# Patient Record
Sex: MALE | Race: BLACK OR AFRICAN AMERICAN | NOT HISPANIC OR LATINO | Employment: OTHER | URBAN - METROPOLITAN AREA
[De-identification: names, ages, dates, MRNs, and addresses within clinical notes are randomized per-mention and may not be internally consistent; named-entity substitution may affect disease eponyms.]

---

## 2022-01-24 ENCOUNTER — NURSE TRIAGE (OUTPATIENT)
Dept: ADMINISTRATIVE | Facility: CLINIC | Age: 82
End: 2022-01-24

## 2022-01-24 NOTE — TELEPHONE ENCOUNTER
Patients representative States patient is not with him but he is calling to ask if he should bring patient to Hospital because of coughing up green stuff and trouble with legs. Advised to bring patient in, No PCP on file.   Reason for Disposition   Information only question and nurse able to answer    Protocols used: INFORMATION ONLY CALL - NO TRIAGE-A-OH

## 2022-02-17 ENCOUNTER — OFFICE VISIT (OUTPATIENT)
Dept: FAMILY MEDICINE | Facility: CLINIC | Age: 82
End: 2022-02-17
Payer: MEDICARE

## 2022-02-17 ENCOUNTER — LAB VISIT (OUTPATIENT)
Dept: LAB | Facility: HOSPITAL | Age: 82
End: 2022-02-17
Attending: FAMILY MEDICINE
Payer: MEDICARE

## 2022-02-17 ENCOUNTER — TELEPHONE (OUTPATIENT)
Dept: FAMILY MEDICINE | Facility: CLINIC | Age: 82
End: 2022-02-17
Payer: MEDICARE

## 2022-02-17 VITALS
HEART RATE: 92 BPM | BODY MASS INDEX: 20.04 KG/M2 | HEIGHT: 70 IN | OXYGEN SATURATION: 85 % | WEIGHT: 140 LBS | TEMPERATURE: 98 F | SYSTOLIC BLOOD PRESSURE: 134 MMHG | DIASTOLIC BLOOD PRESSURE: 72 MMHG

## 2022-02-17 DIAGNOSIS — N18.31 STAGE 3A CHRONIC KIDNEY DISEASE: ICD-10-CM

## 2022-02-17 DIAGNOSIS — M17.0 PRIMARY OSTEOARTHRITIS OF BOTH KNEES: ICD-10-CM

## 2022-02-17 DIAGNOSIS — G89.4 CHRONIC PAIN SYNDROME: ICD-10-CM

## 2022-02-17 DIAGNOSIS — Z76.89 ENCOUNTER TO ESTABLISH CARE: ICD-10-CM

## 2022-02-17 DIAGNOSIS — Z76.89 ENCOUNTER TO ESTABLISH CARE: Primary | ICD-10-CM

## 2022-02-17 DIAGNOSIS — I10 HYPERTENSION, UNSPECIFIED TYPE: ICD-10-CM

## 2022-02-17 DIAGNOSIS — R05.9 COUGH: ICD-10-CM

## 2022-02-17 PROBLEM — I25.10 CAD (CORONARY ARTERY DISEASE): Status: ACTIVE | Noted: 2022-02-17

## 2022-02-17 PROBLEM — I25.10 CAD (CORONARY ARTERY DISEASE): Status: RESOLVED | Noted: 2022-02-17 | Resolved: 2022-02-17

## 2022-02-17 LAB
ALBUMIN SERPL BCP-MCNC: 3.4 G/DL (ref 3.5–5.2)
ALP SERPL-CCNC: 54 U/L (ref 55–135)
ALT SERPL W/O P-5'-P-CCNC: 21 U/L (ref 10–44)
ANION GAP SERPL CALC-SCNC: 8 MMOL/L (ref 8–16)
AST SERPL-CCNC: 29 U/L (ref 10–40)
BASOPHILS # BLD AUTO: 0.01 K/UL (ref 0–0.2)
BASOPHILS NFR BLD: 0.2 % (ref 0–1.9)
BILIRUB SERPL-MCNC: 0.3 MG/DL (ref 0.1–1)
BUN SERPL-MCNC: 49 MG/DL (ref 8–23)
CALCIUM SERPL-MCNC: 8.8 MG/DL (ref 8.7–10.5)
CHLORIDE SERPL-SCNC: 108 MMOL/L (ref 95–110)
CO2 SERPL-SCNC: 21 MMOL/L (ref 23–29)
CREAT SERPL-MCNC: 1.8 MG/DL (ref 0.5–1.4)
DIFFERENTIAL METHOD: ABNORMAL
EOSINOPHIL # BLD AUTO: 0 K/UL (ref 0–0.5)
EOSINOPHIL NFR BLD: 0.2 % (ref 0–8)
ERYTHROCYTE [DISTWIDTH] IN BLOOD BY AUTOMATED COUNT: 13.9 % (ref 11.5–14.5)
EST. GFR  (AFRICAN AMERICAN): 40 ML/MIN/1.73 M^2
EST. GFR  (NON AFRICAN AMERICAN): 35 ML/MIN/1.73 M^2
GLUCOSE SERPL-MCNC: 72 MG/DL (ref 70–110)
HCT VFR BLD AUTO: 32.1 % (ref 40–54)
HGB BLD-MCNC: 10.5 G/DL (ref 14–18)
IMM GRANULOCYTES # BLD AUTO: 0.01 K/UL (ref 0–0.04)
IMM GRANULOCYTES NFR BLD AUTO: 0.2 % (ref 0–0.5)
LYMPHOCYTES # BLD AUTO: 1.5 K/UL (ref 1–4.8)
LYMPHOCYTES NFR BLD: 33.8 % (ref 18–48)
MCH RBC QN AUTO: 29.8 PG (ref 27–31)
MCHC RBC AUTO-ENTMCNC: 32.7 G/DL (ref 32–36)
MCV RBC AUTO: 91 FL (ref 82–98)
MONOCYTES # BLD AUTO: 0.7 K/UL (ref 0.3–1)
MONOCYTES NFR BLD: 16.8 % (ref 4–15)
NEUTROPHILS # BLD AUTO: 2.1 K/UL (ref 1.8–7.7)
NEUTROPHILS NFR BLD: 48.8 % (ref 38–73)
NRBC BLD-RTO: 0 /100 WBC
PLATELET # BLD AUTO: 162 K/UL (ref 150–450)
PMV BLD AUTO: 11.8 FL (ref 9.2–12.9)
POTASSIUM SERPL-SCNC: 4.9 MMOL/L (ref 3.5–5.1)
PROT SERPL-MCNC: 7.2 G/DL (ref 6–8.4)
RBC # BLD AUTO: 3.52 M/UL (ref 4.6–6.2)
SODIUM SERPL-SCNC: 137 MMOL/L (ref 136–145)
WBC # BLD AUTO: 4.35 K/UL (ref 3.9–12.7)

## 2022-02-17 PROCEDURE — 99999 PR PBB SHADOW E&M-EST. PATIENT-LVL IV: ICD-10-PCS | Mod: PBBFAC,,, | Performed by: FAMILY MEDICINE

## 2022-02-17 PROCEDURE — 36415 COLL VENOUS BLD VENIPUNCTURE: CPT | Performed by: FAMILY MEDICINE

## 2022-02-17 PROCEDURE — 99999 PR PBB SHADOW E&M-EST. PATIENT-LVL IV: CPT | Mod: PBBFAC,,, | Performed by: FAMILY MEDICINE

## 2022-02-17 PROCEDURE — 85025 COMPLETE CBC W/AUTO DIFF WBC: CPT | Performed by: FAMILY MEDICINE

## 2022-02-17 PROCEDURE — 99204 PR OFFICE/OUTPT VISIT, NEW, LEVL IV, 45-59 MIN: ICD-10-PCS | Mod: S$PBB,,, | Performed by: FAMILY MEDICINE

## 2022-02-17 PROCEDURE — 99214 OFFICE O/P EST MOD 30 MIN: CPT | Mod: PBBFAC,PO | Performed by: FAMILY MEDICINE

## 2022-02-17 PROCEDURE — 80053 COMPREHEN METABOLIC PANEL: CPT | Performed by: FAMILY MEDICINE

## 2022-02-17 PROCEDURE — 99204 OFFICE O/P NEW MOD 45 MIN: CPT | Mod: S$PBB,,, | Performed by: FAMILY MEDICINE

## 2022-02-17 RX ORDER — POTASSIUM CHLORIDE 750 MG/1
10 TABLET, EXTENDED RELEASE ORAL
COMMUNITY
Start: 2021-10-28 | End: 2022-10-28

## 2022-02-17 RX ORDER — CARVEDILOL 25 MG/1
25 TABLET ORAL 2 TIMES DAILY
COMMUNITY
Start: 2022-01-22

## 2022-02-17 RX ORDER — FERROUS GLUCONATE 324(38)MG
1 TABLET ORAL EVERY MORNING
COMMUNITY
Start: 2021-11-24

## 2022-02-17 RX ORDER — LANOLIN ALCOHOL/MO/W.PET/CERES
1 CREAM (GRAM) TOPICAL DAILY
COMMUNITY
Start: 2021-12-23

## 2022-02-17 RX ORDER — RANOLAZINE 1000 MG/1
1000 TABLET, EXTENDED RELEASE ORAL 2 TIMES DAILY
COMMUNITY
Start: 2022-01-22

## 2022-02-17 RX ORDER — MINERAL OIL
180 ENEMA (ML) RECTAL DAILY
Qty: 30 TABLET | Refills: 0 | Status: SHIPPED | OUTPATIENT
Start: 2022-02-17 | End: 2023-02-17

## 2022-02-17 NOTE — PROGRESS NOTES
(Portions of this note were dictated using voice recognition software and may contain dictation related errors in spelling/grammar/syntax not found on text review)    CC:   Chief Complaint   Patient presents with    Follow-up       HPI: 81 y.o. male presented as a new pt to establish care accompanied with son. He has medical history of osteoarthritis of both knees, has chronic pain ronald to it, he ambulates with walker but does not walk much due to weakness. He recently moved from [place] to live with her daughter, son check on him as well. He brought medications bottles with him, currently he is taking coreg 25 bid for HTN, also takes renexa, son and pt are not sure of cardiac history, states he used to see a cardiologist many years ago. He used to follow up with nephrology also, wants referral for nephrology, pain management. He reports cough which is dry for the past week, denies fever, chills,cSOB, chest pain, N/V,abdominal pain, changes in bowel habits, urine problems. Other medications he is taking are ferrous sulphate, magnesium oxide and potassium chloride, does not need refills. He has no other concerns.     No past medical history on file.    No past surgical history on file.    No family history on file.         Lab Results   Component Value Date    CHOL 98 07/07/2021    TRIG 37 07/07/2021    HDL 48 07/07/2021    HGBA1C 5.5 11/12/2020    LDLCALC 43 07/07/2021           Vital signs reviewed  PE:   APPEARANCE: Well nourished, well developed, in no acute distress.    HEAD: Normocephalic, atraumatic.  EYES: EOMI.  Conjunctivae noninjected.  NOSE: Mucosa pink. Airway clear.  MOUTH & THROAT: No tonsillar enlargement. No pharyngeal erythema or exudate.   NECK: Supple with no cervical lymphadenopathy.    CHEST: Good inspiratory effort. Lungs clear to auscultation with no wheezes or crackles.  CARDIOVASCULAR: Normal S1, S2. No rubs, murmurs, or gallops.  ABDOMEN: Bowel sounds normal. Not distended. Soft. No  tenderness or masses. No organomegaly.  EXTREMITIES: No edema, cyanosis, or clubbing.    Review of Systems   Constitutional: Negative for chills, fatigue and fever.   HENT: Negative.    Respiratory: Negative for cough, shortness of breath and wheezing.    Cardiovascular: Negative for chest pain, palpitations and leg swelling.   Gastrointestinal: Negative for change in bowel habit, constipation, diarrhea, nausea, vomiting and change in bowel habit.   Genitourinary: Negative.    Musculoskeletal: Negative.    Neurological: Negative.    Psychiatric/Behavioral: Negative.    All other systems reviewed and are negative.      IMPRESSION  1. Encounter to establish care    2. Primary osteoarthritis of both knees    3. Cough    4. Stage 3a chronic kidney disease    5. Chronic pain syndrome          PLAN      1. Primary osteoarthritis of both knees  - Ambulatory referral/consult to Pain Clinic; Future  Tylenol arthritis as needed  Avoid NSAID'S due to low kidney function      2. Cough  Advised to use mucinex      3. Stage 3a chronic kidney disease  - Ambulatory referral/consult to Nephrology; Future  - CBC Auto Differential; Future  - Comprehensive Metabolic Panel; Future      4. Chronic pain syndrome  - Ambulatory referral/consult to Pain Clinic; Future      5. Encounter to establish care  - CBC Auto Differential; Future  - Comprehensive Metabolic Panel; Future       6. Hypertension  Stable  Continue coreg 25 mg  Low salt diet      SCREENINGS      Immunizations:   denies flu and shingles   Had Covid and tdap      Age/demographic appropriate health maintenance:  Done      Phuong Umaña   2/17/2022

## 2022-02-22 DIAGNOSIS — N18.31 STAGE 3A CHRONIC KIDNEY DISEASE: Primary | ICD-10-CM

## 2022-02-28 ENCOUNTER — TELEPHONE (OUTPATIENT)
Dept: FAMILY MEDICINE | Facility: CLINIC | Age: 82
End: 2022-02-28
Payer: MEDICARE

## 2022-03-04 ENCOUNTER — TELEPHONE (OUTPATIENT)
Dept: FAMILY MEDICINE | Facility: CLINIC | Age: 82
End: 2022-03-04
Payer: MEDICARE

## 2022-03-28 ENCOUNTER — LAB VISIT (OUTPATIENT)
Dept: LAB | Facility: HOSPITAL | Age: 82
End: 2022-03-28
Attending: FAMILY MEDICINE
Payer: MEDICARE

## 2022-03-28 DIAGNOSIS — N18.31 STAGE 3A CHRONIC KIDNEY DISEASE: ICD-10-CM

## 2022-03-28 LAB
ANION GAP SERPL CALC-SCNC: 10 MMOL/L (ref 8–16)
BASOPHILS # BLD AUTO: 0.02 K/UL (ref 0–0.2)
BASOPHILS NFR BLD: 0.4 % (ref 0–1.9)
CALCIUM SERPL-MCNC: 8.9 MG/DL (ref 8.7–10.5)
CHLORIDE SERPL-SCNC: 105 MMOL/L (ref 95–110)
CO2 SERPL-SCNC: 24 MMOL/L (ref 23–29)
CREAT SERPL-MCNC: 1.49 MG/DL (ref 0.5–1.4)
DIFFERENTIAL METHOD: ABNORMAL
EOSINOPHIL # BLD AUTO: 0.2 K/UL (ref 0–0.5)
EOSINOPHIL NFR BLD: 4.4 % (ref 0–8)
ERYTHROCYTE [DISTWIDTH] IN BLOOD BY AUTOMATED COUNT: 15 % (ref 11.5–14.5)
EST. GFR  (AFRICAN AMERICAN): 50.9 ML/MIN/1.73 M^2
EST. GFR  (NON AFRICAN AMERICAN): 44 ML/MIN/1.73 M^2
GLUCOSE SERPL-MCNC: 104 MG/DL (ref 70–110)
HCT VFR BLD AUTO: 30.7 % (ref 40–54)
HGB BLD-MCNC: 9.8 G/DL (ref 14–18)
IMM GRANULOCYTES # BLD AUTO: 0.01 K/UL (ref 0–0.04)
IMM GRANULOCYTES NFR BLD AUTO: 0.2 % (ref 0–0.5)
LYMPHOCYTES # BLD AUTO: 1.7 K/UL (ref 1–4.8)
LYMPHOCYTES NFR BLD: 31 % (ref 18–48)
MCH RBC QN AUTO: 28.9 PG (ref 27–31)
MCHC RBC AUTO-ENTMCNC: 31.9 G/DL (ref 32–36)
MCV RBC AUTO: 91 FL (ref 82–98)
MONOCYTES # BLD AUTO: 0.6 K/UL (ref 0.3–1)
MONOCYTES NFR BLD: 10.7 % (ref 4–15)
NEUTROPHILS # BLD AUTO: 2.9 K/UL (ref 1.8–7.7)
NEUTROPHILS NFR BLD: 53.3 % (ref 38–73)
NRBC BLD-RTO: 0 /100 WBC
PLATELET # BLD AUTO: 214 K/UL (ref 150–450)
PMV BLD AUTO: 12.2 FL (ref 9.2–12.9)
POTASSIUM SERPL-SCNC: 4.7 MMOL/L (ref 3.5–5.1)
RBC # BLD AUTO: 3.39 M/UL (ref 4.6–6.2)
SODIUM SERPL-SCNC: 139 MMOL/L (ref 136–145)
UUN UR-MCNC: 16 MG/DL (ref 2–20)
WBC # BLD AUTO: 5.51 K/UL (ref 3.9–12.7)

## 2022-03-28 PROCEDURE — 85025 COMPLETE CBC W/AUTO DIFF WBC: CPT | Mod: PO | Performed by: INTERNAL MEDICINE

## 2022-03-28 PROCEDURE — 36415 COLL VENOUS BLD VENIPUNCTURE: CPT | Mod: PO | Performed by: INTERNAL MEDICINE

## 2022-03-28 PROCEDURE — 80048 BASIC METABOLIC PNL TOTAL CA: CPT | Mod: PO | Performed by: INTERNAL MEDICINE

## 2022-08-04 ENCOUNTER — LAB VISIT (OUTPATIENT)
Dept: LAB | Facility: HOSPITAL | Age: 82
End: 2022-08-04
Attending: INTERNAL MEDICINE
Payer: MEDICARE

## 2022-08-04 DIAGNOSIS — I25.10 ASHD (ARTERIOSCLEROTIC HEART DISEASE): Primary | ICD-10-CM

## 2022-08-04 LAB
CHOLEST SERPL-MCNC: 201 MG/DL (ref 120–199)
CHOLEST/HDLC SERPL: 4.9 {RATIO} (ref 2–5)
HDLC SERPL-MCNC: 41 MG/DL (ref 40–75)
HDLC SERPL: 20.4 % (ref 20–50)
LDLC SERPL CALC-MCNC: 139.2 MG/DL (ref 63–159)
NONHDLC SERPL-MCNC: 160 MG/DL
TRIGL SERPL-MCNC: 104 MG/DL (ref 30–150)

## 2022-08-04 PROCEDURE — 36415 COLL VENOUS BLD VENIPUNCTURE: CPT | Mod: PO | Performed by: INTERNAL MEDICINE

## 2022-08-04 PROCEDURE — 80061 LIPID PANEL: CPT | Performed by: INTERNAL MEDICINE

## 2022-08-10 ENCOUNTER — HOSPITAL ENCOUNTER (EMERGENCY)
Facility: HOSPITAL | Age: 82
Discharge: HOME | End: 2022-08-10
Attending: EMERGENCY MEDICINE
Payer: MEDICARE

## 2022-08-10 VITALS
TEMPERATURE: 99 F | HEIGHT: 70 IN | RESPIRATION RATE: 18 BRPM | BODY MASS INDEX: 18.61 KG/M2 | HEART RATE: 69 BPM | OXYGEN SATURATION: 100 % | SYSTOLIC BLOOD PRESSURE: 137 MMHG | DIASTOLIC BLOOD PRESSURE: 63 MMHG | WEIGHT: 130 LBS

## 2022-08-10 DIAGNOSIS — M54.12 CERVICAL RADICULOPATHY: ICD-10-CM

## 2022-08-10 PROCEDURE — 63600175 PHARM REV CODE 636 W HCPCS: Mod: ER | Performed by: EMERGENCY MEDICINE

## 2022-08-10 PROCEDURE — 99284 EMERGENCY DEPT VISIT MOD MDM: CPT | Mod: ER

## 2022-08-10 PROCEDURE — 25000003 PHARM REV CODE 250: Mod: ER | Performed by: EMERGENCY MEDICINE

## 2022-08-10 RX ORDER — PREDNISONE 20 MG/1
40 TABLET ORAL
Status: COMPLETED | OUTPATIENT
Start: 2022-08-10 | End: 2022-08-10

## 2022-08-10 RX ORDER — TRAMADOL HYDROCHLORIDE 50 MG/1
50 TABLET ORAL
Status: COMPLETED | OUTPATIENT
Start: 2022-08-10 | End: 2022-08-10

## 2022-08-10 RX ORDER — TRAMADOL HYDROCHLORIDE 50 MG/1
50 TABLET ORAL EVERY 8 HOURS PRN
Qty: 9 TABLET | Refills: 0 | Status: SHIPPED | OUTPATIENT
Start: 2022-08-10 | End: 2022-08-10 | Stop reason: SDUPTHER

## 2022-08-10 RX ORDER — PREDNISONE 20 MG/1
40 TABLET ORAL DAILY
Qty: 10 TABLET | Refills: 0 | Status: SHIPPED | OUTPATIENT
Start: 2022-08-10 | End: 2022-08-15

## 2022-08-10 RX ORDER — TRAMADOL HYDROCHLORIDE 50 MG/1
50 TABLET ORAL EVERY 8 HOURS PRN
Qty: 9 TABLET | Refills: 0 | Status: SHIPPED | OUTPATIENT
Start: 2022-08-10 | End: 2022-08-13

## 2022-08-10 RX ORDER — PREDNISONE 20 MG/1
40 TABLET ORAL ONCE
Status: DISCONTINUED | OUTPATIENT
Start: 2022-08-10 | End: 2022-08-10

## 2022-08-10 RX ADMIN — PREDNISONE 40 MG: 20 TABLET ORAL at 07:08

## 2022-08-10 RX ADMIN — TRAMADOL HYDROCHLORIDE 50 MG: 50 TABLET, COATED ORAL at 07:08

## 2022-08-11 NOTE — ED PROVIDER NOTES
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      Patient currently presents with concern regarding neck pain.  Onset noted over the past several weeks with recent worsening in the last few days.  Patient describes radiation of the pain to the bilateral upper extremities.  There is no numbness, weakness, incontinence reported.  Patient has not attempted treatment at home with over-the-counter medications.  Urinary complaints are denied.          Review of patient's allergies indicates:   Allergen Reactions    Nsaids (non-steroidal anti-inflammatory drug)      Past Medical History:   Diagnosis Date    Arthritis     Hypertension     Renal disorder     Stroke      No past surgical history on file.  No family history on file.  Social History     Tobacco Use    Smoking status: Former Smoker   Substance Use Topics    Alcohol use: Never     Review of Systems   Constitutional: Negative for chills and fever.   HENT: Negative for congestion and sore throat.    Respiratory: Negative for chest tightness and shortness of breath.    Cardiovascular: Negative for chest pain and palpitations.   Gastrointestinal: Negative for abdominal pain and vomiting.   Genitourinary: Negative for difficulty urinating and dysuria.   Musculoskeletal: Positive for neck pain.   Skin: Negative for color change and rash.   Allergic/Immunologic: Negative for immunocompromised state.   Neurological: Negative for weakness and numbness.   Hematological: Negative for adenopathy.   All other systems reviewed and are negative.    Physical Exam     Initial Vitals [08/10/22 1810]   BP Pulse Resp Temp SpO2   137/63 69 19 98.9 °F (37.2 °C) 100 %      MAP       --         Physical Exam    Constitutional: He appears well-developed and well-nourished. He is not diaphoretic. No distress.   HENT:    Head: Normocephalic and atraumatic.   Neck: Neck supple. No crepitus. No JVD present.   Cardiovascular: Normal rate, regular rhythm, normal heart sounds and intact distal pulses.   Pulmonary/Chest: Breath sounds normal. No respiratory distress.   Musculoskeletal:      Cervical back: Neck supple. Tenderness present. No deformity, bony tenderness or crepitus. Normal range of motion.        Back:      Neurological: He is alert and oriented to person, place, and time. He has normal strength. No sensory deficit.   Skin: Skin is warm and dry.       ED Course   Procedures  Labs Reviewed - No data to display       Imaging Results          X-Ray Cervical Spine AP And Lateral (Final result)  Result time 08/10/22 18:56:41    Final result by Fabio Ramirez MD (08/10/22 18:56:41)                 Impression:      No definite fracture noting that advanced degenerative changes are present throughout the cervical spine and somewhat degrade evaluation for acute abnormality.  Clinical correlation and further evaluation as warranted.      Electronically signed by: Fabio Ramirez  Date:    08/10/2022  Time:    18:56             Narrative:    EXAMINATION:  XR CERVICAL SPINE AP LATERAL    CLINICAL HISTORY:  Radiculopathy, cervical region    TECHNIQUE:  AP, lateral and open mouth views of the cervical spine were performed.    COMPARISON:  None.    FINDINGS:  No definite fracture.  No traumatic malalignment.  No osseous destructive process.    Advanced degenerative change throughout the cervical spine with near complete disc space height loss and facet and uncovertebral joint arthropathy.  No prevertebral soft tissue swelling.  Clinical correlation is advised.                                 Medications   traMADoL tablet 50 mg (50 mg Oral Given 8/10/22 1930)   predniSONE tablet 40 mg (40 mg Oral Given 8/10/22 1931)     Medical Decision Making:   ED Management:  All findings were reviewed with the patient/family in detail.   Symptoms  appear to be related to cervical radiculopathy as result of degenerative disease as appreciated in the imaging studies.  Patient has no neuro deficits at present.  I see no indication of an emergent process beyond that addressed during our encounter but have duly counseled the patient/family regarding the need for prompt follow-up as well as the indications that should prompt immediate return to the emergency room should new or worrisome developments occur.  The patient has additionally been provided with printed information regarding diagnosis as well as instructions regarding follow up and any medications intended to manage the patient's aforementioned conditions.  The patient/family communicates understanding of all this information and all remaining questions and concerns were addressed at this time.                            Clinical Impression:   Final diagnoses:  [M54.12] Cervical radiculopathy          ED Disposition Condition    Discharge Stable        ED Prescriptions     Medication Sig Dispense Start Date End Date Auth. Provider    traMADoL (ULTRAM) 50 mg tablet  (Status: Discontinued) Take 1 tablet (50 mg total) by mouth every 8 (eight) hours as needed for Pain. 9 tablet 8/10/2022 8/10/2022 Donato Majano MD    predniSONE (DELTASONE) 20 MG tablet Take 2 tablets (40 mg total) by mouth once daily. for 5 days 10 tablet 8/10/2022 8/15/2022 Donato Majano MD    traMADoL (ULTRAM) 50 mg tablet Take 1 tablet (50 mg total) by mouth every 8 (eight) hours as needed for Pain. 9 tablet 8/10/2022 8/13/2022 Donato Majano MD        Follow-up Information     Follow up With Specialties Details Why Contact Info    PCP  Schedule an appointment as soon as possible for a visit  for reassessment     [facility] Emergency Dept Emergency Medicine Go to  As needed, If symptoms worsen [address] [facility] [address] [phone]           Donato Majano MD  08/11/22 6070

## 2022-08-11 NOTE — ED NOTES
Reviewed discharge instructions, Rx, and follow up with pt and family.  Pt and family verbalized understanding.  Pt discharged in stable condition

## 2022-08-11 NOTE — ED NOTES
"Pt states has had old CVA, states some tingling to Left side "sometimes" since CVA.  Pt denies any falls.  Pt with pain to bilateral shoulders, pain with palpation to both shoulders.  Family member states gave toradol yesterday for pain.  States pt has c/o pain since yesterday.   "

## 2022-09-21 ENCOUNTER — HOSPITAL ENCOUNTER (EMERGENCY)
Facility: HOSPITAL | Age: 82
Discharge: HOME | End: 2022-09-21
Attending: EMERGENCY MEDICINE
Payer: MEDICARE

## 2022-09-21 VITALS
DIASTOLIC BLOOD PRESSURE: 59 MMHG | TEMPERATURE: 98 F | HEIGHT: 70 IN | HEART RATE: 60 BPM | RESPIRATION RATE: 20 BRPM | SYSTOLIC BLOOD PRESSURE: 109 MMHG | OXYGEN SATURATION: 100 % | WEIGHT: 130 LBS | BODY MASS INDEX: 18.61 KG/M2

## 2022-09-21 DIAGNOSIS — Z87.898 HISTORY OF EPISTAXIS: Primary | ICD-10-CM

## 2022-09-21 DIAGNOSIS — R41.3 MEMORY LOSS: ICD-10-CM

## 2022-09-21 LAB
ALBUMIN SERPL BCP-MCNC: 3.6 G/DL (ref 3.5–5.2)
ALP SERPL-CCNC: 72 U/L (ref 38–126)
ALT SERPL W/O P-5'-P-CCNC: 19 U/L (ref 10–44)
ANION GAP SERPL CALC-SCNC: 8 MMOL/L (ref 8–16)
AST SERPL-CCNC: 22 U/L (ref 15–46)
BASOPHILS # BLD AUTO: 0.03 K/UL (ref 0–0.2)
BASOPHILS NFR BLD: 0.6 % (ref 0–1.9)
BILIRUB SERPL-MCNC: 0.3 MG/DL (ref 0.1–1)
BILIRUB UR QL STRIP: NEGATIVE
CALCIUM SERPL-MCNC: 8.6 MG/DL (ref 8.7–10.5)
CHLORIDE SERPL-SCNC: 107 MMOL/L (ref 95–110)
CLARITY UR REFRACT.AUTO: CLEAR
CO2 SERPL-SCNC: 24 MMOL/L (ref 23–29)
COLOR UR AUTO: YELLOW
CREAT SERPL-MCNC: 1.31 MG/DL (ref 0.5–1.4)
DIFFERENTIAL METHOD: ABNORMAL
EOSINOPHIL # BLD AUTO: 0.3 K/UL (ref 0–0.5)
EOSINOPHIL NFR BLD: 5.3 % (ref 0–8)
ERYTHROCYTE [DISTWIDTH] IN BLOOD BY AUTOMATED COUNT: 13.3 % (ref 11.5–14.5)
EST. GFR  (NO RACE VARIABLE): 55 ML/MIN/1.73 M^2
GLUCOSE SERPL-MCNC: 119 MG/DL (ref 70–110)
GLUCOSE UR QL STRIP: NEGATIVE
HCT VFR BLD AUTO: 25.6 % (ref 40–54)
HGB BLD-MCNC: 8.4 G/DL (ref 14–18)
HGB UR QL STRIP: NEGATIVE
IMM GRANULOCYTES # BLD AUTO: 0.01 K/UL (ref 0–0.04)
IMM GRANULOCYTES NFR BLD AUTO: 0.2 % (ref 0–0.5)
KETONES UR QL STRIP: NEGATIVE
LEUKOCYTE ESTERASE UR QL STRIP: NEGATIVE
LYMPHOCYTES # BLD AUTO: 1.2 K/UL (ref 1–4.8)
LYMPHOCYTES NFR BLD: 23.3 % (ref 18–48)
MCH RBC QN AUTO: 29.2 PG (ref 27–31)
MCHC RBC AUTO-ENTMCNC: 32.8 G/DL (ref 32–36)
MCV RBC AUTO: 89 FL (ref 82–98)
MONOCYTES # BLD AUTO: 0.4 K/UL (ref 0.3–1)
MONOCYTES NFR BLD: 8.4 % (ref 4–15)
NEUTROPHILS # BLD AUTO: 3.2 K/UL (ref 1.8–7.7)
NEUTROPHILS NFR BLD: 62.2 % (ref 38–73)
NITRITE UR QL STRIP: NEGATIVE
NRBC BLD-RTO: 0 /100 WBC
PH UR STRIP: 6 [PH] (ref 5–8)
PLATELET # BLD AUTO: 164 K/UL (ref 150–450)
PMV BLD AUTO: 12 FL (ref 9.2–12.9)
POTASSIUM SERPL-SCNC: 5.2 MMOL/L (ref 3.5–5.1)
PROT SERPL-MCNC: 6.7 G/DL (ref 6–8.4)
PROT UR QL STRIP: NEGATIVE
RBC # BLD AUTO: 2.88 M/UL (ref 4.6–6.2)
SODIUM SERPL-SCNC: 139 MMOL/L (ref 136–145)
SP GR UR STRIP: <=1.005 (ref 1–1.03)
URN SPEC COLLECT METH UR: NORMAL
UROBILINOGEN UR STRIP-ACNC: NEGATIVE EU/DL
UUN UR-MCNC: 22 MG/DL (ref 2–20)
WBC # BLD AUTO: 5.1 K/UL (ref 3.9–12.7)

## 2022-09-21 PROCEDURE — 99284 EMERGENCY DEPT VISIT MOD MDM: CPT | Mod: 25,ER

## 2022-09-21 PROCEDURE — 81003 URINALYSIS AUTO W/O SCOPE: CPT | Mod: ER | Performed by: PHYSICIAN ASSISTANT

## 2022-09-21 PROCEDURE — 80053 COMPREHEN METABOLIC PANEL: CPT | Mod: ER | Performed by: PHYSICIAN ASSISTANT

## 2022-09-21 PROCEDURE — 25000003 PHARM REV CODE 250: Mod: ER | Performed by: PHYSICIAN ASSISTANT

## 2022-09-21 PROCEDURE — 96360 HYDRATION IV INFUSION INIT: CPT | Mod: ER

## 2022-09-21 PROCEDURE — 85025 COMPLETE CBC W/AUTO DIFF WBC: CPT | Mod: ER | Performed by: PHYSICIAN ASSISTANT

## 2022-09-21 RX ADMIN — SODIUM CHLORIDE 1000 ML: 0.9 INJECTION, SOLUTION INTRAVENOUS at 03:09

## 2022-09-21 NOTE — DISCHARGE INSTRUCTIONS
Follow-up with primary care physician for further management of the memory loss.  In the ER if symptoms persist or worsen

## 2022-09-21 NOTE — ED PROVIDER NOTES
Encounter Date: 9/21/2022       History     Chief Complaint   Patient presents with    Epistaxis     Pt c/o nose bleeding several times this week. Pt states being worried that someone placed something in his food. Daughter states pt is in early sides of Dementia.      82-year-old male with history of CVA, hypertension, arthritis, CKD presents the ER for evaluation of epistaxis.  Patient is here with daughter who states that she noticed that patient has had intermittent nasal bleeding.  He had 1 episode today, 1 last week and 1 the week before.  States that he has been excessively blowing his nose lately.  No URI symptoms, cough or congestion otherwise.  Does not use any blood thinners.    Daughter states that patient has had intermittent memory loss and confusion for several months.  Daughter thinks that patient may have early onset undiagnosed dementia.  Does not currently have a primary care physician as he recently moved from [place] into her home.  He has not had any head trauma or loss of consciousness.  States that patient has been acting to his baseline for the last several months.  Has not had any fevers or chills at home otherwise.  No vomiting or diarrhea.  Walks with a walker at baseline.    The history is provided by the patient.   Review of patient's allergies indicates:   Allergen Reactions    Nsaids (non-steroidal anti-inflammatory drug)      Past Medical History:   Diagnosis Date    Arthritis     Hypertension     Renal disorder     Stroke      No past surgical history on file.  No family history on file.  Social History     Tobacco Use    Smoking status: Former   Substance Use Topics    Alcohol use: Never     Review of Systems   Constitutional:  Negative for chills and fever.   HENT:  Positive for nosebleeds (not active). Negative for congestion.    Eyes:  Negative for visual disturbance.   Respiratory:  Negative for cough and shortness of breath.    Cardiovascular:  Negative for chest pain.    Gastrointestinal:  Negative for abdominal pain, nausea and vomiting.   Genitourinary:  Negative for dysuria and flank pain.   Musculoskeletal:  Negative for myalgias.   Skin:  Negative for rash.   Allergic/Immunologic: Negative for immunocompromised state.   Neurological:  Negative for weakness and numbness.   Hematological:  Does not bruise/bleed easily.   Psychiatric/Behavioral:  Positive for confusion (chronic).      Physical Exam     Initial Vitals [09/21/22 1434]   BP Pulse Resp Temp SpO2   136/62 71 18 98.1 °F (36.7 °C) 98 %      MAP       --         Physical Exam    Vitals reviewed.  Constitutional: He appears well-developed and well-nourished. He is not diaphoretic. No distress.   HENT:   Head: Normocephalic and atraumatic.   Nose: Nose normal. No nasal deformity or nasal septal hematoma. No epistaxis.   Mouth/Throat: Uvula is midline, oropharynx is clear and moist and mucous membranes are normal.   Eyes: Conjunctivae and EOM are normal.   Neck: Neck supple.   Cardiovascular:  Normal rate, regular rhythm, normal heart sounds and intact distal pulses.           Pulmonary/Chest: Breath sounds normal. No respiratory distress.   Musculoskeletal:         General: Normal range of motion.      Cervical back: Neck supple.     Neurological: He is alert. He has normal strength. He is disoriented (place, situation). GCS eye subscore is 4. GCS verbal subscore is 5. GCS motor subscore is 6.   Walks with gait   Skin: Skin is warm.       ED Course   Procedures  Labs Reviewed   CBC W/ AUTO DIFFERENTIAL - Abnormal; Notable for the following components:       Result Value    RBC 2.88 (*)     Hemoglobin 8.4 (*)     Hematocrit 25.6 (*)     All other components within normal limits   COMPREHENSIVE METABOLIC PANEL - Abnormal; Notable for the following components:    Potassium 5.2 (*)     Glucose 119 (*)     BUN 22 (*)     Calcium 8.6 (*)     eGFR 55.0 (*)     All other components within normal limits   URINALYSIS, REFLEX TO  URINE CULTURE    Narrative:     Preferred Collection Type->Urine, Clean Catch  Specimen Source->Urine  Collection Type->Urine, Clean Catch          Imaging Results    None          Medications   sodium chloride 0.9% bolus 1,000 mL (0 mLs Intravenous Stopped 9/21/22 4284)           APC / Resident Notes:   Patient seen in the ER promptly upon arrival.  He is afebrile, no acute distress.  Physical examination unremarkable.  Nasal exam is unremarkable.  No active epistaxis.  Oropharynx is patent.  No erythema or exudates.  Heart and lung sounds normal.  Abdomen soft, nondistended, nontender.  Patient is alert and oriented x2.  He is otherwise pleasant and no acute distress.    Laboratory studies normal white count of 5.1.  Hemoglobin is stable.  Chemistries unremarkable.  Similar to history of CKD.  Urinalysis does not show evidence of infection or blood.    Patient has memory loss and intermittent confusion ongoing for several months may be secondary to possible early dementia which will need to be worked up by primary care physician.  Daughter requested for a new primary doctor as well as Podiatry which both given to her.    No episodes of nosebleeds while in the emergency room.  Given nosebleed discharge instructions.  Patient is otherwise stable for discharge and close follow-up at this time.    Disclaimer: This note has been generated using voice-recognition software. There may be typographical errors that have been missed during proof-reading.                       Clinical Impression:   Final diagnoses:  [Z87.898] History of epistaxis (Primary)  [R41.3] Memory loss      ED Disposition Condition    Discharge Stable          ED Prescriptions    None       Follow-up Information       Follow up With Specialties Details Why Contact Info Additional Information    [facility] Internal Medicine Internal Medicine   [address] [place] [address] [phone] [facility]  Offices from [facility]. Take [facility] elevators to 2nd floor.    [place] [facility] Podiatry   [address], [address]  [facility] [address] [phone] [address]             Susan Hernandez PA-C  09/21/22 3684

## 2022-12-27 ENCOUNTER — LAB VISIT (OUTPATIENT)
Dept: LAB | Facility: HOSPITAL | Age: 82
End: 2022-12-27
Attending: INTERNAL MEDICINE
Payer: MEDICARE

## 2022-12-27 DIAGNOSIS — I25.10 CORONARY ARTERIOSCLEROSIS: Primary | ICD-10-CM

## 2022-12-27 LAB
ALBUMIN SERPL BCP-MCNC: 4.1 G/DL (ref 3.5–5.2)
ALP SERPL-CCNC: 77 U/L (ref 38–126)
ALT SERPL W/O P-5'-P-CCNC: 14 U/L (ref 10–44)
ANION GAP SERPL CALC-SCNC: 9 MMOL/L (ref 8–16)
AST SERPL-CCNC: 20 U/L (ref 15–46)
BILIRUB SERPL-MCNC: 0.5 MG/DL (ref 0.1–1)
CALCIUM SERPL-MCNC: 8.6 MG/DL (ref 8.7–10.5)
CHLORIDE SERPL-SCNC: 108 MMOL/L (ref 95–110)
CO2 SERPL-SCNC: 25 MMOL/L (ref 23–29)
CREAT SERPL-MCNC: 0.94 MG/DL (ref 0.5–1.4)
EST. GFR  (NO RACE VARIABLE): >60 ML/MIN/1.73 M^2
GLUCOSE SERPL-MCNC: 111 MG/DL (ref 70–110)
POTASSIUM SERPL-SCNC: 3.4 MMOL/L (ref 3.5–5.1)
PROT SERPL-MCNC: 7.1 G/DL (ref 6–8.4)
SODIUM SERPL-SCNC: 142 MMOL/L (ref 136–145)
UUN UR-MCNC: 13 MG/DL (ref 2–20)

## 2022-12-27 PROCEDURE — 36415 COLL VENOUS BLD VENIPUNCTURE: CPT | Mod: PO | Performed by: PHYSICIAN ASSISTANT

## 2022-12-27 PROCEDURE — 80053 COMPREHEN METABOLIC PANEL: CPT | Mod: 91,PO | Performed by: PHYSICIAN ASSISTANT

## 2023-06-13 ENCOUNTER — PATIENT OUTREACH (OUTPATIENT)
Dept: ADMINISTRATIVE | Facility: HOSPITAL | Age: 83
End: 2023-06-13
Payer: MEDICARE

## 2023-07-21 ENCOUNTER — LAB VISIT (OUTPATIENT)
Dept: LAB | Facility: HOSPITAL | Age: 83
End: 2023-07-21
Attending: INTERNAL MEDICINE
Payer: MEDICARE

## 2023-07-21 ENCOUNTER — PES CALL (OUTPATIENT)
Dept: ADMINISTRATIVE | Facility: CLINIC | Age: 83
End: 2023-07-21
Payer: MEDICARE

## 2023-07-21 DIAGNOSIS — N18.2 CHRONIC RENAL FAILURE, STAGE 2 (MILD): Primary | ICD-10-CM

## 2023-07-21 LAB
ALBUMIN SERPL BCP-MCNC: 4.2 G/DL (ref 3.5–5.2)
ALP SERPL-CCNC: 68 U/L (ref 38–126)
ALT SERPL W/O P-5'-P-CCNC: 22 U/L (ref 10–44)
ANION GAP SERPL CALC-SCNC: 14 MMOL/L (ref 8–16)
AST SERPL-CCNC: 28 U/L (ref 15–46)
BASOPHILS # BLD AUTO: 0.02 K/UL (ref 0–0.2)
BASOPHILS NFR BLD: 0.4 % (ref 0–1.9)
BILIRUB SERPL-MCNC: 0.7 MG/DL (ref 0.1–1)
BILIRUB UR QL STRIP: NEGATIVE
CALCIUM SERPL-MCNC: 9.1 MG/DL (ref 8.7–10.5)
CHLORIDE SERPL-SCNC: 107 MMOL/L (ref 95–110)
CLARITY UR REFRACT.AUTO: CLEAR
CO2 SERPL-SCNC: 19 MMOL/L (ref 23–29)
COLOR UR AUTO: YELLOW
CREAT SERPL-MCNC: 1.71 MG/DL (ref 0.5–1.4)
CREAT UR-MCNC: 157.4 MG/DL (ref 23–375)
DIFFERENTIAL METHOD: ABNORMAL
EOSINOPHIL # BLD AUTO: 0.2 K/UL (ref 0–0.5)
EOSINOPHIL NFR BLD: 3.4 % (ref 0–8)
ERYTHROCYTE [DISTWIDTH] IN BLOOD BY AUTOMATED COUNT: 14.6 % (ref 11.5–14.5)
EST. GFR  (NO RACE VARIABLE): 40 ML/MIN/1.73 M^2
GLUCOSE SERPL-MCNC: 163 MG/DL (ref 70–110)
GLUCOSE UR QL STRIP: NEGATIVE
HCT VFR BLD AUTO: 32.3 % (ref 40–54)
HGB BLD-MCNC: 10.3 G/DL (ref 14–18)
HGB UR QL STRIP: NEGATIVE
IMM GRANULOCYTES # BLD AUTO: 0.01 K/UL (ref 0–0.04)
IMM GRANULOCYTES NFR BLD AUTO: 0.2 % (ref 0–0.5)
IRON SERPL-MCNC: 63 UG/DL (ref 45–160)
KETONES UR QL STRIP: NEGATIVE
LEUKOCYTE ESTERASE UR QL STRIP: NEGATIVE
LYMPHOCYTES # BLD AUTO: 1.6 K/UL (ref 1–4.8)
LYMPHOCYTES NFR BLD: 33.4 % (ref 18–48)
MAGNESIUM SERPL-MCNC: 2.2 MG/DL (ref 1.6–2.6)
MCH RBC QN AUTO: 29.3 PG (ref 27–31)
MCHC RBC AUTO-ENTMCNC: 31.9 G/DL (ref 32–36)
MCV RBC AUTO: 92 FL (ref 82–98)
MONOCYTES # BLD AUTO: 0.4 K/UL (ref 0.3–1)
MONOCYTES NFR BLD: 8.9 % (ref 4–15)
NEUTROPHILS # BLD AUTO: 2.5 K/UL (ref 1.8–7.7)
NEUTROPHILS NFR BLD: 53.7 % (ref 38–73)
NITRITE UR QL STRIP: NEGATIVE
NRBC BLD-RTO: 0 /100 WBC
PH UR STRIP: 6 [PH] (ref 5–8)
PHOSPHATE SERPL-MCNC: 4.3 MG/DL (ref 2.7–4.5)
PLATELET # BLD AUTO: 145 K/UL (ref 150–450)
PMV BLD AUTO: 12.3 FL (ref 9.2–12.9)
POTASSIUM SERPL-SCNC: 5 MMOL/L (ref 3.5–5.1)
PROT SERPL-MCNC: 8 G/DL (ref 6–8.4)
PROT UR QL STRIP: NEGATIVE
PROT UR-MCNC: <7 MG/DL (ref 0–15)
PROT/CREAT UR: NORMAL MG/G{CREAT} (ref 0–0.2)
PTH-INTACT SERPL-MCNC: 121.9 PG/ML (ref 9–77)
RBC # BLD AUTO: 3.52 M/UL (ref 4.6–6.2)
SATURATED IRON: 18 % (ref 20–50)
SODIUM SERPL-SCNC: 140 MMOL/L (ref 136–145)
SP GR UR STRIP: 1.01 (ref 1–1.03)
TOTAL IRON BINDING CAPACITY: 352 UG/DL (ref 250–450)
TRANSFERRIN SERPL-MCNC: 238 MG/DL (ref 200–375)
URATE SERPL-MCNC: 8.3 MG/DL (ref 3.4–7)
URN SPEC COLLECT METH UR: NORMAL
UROBILINOGEN UR STRIP-ACNC: NEGATIVE EU/DL
UUN UR-MCNC: 23 MG/DL (ref 2–20)
WBC # BLD AUTO: 4.73 K/UL (ref 3.9–12.7)

## 2023-07-21 PROCEDURE — 85025 COMPLETE CBC W/AUTO DIFF WBC: CPT | Mod: PO | Performed by: INTERNAL MEDICINE

## 2023-07-21 PROCEDURE — 84466 ASSAY OF TRANSFERRIN: CPT | Mod: PO | Performed by: INTERNAL MEDICINE

## 2023-07-21 PROCEDURE — 80053 COMPREHEN METABOLIC PANEL: CPT | Mod: PO | Performed by: INTERNAL MEDICINE

## 2023-07-21 PROCEDURE — 83735 ASSAY OF MAGNESIUM: CPT | Mod: PO | Performed by: INTERNAL MEDICINE

## 2023-07-21 PROCEDURE — 84156 ASSAY OF PROTEIN URINE: CPT | Performed by: INTERNAL MEDICINE

## 2023-07-21 PROCEDURE — 83970 ASSAY OF PARATHORMONE: CPT | Mod: PO | Performed by: INTERNAL MEDICINE

## 2023-07-21 PROCEDURE — 84550 ASSAY OF BLOOD/URIC ACID: CPT | Performed by: INTERNAL MEDICINE

## 2023-07-21 PROCEDURE — 81003 URINALYSIS AUTO W/O SCOPE: CPT | Mod: PO | Performed by: INTERNAL MEDICINE

## 2023-07-21 PROCEDURE — 36415 COLL VENOUS BLD VENIPUNCTURE: CPT | Mod: PO | Performed by: INTERNAL MEDICINE

## 2023-07-21 PROCEDURE — 84100 ASSAY OF PHOSPHORUS: CPT | Mod: PO | Performed by: INTERNAL MEDICINE

## 2023-08-14 PROBLEM — E21.3 HYPERPARATHYROIDISM: Status: ACTIVE | Noted: 2019-12-02

## 2023-08-14 PROBLEM — N18.32 STAGE 3B CHRONIC KIDNEY DISEASE: Status: ACTIVE | Noted: 2022-02-17

## 2023-08-14 PROBLEM — M06.9 RHEUMATOID ARTHRITIS: Status: ACTIVE | Noted: 2018-01-05

## 2023-08-14 PROBLEM — N40.0 BENIGN PROSTATIC HYPERPLASIA: Status: ACTIVE | Noted: 2019-12-02

## 2023-08-14 PROBLEM — I50.9 CONGESTIVE HEART FAILURE: Status: ACTIVE | Noted: 2017-11-17

## 2023-09-22 ENCOUNTER — LAB VISIT (OUTPATIENT)
Dept: LAB | Facility: HOSPITAL | Age: 83
End: 2023-09-22
Attending: PHYSICIAN ASSISTANT
Payer: MEDICARE

## 2023-09-22 DIAGNOSIS — I10 HYPERTENSION: ICD-10-CM

## 2023-09-22 DIAGNOSIS — N25.81 SECONDARY HYPERPARATHYROIDISM OF RENAL ORIGIN: ICD-10-CM

## 2023-09-22 DIAGNOSIS — E87.6 HYPOKALEMIA: ICD-10-CM

## 2023-09-22 DIAGNOSIS — N17.9 ACUTE NONTRAUMATIC KIDNEY INJURY: Primary | ICD-10-CM

## 2023-09-22 LAB
ALBUMIN SERPL BCP-MCNC: 4.2 G/DL (ref 3.5–5.2)
ALP SERPL-CCNC: 67 U/L (ref 38–126)
ALT SERPL W/O P-5'-P-CCNC: 17 U/L (ref 10–44)
ANION GAP SERPL CALC-SCNC: 11 MMOL/L (ref 8–16)
AST SERPL-CCNC: 24 U/L (ref 15–46)
BASOPHILS # BLD AUTO: 0.02 K/UL (ref 0–0.2)
BASOPHILS NFR BLD: 0.4 % (ref 0–1.9)
BILIRUB SERPL-MCNC: 0.6 MG/DL (ref 0.1–1)
BILIRUB UR QL STRIP: NEGATIVE
CALCIUM SERPL-MCNC: 9.1 MG/DL (ref 8.7–10.5)
CHLORIDE SERPL-SCNC: 107 MMOL/L (ref 95–110)
CLARITY UR REFRACT.AUTO: CLEAR
CO2 SERPL-SCNC: 22 MMOL/L (ref 23–29)
COLOR UR AUTO: YELLOW
CREAT SERPL-MCNC: 1.32 MG/DL (ref 0.5–1.4)
CREAT UR-MCNC: 139.8 MG/DL (ref 23–375)
DIFFERENTIAL METHOD: ABNORMAL
EOSINOPHIL # BLD AUTO: 0.2 K/UL (ref 0–0.5)
EOSINOPHIL NFR BLD: 3.6 % (ref 0–8)
ERYTHROCYTE [DISTWIDTH] IN BLOOD BY AUTOMATED COUNT: 14.2 % (ref 11.5–14.5)
EST. GFR  (NO RACE VARIABLE): 54.2 ML/MIN/1.73 M^2
GLUCOSE SERPL-MCNC: 72 MG/DL (ref 70–110)
GLUCOSE UR QL STRIP: NEGATIVE
HCT VFR BLD AUTO: 31.3 % (ref 40–54)
HGB BLD-MCNC: 10.1 G/DL (ref 14–18)
HGB UR QL STRIP: NEGATIVE
IMM GRANULOCYTES # BLD AUTO: 0.01 K/UL (ref 0–0.04)
IMM GRANULOCYTES NFR BLD AUTO: 0.2 % (ref 0–0.5)
IRON SERPL-MCNC: 139 UG/DL (ref 45–160)
KETONES UR QL STRIP: NEGATIVE
LEUKOCYTE ESTERASE UR QL STRIP: NEGATIVE
LYMPHOCYTES # BLD AUTO: 1.9 K/UL (ref 1–4.8)
LYMPHOCYTES NFR BLD: 33.5 % (ref 18–48)
MAGNESIUM SERPL-MCNC: 2 MG/DL (ref 1.6–2.6)
MCH RBC QN AUTO: 28.9 PG (ref 27–31)
MCHC RBC AUTO-ENTMCNC: 32.3 G/DL (ref 32–36)
MCV RBC AUTO: 89 FL (ref 82–98)
MONOCYTES # BLD AUTO: 0.7 K/UL (ref 0.3–1)
MONOCYTES NFR BLD: 11.8 % (ref 4–15)
NEUTROPHILS # BLD AUTO: 2.8 K/UL (ref 1.8–7.7)
NEUTROPHILS NFR BLD: 50.5 % (ref 38–73)
NITRITE UR QL STRIP: NEGATIVE
NRBC BLD-RTO: 0 /100 WBC
PH UR STRIP: 6 [PH] (ref 5–8)
PHOSPHATE SERPL-MCNC: 3.9 MG/DL (ref 2.7–4.5)
PLATELET # BLD AUTO: 154 K/UL (ref 150–450)
PMV BLD AUTO: 12.3 FL (ref 9.2–12.9)
POTASSIUM SERPL-SCNC: 4.2 MMOL/L (ref 3.5–5.1)
PROT SERPL-MCNC: 7.7 G/DL (ref 6–8.4)
PROT UR QL STRIP: NEGATIVE
PROT UR-MCNC: 7 MG/DL (ref 0–15)
PROT/CREAT UR: 0.05 MG/G{CREAT} (ref 0–0.2)
PTH-INTACT SERPL-MCNC: 111.4 PG/ML (ref 9–77)
RBC # BLD AUTO: 3.5 M/UL (ref 4.6–6.2)
SATURATED IRON: 44 % (ref 20–50)
SODIUM SERPL-SCNC: 140 MMOL/L (ref 136–145)
SP GR UR STRIP: 1.01 (ref 1–1.03)
TOTAL IRON BINDING CAPACITY: 317 UG/DL (ref 250–450)
TRANSFERRIN SERPL-MCNC: 214 MG/DL (ref 200–375)
URATE SERPL-MCNC: 8.1 MG/DL (ref 3.4–7)
URN SPEC COLLECT METH UR: NORMAL
UROBILINOGEN UR STRIP-ACNC: NEGATIVE EU/DL
UUN UR-MCNC: 18 MG/DL (ref 2–20)
WBC # BLD AUTO: 5.61 K/UL (ref 3.9–12.7)

## 2023-09-22 PROCEDURE — 84466 ASSAY OF TRANSFERRIN: CPT | Mod: PO | Performed by: PHYSICIAN ASSISTANT

## 2023-09-22 PROCEDURE — 83735 ASSAY OF MAGNESIUM: CPT | Mod: PO | Performed by: PHYSICIAN ASSISTANT

## 2023-09-22 PROCEDURE — 81003 URINALYSIS AUTO W/O SCOPE: CPT | Mod: PO | Performed by: PHYSICIAN ASSISTANT

## 2023-09-22 PROCEDURE — 80053 COMPREHEN METABOLIC PANEL: CPT | Mod: PO | Performed by: PHYSICIAN ASSISTANT

## 2023-09-22 PROCEDURE — 84156 ASSAY OF PROTEIN URINE: CPT | Performed by: PHYSICIAN ASSISTANT

## 2023-09-22 PROCEDURE — 83970 ASSAY OF PARATHORMONE: CPT | Mod: PO | Performed by: PHYSICIAN ASSISTANT

## 2023-09-22 PROCEDURE — 85025 COMPLETE CBC W/AUTO DIFF WBC: CPT | Mod: PO | Performed by: PHYSICIAN ASSISTANT

## 2023-09-22 PROCEDURE — 84550 ASSAY OF BLOOD/URIC ACID: CPT | Performed by: PHYSICIAN ASSISTANT

## 2023-09-22 PROCEDURE — 84100 ASSAY OF PHOSPHORUS: CPT | Mod: PO | Performed by: PHYSICIAN ASSISTANT

## 2023-09-22 PROCEDURE — 36415 COLL VENOUS BLD VENIPUNCTURE: CPT | Mod: PO | Performed by: PHYSICIAN ASSISTANT

## 2023-09-22 PROCEDURE — 83540 ASSAY OF IRON: CPT | Mod: PO | Performed by: PHYSICIAN ASSISTANT

## 2024-05-01 ENCOUNTER — LAB VISIT (OUTPATIENT)
Dept: LAB | Facility: HOSPITAL | Age: 84
End: 2024-05-01
Attending: INTERNAL MEDICINE
Payer: MEDICARE

## 2024-05-01 DIAGNOSIS — I10 HYPERTENSION: ICD-10-CM

## 2024-05-01 DIAGNOSIS — N25.81 SECONDARY HYPERPARATHYROIDISM OF RENAL ORIGIN: ICD-10-CM

## 2024-05-01 DIAGNOSIS — N18.31 STAGE 3A CHRONIC KIDNEY DISEASE: Primary | ICD-10-CM

## 2024-05-01 DIAGNOSIS — E87.6 HYPOKALEMIA: ICD-10-CM

## 2024-05-01 LAB
ALBUMIN SERPL BCP-MCNC: 3.7 G/DL (ref 3.5–5.2)
ALP SERPL-CCNC: 70 U/L (ref 38–126)
ALT SERPL W/O P-5'-P-CCNC: 15 U/L (ref 10–44)
ANION GAP SERPL CALC-SCNC: 9 MMOL/L (ref 8–16)
AST SERPL-CCNC: 21 U/L (ref 15–46)
BASOPHILS # BLD AUTO: 0.03 K/UL (ref 0–0.2)
BASOPHILS NFR BLD: 0.6 % (ref 0–1.9)
BILIRUB SERPL-MCNC: 0.8 MG/DL (ref 0.1–1)
BILIRUB UR QL STRIP: NEGATIVE
CALCIUM SERPL-MCNC: 9 MG/DL (ref 8.7–10.5)
CHLORIDE SERPL-SCNC: 108 MMOL/L (ref 95–110)
CLARITY UR REFRACT.AUTO: CLEAR
CO2 SERPL-SCNC: 24 MMOL/L (ref 23–29)
COLOR UR AUTO: YELLOW
CREAT SERPL-MCNC: 1.09 MG/DL (ref 0.5–1.4)
CREAT UR-MCNC: 150.9 MG/DL (ref 23–375)
DIFFERENTIAL METHOD BLD: ABNORMAL
EOSINOPHIL # BLD AUTO: 0.2 K/UL (ref 0–0.5)
EOSINOPHIL NFR BLD: 4 % (ref 0–8)
ERYTHROCYTE [DISTWIDTH] IN BLOOD BY AUTOMATED COUNT: 13.8 % (ref 11.5–14.5)
EST. GFR  (NO RACE VARIABLE): >60 ML/MIN/1.73 M^2
GLUCOSE SERPL-MCNC: 82 MG/DL (ref 70–110)
GLUCOSE UR QL STRIP: NEGATIVE
HCT VFR BLD AUTO: 31.8 % (ref 40–54)
HGB BLD-MCNC: 10.4 G/DL (ref 14–18)
HGB UR QL STRIP: NEGATIVE
IMM GRANULOCYTES # BLD AUTO: 0 K/UL (ref 0–0.04)
IMM GRANULOCYTES NFR BLD AUTO: 0 % (ref 0–0.5)
IRON SERPL-MCNC: 73 UG/DL (ref 45–160)
KETONES UR QL STRIP: NEGATIVE
LEUKOCYTE ESTERASE UR QL STRIP: NEGATIVE
LYMPHOCYTES # BLD AUTO: 1.9 K/UL (ref 1–4.8)
LYMPHOCYTES NFR BLD: 36 % (ref 18–48)
MAGNESIUM SERPL-MCNC: 1.9 MG/DL (ref 1.6–2.6)
MCH RBC QN AUTO: 28.7 PG (ref 27–31)
MCHC RBC AUTO-ENTMCNC: 32.7 G/DL (ref 32–36)
MCV RBC AUTO: 88 FL (ref 82–98)
MONOCYTES # BLD AUTO: 0.7 K/UL (ref 0.3–1)
MONOCYTES NFR BLD: 12.6 % (ref 4–15)
NEUTROPHILS # BLD AUTO: 2.5 K/UL (ref 1.8–7.7)
NEUTROPHILS NFR BLD: 46.8 % (ref 38–73)
NITRITE UR QL STRIP: NEGATIVE
NRBC BLD-RTO: 0 /100 WBC
PH UR STRIP: 6 [PH] (ref 5–8)
PHOSPHATE SERPL-MCNC: 3.7 MG/DL (ref 2.7–4.5)
PLATELET # BLD AUTO: 120 K/UL (ref 150–450)
PMV BLD AUTO: 12.9 FL (ref 9.2–12.9)
POTASSIUM SERPL-SCNC: 4.1 MMOL/L (ref 3.5–5.1)
PROT SERPL-MCNC: 6.8 G/DL (ref 6–8.4)
PROT UR QL STRIP: NEGATIVE
PROT UR-MCNC: 20 MG/DL (ref 0–15)
PROT/CREAT UR: 0.13 MG/G{CREAT} (ref 0–0.2)
PTH-INTACT SERPL-MCNC: 112.4 PG/ML (ref 9–77)
RBC # BLD AUTO: 3.62 M/UL (ref 4.6–6.2)
SATURATED IRON: 25 % (ref 20–50)
SODIUM SERPL-SCNC: 141 MMOL/L (ref 136–145)
SP GR UR STRIP: 1.01 (ref 1–1.03)
TOTAL IRON BINDING CAPACITY: 297 UG/DL (ref 250–450)
TRANSFERRIN SERPL-MCNC: 201 MG/DL (ref 200–375)
URATE SERPL-MCNC: 7.4 MG/DL (ref 3.4–7)
URN SPEC COLLECT METH UR: NORMAL
UROBILINOGEN UR STRIP-ACNC: NEGATIVE EU/DL
UUN UR-MCNC: 21 MG/DL (ref 2–20)
WBC # BLD AUTO: 5.31 K/UL (ref 3.9–12.7)

## 2024-05-01 PROCEDURE — 36415 COLL VENOUS BLD VENIPUNCTURE: CPT | Mod: PN | Performed by: INTERNAL MEDICINE

## 2024-05-01 PROCEDURE — 83540 ASSAY OF IRON: CPT | Mod: PN | Performed by: INTERNAL MEDICINE

## 2024-05-01 PROCEDURE — 84550 ASSAY OF BLOOD/URIC ACID: CPT | Performed by: INTERNAL MEDICINE

## 2024-05-01 PROCEDURE — 83970 ASSAY OF PARATHORMONE: CPT | Mod: PN | Performed by: INTERNAL MEDICINE

## 2024-05-01 PROCEDURE — 81003 URINALYSIS AUTO W/O SCOPE: CPT | Mod: PN | Performed by: INTERNAL MEDICINE

## 2024-05-01 PROCEDURE — 80053 COMPREHEN METABOLIC PANEL: CPT | Mod: PN | Performed by: INTERNAL MEDICINE

## 2024-05-01 PROCEDURE — 84100 ASSAY OF PHOSPHORUS: CPT | Mod: PN | Performed by: INTERNAL MEDICINE

## 2024-05-01 PROCEDURE — 83735 ASSAY OF MAGNESIUM: CPT | Mod: PN | Performed by: INTERNAL MEDICINE

## 2024-05-01 PROCEDURE — 84156 ASSAY OF PROTEIN URINE: CPT | Performed by: INTERNAL MEDICINE

## 2024-05-01 PROCEDURE — 85025 COMPLETE CBC W/AUTO DIFF WBC: CPT | Mod: PN | Performed by: INTERNAL MEDICINE

## 2024-06-04 ENCOUNTER — OFFICE VISIT (OUTPATIENT)
Dept: FAMILY MEDICINE | Facility: CLINIC | Age: 84
End: 2024-06-04
Payer: MEDICARE

## 2024-06-04 VITALS
SYSTOLIC BLOOD PRESSURE: 132 MMHG | WEIGHT: 159.31 LBS | HEART RATE: 73 BPM | HEIGHT: 70 IN | OXYGEN SATURATION: 97 % | BODY MASS INDEX: 22.81 KG/M2 | TEMPERATURE: 98 F | DIASTOLIC BLOOD PRESSURE: 62 MMHG

## 2024-06-04 DIAGNOSIS — E21.3 HYPERPARATHYROIDISM: ICD-10-CM

## 2024-06-04 DIAGNOSIS — N18.31 ANEMIA OF CHRONIC RENAL FAILURE, STAGE 3A: ICD-10-CM

## 2024-06-04 DIAGNOSIS — Z00.00 ENCOUNTER FOR MEDICARE ANNUAL WELLNESS EXAM: Primary | ICD-10-CM

## 2024-06-04 DIAGNOSIS — D63.1 ANEMIA OF CHRONIC RENAL FAILURE, STAGE 3A: ICD-10-CM

## 2024-06-04 DIAGNOSIS — M17.0 PRIMARY OSTEOARTHRITIS OF BOTH KNEES: ICD-10-CM

## 2024-06-04 DIAGNOSIS — I73.9 PERIPHERAL VASCULAR DISEASE, UNSPECIFIED: ICD-10-CM

## 2024-06-04 DIAGNOSIS — I10 HYPERTENSION, UNSPECIFIED TYPE: ICD-10-CM

## 2024-06-04 DIAGNOSIS — N40.0 BENIGN PROSTATIC HYPERPLASIA, UNSPECIFIED WHETHER LOWER URINARY TRACT SYMPTOMS PRESENT: ICD-10-CM

## 2024-06-04 DIAGNOSIS — I25.119 ATHEROSCLEROSIS OF NATIVE CORONARY ARTERY OF NATIVE HEART WITH ANGINA PECTORIS: ICD-10-CM

## 2024-06-04 DIAGNOSIS — M06.9 RHEUMATOID ARTHRITIS, INVOLVING UNSPECIFIED SITE, UNSPECIFIED WHETHER RHEUMATOID FACTOR PRESENT: ICD-10-CM

## 2024-06-04 DIAGNOSIS — I69.354 HEMIPLEGIA AND HEMIPARESIS FOLLOWING CEREBRAL INFARCTION AFFECTING LEFT NON-DOMINANT SIDE: ICD-10-CM

## 2024-06-04 DIAGNOSIS — I50.9 CONGESTIVE HEART FAILURE, UNSPECIFIED HF CHRONICITY, UNSPECIFIED HEART FAILURE TYPE: ICD-10-CM

## 2024-06-04 DIAGNOSIS — Z00.00 ENCOUNTER FOR PREVENTIVE HEALTH EXAMINATION: ICD-10-CM

## 2024-06-04 DIAGNOSIS — N18.31 STAGE 3A CHRONIC KIDNEY DISEASE: ICD-10-CM

## 2024-06-04 PROCEDURE — G0439 PPPS, SUBSEQ VISIT: HCPCS | Mod: S$GLB,,, | Performed by: PHYSICIAN ASSISTANT

## 2024-06-04 NOTE — PROGRESS NOTES
"  Stanley Granados presented for an initial Medicare AWV today. The following components were reviewed and updated:    Medical history  Family History  Social history  Allergies and Current Medications  Health Risk Assessment  Health Maintenance  Care Team    **See Completed Assessments for Annual Wellness visit with in the encounter summary    The following assessments were completed:  Depression Screening  Cognitive function Screening  Timed Get Up Test  Whisper Test      Opioid documentation:      Patient does not have a current opioid prescription.          Vitals:    06/04/24 1413   BP: 132/62   BP Location: Left arm   Patient Position: Sitting   Pulse: 73   Temp: 98.2 °F (36.8 °C)   TempSrc: Oral   SpO2: 97%   Weight: 72.3 kg (159 lb 4.5 oz)   Height: 5' 10" (1.778 m)     Body mass index is 22.85 kg/m².       Physical Exam  Constitutional:       General: He is not in acute distress.     Appearance: Normal appearance.   HENT:      Head: Normocephalic and atraumatic.      Right Ear: Decreased hearing noted.      Left Ear: Decreased hearing noted.   Eyes:      General: Lids are normal. Gaze aligned appropriately.      Pupils: Pupils are equal, round, and reactive to light.   Neck:      Trachea: Trachea normal.   Cardiovascular:      Rate and Rhythm: Normal rate and regular rhythm.      Heart sounds: S1 normal and S2 normal.   Pulmonary:      Effort: Pulmonary effort is normal.      Breath sounds: Normal breath sounds.   Abdominal:      General: Bowel sounds are normal. There is no distension.      Palpations: Abdomen is soft.      Tenderness: There is no abdominal tenderness.   Musculoskeletal:      Cervical back: Neck supple.      Right lower leg: No edema.      Left lower leg: No edema.   Lymphadenopathy:      Cervical: No cervical adenopathy.   Skin:     General: Skin is cool and dry.   Neurological:      Mental Status: He is alert and oriented to person, place, and time.      Comments: Ambulating with use of " rollator   Psychiatric:         Attention and Perception: Attention normal.         Mood and Affect: Mood normal.         Behavior: Behavior is cooperative.         Thought Content: Thought content normal.         Diagnoses and health risks identified today and associated recommendations/orders:    1. Encounter for Medicare annual wellness exam  - Chart reviewed. Problem list updated. Discussed current medical diagnosis, current medications, medical/surgical/family/social history; updated provider list; documented vital signs; identified any cognitive impairment; and updated risk factor list. Addressed any outstanding health maintenance. Provided patient with personalized health advice. Continue to follow up with PCP and any specialists.     2. Encounter for preventive health examination  - Chart reviewed. Problem list updated. Discussed current medical diagnosis, current medications, medical/surgical/family/social history; updated provider list; documented vital signs; identified any cognitive impairment; and updated risk factor list. Addressed any outstanding health maintenance. Provided patient with personalized health advice. Continue to follow up with PCP and any specialists.     3. Hemiplegia and hemiparesis following cerebral infarction affecting left non-dominant side  - Chronic, stable  - Continue to ambulate with care  - Follow with PCP    4. Atherosclerosis of native coronary artery of native heart with angina pectoris  - Chronic, stable  - Continue atorvastatin and BP control  - Follow with cardiology    5. Peripheral vascular disease, unspecified  - Chronic, stable  - Continue atorvastatin  - Follow with cardiology    6. Rheumatoid arthritis, involving unspecified site, unspecified whether rheumatoid factor present  - Chronic, stable  - Continue medications  - Follow with nephrology    7. Congestive heart failure, unspecified HF chronicity, unspecified heart failure type  - Chronic, stable  - Continue  BP control  - Follow with cardiology    8. Stage 3a chronic kidney disease  - Chronic, stable  - Continue medications  - Follow with nephrology    9. Hyperparathyroidism  - Chronic, stable  - Continue medications  - Follow with nephrology    10. Hypertension, unspecified type  - Chronic, stable  - Continue amlodipine, carvedilol  - Follow with cardiology    11. Benign prostatic hyperplasia, unspecified whether lower urinary tract symptoms present  - Chronic, stable  - Continue medications  - Follow with urology    12. Anemia of chronic renal failure, stage 3a  - Chronic, stable  - Continue medications  - Follow with nephrology    13. Primary osteoarthritis of both knees  - Chronic, stable  - Continue medications  - Follow with PCP    14. Body mass index (BMI) of 22.0-22.9 in adult  - Body mass index is 22.85 kg/m².  - Recommendation for healthy diet and increasing exercise as tolerated with goal of 150min/week. Recommend to maintain healthy weight.      Provided Stanley with a 5-10 year written screening schedule and personal prevention plan. Recommendations were developed using the USPSTF age appropriate recommendations. Education, counseling, and referrals were provided as needed.  After Visit Summary printed and given to patient which includes a list of additional screenings\tests needed.    No follow-ups on file.      Nava Duarte PA-C      Advance Care Planning   I offered to discuss advanced care planning, including how to pick a person who would make decisions for you if you were unable to make them for yourself, called a health care power of , and what kind of decisions you might make such as use of life sustaining treatments such as ventilators and tube feeding when faced with a life limiting illness recorded on a living will that they will need to know. (How you want to be cared for as you near the end of your natural life)     X Patient is interested in learning more about how to make advanced  directives.  I provided them paperwork and offered to discuss this with them.

## 2024-06-04 NOTE — PATIENT INSTRUCTIONS
Counseling and Referral of Other Preventative  (Italic type indicates deductible and co-insurance are waived)    Patient Name: Stanley Granados  Today's Date: 6/4/2024    Health Maintenance       Date Due Completion Date    RSV Vaccine (Age 60+ and Pregnant patients) (1 - 1-dose 60+ series) Never done ---    COVID-19 Vaccine (2 - 2023-24 season) 09/01/2023 8/5/2021    Shingles Vaccine (1 of 2) 08/14/2024 (Originally [birth year]/1992) ---    Pneumococcal Vaccines (Age 65+) (1 of 1 - PCV) 08/14/2024 (Originally 9/5/2007) ---    Influenza Vaccine (Season Ended) 09/01/2024 11/2/2016    Lipid Panel 08/04/2027 8/4/2022    TETANUS VACCINE 06/28/2029 6/28/2019        No orders of the defined types were placed in this encounter.      The following information is provided to all patients.  This information is to help you find resources for any of the problems found today that may be affecting your health:                  Living healthy guide: www.[facility].[place].gov      Understanding Diabetes: www.diabetes.org      Eating healthy: www.cdc.gov/healthyweight      CDC home safety checklist: www.cdc.gov/steadi/patient.html      Agency on Aging: www.goea.[place].gov      Alcoholics anonymous (AA): www.aa.org      Physical Activity: www.micaela.nih.gov/cw6jiow      Tobacco use: www.quitwithusla.org

## 2024-10-01 ENCOUNTER — LAB VISIT (OUTPATIENT)
Dept: LAB | Facility: HOSPITAL | Age: 84
End: 2024-10-01
Attending: INTERNAL MEDICINE
Payer: MEDICARE

## 2024-10-01 DIAGNOSIS — I10 HYPERTENSION: ICD-10-CM

## 2024-10-01 DIAGNOSIS — E87.6 HYPOKALEMIA: ICD-10-CM

## 2024-10-01 DIAGNOSIS — N18.9 ANEMIA IN CHRONIC KIDNEY DISEASE (CKD): ICD-10-CM

## 2024-10-01 DIAGNOSIS — N18.31 STAGE 3A CHRONIC KIDNEY DISEASE: Primary | ICD-10-CM

## 2024-10-01 DIAGNOSIS — E79.0 HYPERURICEMIA: ICD-10-CM

## 2024-10-01 DIAGNOSIS — E87.21 ACUTE METABOLIC ACIDOSIS: ICD-10-CM

## 2024-10-01 DIAGNOSIS — D63.1 ANEMIA IN CHRONIC KIDNEY DISEASE (CKD): ICD-10-CM

## 2024-10-01 LAB
ALBUMIN SERPL BCP-MCNC: 3.7 G/DL (ref 3.5–5.2)
ALP SERPL-CCNC: 63 U/L (ref 38–126)
ALT SERPL W/O P-5'-P-CCNC: 11 U/L (ref 10–44)
ANION GAP SERPL CALC-SCNC: 6 MMOL/L (ref 8–16)
AST SERPL-CCNC: 21 U/L (ref 15–46)
BASOPHILS # BLD AUTO: 0.02 K/UL (ref 0–0.2)
BASOPHILS NFR BLD: 0.4 % (ref 0–1.9)
BILIRUB SERPL-MCNC: 0.5 MG/DL (ref 0.1–1)
BILIRUB UR QL STRIP: NEGATIVE
CALCIUM SERPL-MCNC: 8.5 MG/DL (ref 8.7–10.5)
CHLORIDE SERPL-SCNC: 106 MMOL/L (ref 95–110)
CLARITY UR REFRACT.AUTO: CLEAR
CO2 SERPL-SCNC: 28 MMOL/L (ref 23–29)
COLOR UR AUTO: YELLOW
CREAT SERPL-MCNC: 0.94 MG/DL (ref 0.5–1.4)
CREAT UR-MCNC: 44 MG/DL (ref 23–375)
DIFFERENTIAL METHOD BLD: ABNORMAL
EOSINOPHIL # BLD AUTO: 0.2 K/UL (ref 0–0.5)
EOSINOPHIL NFR BLD: 4 % (ref 0–8)
ERYTHROCYTE [DISTWIDTH] IN BLOOD BY AUTOMATED COUNT: 14.3 % (ref 11.5–14.5)
EST. GFR  (NO RACE VARIABLE): >60 ML/MIN/1.73 M^2
GLUCOSE SERPL-MCNC: 90 MG/DL (ref 70–110)
GLUCOSE UR QL STRIP: NEGATIVE
HCT VFR BLD AUTO: 31.5 % (ref 40–54)
HGB BLD-MCNC: 9.8 G/DL (ref 14–18)
HGB UR QL STRIP: NEGATIVE
IMM GRANULOCYTES # BLD AUTO: 0.01 K/UL (ref 0–0.04)
IMM GRANULOCYTES NFR BLD AUTO: 0.2 % (ref 0–0.5)
KETONES UR QL STRIP: NEGATIVE
LEUKOCYTE ESTERASE UR QL STRIP: NEGATIVE
LYMPHOCYTES # BLD AUTO: 1.9 K/UL (ref 1–4.8)
LYMPHOCYTES NFR BLD: 35.8 % (ref 18–48)
MAGNESIUM SERPL-MCNC: 1.8 MG/DL (ref 1.6–2.6)
MCH RBC QN AUTO: 28.3 PG (ref 27–31)
MCHC RBC AUTO-ENTMCNC: 31.1 G/DL (ref 32–36)
MCV RBC AUTO: 91 FL (ref 82–98)
MONOCYTES # BLD AUTO: 0.6 K/UL (ref 0.3–1)
MONOCYTES NFR BLD: 11.3 % (ref 4–15)
NEUTROPHILS # BLD AUTO: 2.5 K/UL (ref 1.8–7.7)
NEUTROPHILS NFR BLD: 48.3 % (ref 38–73)
NITRITE UR QL STRIP: NEGATIVE
NRBC BLD-RTO: 0 /100 WBC
PH UR STRIP: 6 [PH] (ref 5–8)
PHOSPHATE SERPL-MCNC: 3.4 MG/DL (ref 2.7–4.5)
PLATELET # BLD AUTO: 123 K/UL (ref 150–450)
PMV BLD AUTO: 12.9 FL (ref 9.2–12.9)
POTASSIUM SERPL-SCNC: 3.7 MMOL/L (ref 3.5–5.1)
PROT SERPL-MCNC: 7 G/DL (ref 6–8.4)
PROT UR QL STRIP: NEGATIVE
PROT UR-MCNC: 19 MG/DL (ref 0–15)
PROT/CREAT UR: 0.43 MG/G{CREAT} (ref 0–0.2)
PTH-INTACT SERPL-MCNC: 93.5 PG/ML (ref 9–77)
RBC # BLD AUTO: 3.46 M/UL (ref 4.6–6.2)
SODIUM SERPL-SCNC: 140 MMOL/L (ref 136–145)
SP GR UR STRIP: 1.01 (ref 1–1.03)
URATE SERPL-MCNC: 7.2 MG/DL (ref 3.4–7)
URN SPEC COLLECT METH UR: NORMAL
UROBILINOGEN UR STRIP-ACNC: NEGATIVE EU/DL
UUN UR-MCNC: 13 MG/DL (ref 2–20)
WBC # BLD AUTO: 5.23 K/UL (ref 3.9–12.7)

## 2024-10-01 PROCEDURE — 81003 URINALYSIS AUTO W/O SCOPE: CPT | Mod: PN | Performed by: INTERNAL MEDICINE

## 2024-10-01 PROCEDURE — 80053 COMPREHEN METABOLIC PANEL: CPT | Mod: PN | Performed by: INTERNAL MEDICINE

## 2024-10-01 PROCEDURE — 83970 ASSAY OF PARATHORMONE: CPT | Mod: PN | Performed by: INTERNAL MEDICINE

## 2024-10-01 PROCEDURE — 84100 ASSAY OF PHOSPHORUS: CPT | Mod: PN | Performed by: INTERNAL MEDICINE

## 2024-10-01 PROCEDURE — 83735 ASSAY OF MAGNESIUM: CPT | Mod: PN | Performed by: INTERNAL MEDICINE

## 2024-10-01 PROCEDURE — 84156 ASSAY OF PROTEIN URINE: CPT | Performed by: INTERNAL MEDICINE

## 2024-10-01 PROCEDURE — 36415 COLL VENOUS BLD VENIPUNCTURE: CPT | Mod: PN | Performed by: INTERNAL MEDICINE

## 2024-10-01 PROCEDURE — 84550 ASSAY OF BLOOD/URIC ACID: CPT | Performed by: INTERNAL MEDICINE

## 2024-10-01 PROCEDURE — 85025 COMPLETE CBC W/AUTO DIFF WBC: CPT | Mod: PN | Performed by: INTERNAL MEDICINE

## 2024-11-17 ENCOUNTER — HOSPITAL ENCOUNTER (INPATIENT)
Facility: HOSPITAL | Age: 84
LOS: 2 days | Discharge: HOME HEALTH | DRG: 194 | End: 2024-11-20
Attending: STUDENT IN AN ORGANIZED HEALTH CARE EDUCATION/TRAINING PROGRAM | Admitting: FAMILY MEDICINE
Payer: MEDICARE

## 2024-11-17 DIAGNOSIS — I50.9 CHF (CONGESTIVE HEART FAILURE): ICD-10-CM

## 2024-11-17 DIAGNOSIS — R05.9 COUGH: ICD-10-CM

## 2024-11-17 DIAGNOSIS — R07.9 CHEST PAIN: ICD-10-CM

## 2024-11-17 DIAGNOSIS — I50.9 ACUTE ON CHRONIC CONGESTIVE HEART FAILURE, UNSPECIFIED HEART FAILURE TYPE: Primary | ICD-10-CM

## 2024-11-17 DIAGNOSIS — J18.9 PNEUMONIA: ICD-10-CM

## 2024-11-17 PROBLEM — R50.9 FEVER: Status: ACTIVE | Noted: 2024-11-17

## 2024-11-17 PROBLEM — E87.6 HYPOKALEMIA: Status: ACTIVE | Noted: 2024-11-17

## 2024-11-17 LAB
ALBUMIN SERPL BCP-MCNC: 3.4 G/DL (ref 3.5–5.2)
ALP SERPL-CCNC: 60 U/L (ref 38–126)
ALT SERPL W/O P-5'-P-CCNC: 14 U/L (ref 10–44)
ANION GAP SERPL CALC-SCNC: 7 MMOL/L (ref 8–16)
AST SERPL-CCNC: 22 U/L (ref 15–46)
BASOPHILS # BLD AUTO: 0.02 K/UL (ref 0–0.2)
BASOPHILS NFR BLD: 0.3 % (ref 0–1.9)
BILIRUB SERPL-MCNC: 0.5 MG/DL (ref 0.1–1)
BILIRUB UR QL STRIP: NEGATIVE
CALCIUM SERPL-MCNC: 8 MG/DL (ref 8.7–10.5)
CHLORIDE SERPL-SCNC: 106 MMOL/L (ref 95–110)
CLARITY UR: CLEAR
CO2 SERPL-SCNC: 29 MMOL/L (ref 23–29)
COLOR UR: COLORLESS
CREAT SERPL-MCNC: 0.85 MG/DL (ref 0.5–1.4)
DIFFERENTIAL METHOD BLD: ABNORMAL
EOSINOPHIL # BLD AUTO: 0.2 K/UL (ref 0–0.5)
EOSINOPHIL NFR BLD: 3.7 % (ref 0–8)
ERYTHROCYTE [DISTWIDTH] IN BLOOD BY AUTOMATED COUNT: 14 % (ref 11.5–14.5)
EST. GFR  (NO RACE VARIABLE): >60 ML/MIN/1.73 M^2
GLUCOSE SERPL-MCNC: 124 MG/DL (ref 70–110)
GLUCOSE UR QL STRIP: NEGATIVE
HCT VFR BLD AUTO: 26.7 % (ref 40–54)
HGB BLD-MCNC: 8.6 G/DL (ref 14–18)
HGB UR QL STRIP: NEGATIVE
IMM GRANULOCYTES # BLD AUTO: 0.01 K/UL (ref 0–0.04)
IMM GRANULOCYTES NFR BLD AUTO: 0.2 % (ref 0–0.5)
KETONES UR QL STRIP: NEGATIVE
LEUKOCYTE ESTERASE UR QL STRIP: NEGATIVE
LIPASE SERPL-CCNC: 47 U/L (ref 23–300)
LYMPHOCYTES # BLD AUTO: 1.2 K/UL (ref 1–4.8)
LYMPHOCYTES NFR BLD: 19.6 % (ref 18–48)
MAGNESIUM SERPL-MCNC: 1.7 MG/DL (ref 1.6–2.6)
MCH RBC QN AUTO: 28.6 PG (ref 27–31)
MCHC RBC AUTO-ENTMCNC: 32.2 G/DL (ref 32–36)
MCV RBC AUTO: 89 FL (ref 82–98)
MONOCYTES # BLD AUTO: 0.5 K/UL (ref 0.3–1)
MONOCYTES NFR BLD: 8.2 % (ref 4–15)
NEUTROPHILS # BLD AUTO: 4.1 K/UL (ref 1.8–7.7)
NEUTROPHILS NFR BLD: 68 % (ref 38–73)
NITRITE UR QL STRIP: NEGATIVE
NRBC BLD-RTO: 0 /100 WBC
NT-PROBNP SERPL-MCNC: 8320 PG/ML (ref 5–1800)
PH UR STRIP: 7 [PH] (ref 5–8)
PLATELET # BLD AUTO: 238 K/UL (ref 150–450)
PMV BLD AUTO: 11.6 FL (ref 9.2–12.9)
POTASSIUM SERPL-SCNC: 2.9 MMOL/L (ref 3.5–5.1)
PROT SERPL-MCNC: 6.8 G/DL (ref 6–8.4)
PROT UR QL STRIP: NEGATIVE
RBC # BLD AUTO: 3.01 M/UL (ref 4.6–6.2)
SODIUM SERPL-SCNC: 142 MMOL/L (ref 136–145)
SP GR UR STRIP: 1.01 (ref 1–1.03)
TROPONIN I SERPL DL<=0.01 NG/ML-MCNC: 0.04 NG/ML (ref 0–0.03)
TROPONIN I SERPL-MCNC: 0.02 NG/ML (ref 0.01–0.03)
TROPONIN I SERPL-MCNC: 0.02 NG/ML (ref 0.01–0.03)
URN SPEC COLLECT METH UR: ABNORMAL
UROBILINOGEN UR STRIP-ACNC: NEGATIVE EU/DL
UUN UR-MCNC: 15 MG/DL (ref 2–20)
WBC # BLD AUTO: 5.97 K/UL (ref 3.9–12.7)

## 2024-11-17 PROCEDURE — 83880 ASSAY OF NATRIURETIC PEPTIDE: CPT | Mod: ER | Performed by: STUDENT IN AN ORGANIZED HEALTH CARE EDUCATION/TRAINING PROGRAM

## 2024-11-17 PROCEDURE — 96372 THER/PROPH/DIAG INJ SC/IM: CPT | Performed by: INTERNAL MEDICINE

## 2024-11-17 PROCEDURE — 36415 COLL VENOUS BLD VENIPUNCTURE: CPT | Performed by: INTERNAL MEDICINE

## 2024-11-17 PROCEDURE — 25000003 PHARM REV CODE 250: Mod: ER | Performed by: STUDENT IN AN ORGANIZED HEALTH CARE EDUCATION/TRAINING PROGRAM

## 2024-11-17 PROCEDURE — 81003 URINALYSIS AUTO W/O SCOPE: CPT | Performed by: INTERNAL MEDICINE

## 2024-11-17 PROCEDURE — 83690 ASSAY OF LIPASE: CPT | Mod: ER | Performed by: STUDENT IN AN ORGANIZED HEALTH CARE EDUCATION/TRAINING PROGRAM

## 2024-11-17 PROCEDURE — 99285 EMERGENCY DEPT VISIT HI MDM: CPT | Mod: 25,ER

## 2024-11-17 PROCEDURE — 25000003 PHARM REV CODE 250: Performed by: INTERNAL MEDICINE

## 2024-11-17 PROCEDURE — 94761 N-INVAS EAR/PLS OXIMETRY MLT: CPT | Mod: ER

## 2024-11-17 PROCEDURE — 87040 BLOOD CULTURE FOR BACTERIA: CPT | Performed by: INTERNAL MEDICINE

## 2024-11-17 PROCEDURE — G0378 HOSPITAL OBSERVATION PER HR: HCPCS

## 2024-11-17 PROCEDURE — 85025 COMPLETE CBC W/AUTO DIFF WBC: CPT | Mod: ER | Performed by: STUDENT IN AN ORGANIZED HEALTH CARE EDUCATION/TRAINING PROGRAM

## 2024-11-17 PROCEDURE — 83735 ASSAY OF MAGNESIUM: CPT | Mod: ER | Performed by: STUDENT IN AN ORGANIZED HEALTH CARE EDUCATION/TRAINING PROGRAM

## 2024-11-17 PROCEDURE — 80053 COMPREHEN METABOLIC PANEL: CPT | Mod: ER | Performed by: STUDENT IN AN ORGANIZED HEALTH CARE EDUCATION/TRAINING PROGRAM

## 2024-11-17 PROCEDURE — 96374 THER/PROPH/DIAG INJ IV PUSH: CPT | Mod: ER

## 2024-11-17 PROCEDURE — G0378 HOSPITAL OBSERVATION PER HR: HCPCS | Mod: ER

## 2024-11-17 PROCEDURE — 63600175 PHARM REV CODE 636 W HCPCS: Performed by: INTERNAL MEDICINE

## 2024-11-17 PROCEDURE — 25500020 PHARM REV CODE 255: Mod: ER | Performed by: STUDENT IN AN ORGANIZED HEALTH CARE EDUCATION/TRAINING PROGRAM

## 2024-11-17 PROCEDURE — 84484 ASSAY OF TROPONIN QUANT: CPT | Mod: 91 | Performed by: INTERNAL MEDICINE

## 2024-11-17 PROCEDURE — 0241U SARS-COV2 (COVID) WITH FLU/RSV BY PCR: CPT | Performed by: INTERNAL MEDICINE

## 2024-11-17 PROCEDURE — 63600175 PHARM REV CODE 636 W HCPCS: Mod: ER | Performed by: STUDENT IN AN ORGANIZED HEALTH CARE EDUCATION/TRAINING PROGRAM

## 2024-11-17 PROCEDURE — 84484 ASSAY OF TROPONIN QUANT: CPT | Mod: ER | Performed by: STUDENT IN AN ORGANIZED HEALTH CARE EDUCATION/TRAINING PROGRAM

## 2024-11-17 RX ORDER — HYDRALAZINE HYDROCHLORIDE 20 MG/ML
10 INJECTION INTRAMUSCULAR; INTRAVENOUS
Status: COMPLETED | OUTPATIENT
Start: 2024-11-17 | End: 2024-11-17

## 2024-11-17 RX ORDER — FUROSEMIDE 10 MG/ML
40 INJECTION INTRAMUSCULAR; INTRAVENOUS
Status: COMPLETED | OUTPATIENT
Start: 2024-11-17 | End: 2024-11-17

## 2024-11-17 RX ORDER — AMLODIPINE BESYLATE 5 MG/1
5 TABLET ORAL DAILY
Status: DISCONTINUED | OUTPATIENT
Start: 2024-11-18 | End: 2024-11-20 | Stop reason: HOSPADM

## 2024-11-17 RX ORDER — ENOXAPARIN SODIUM 100 MG/ML
40 INJECTION SUBCUTANEOUS EVERY 24 HOURS
Status: DISCONTINUED | OUTPATIENT
Start: 2024-11-17 | End: 2024-11-20 | Stop reason: HOSPADM

## 2024-11-17 RX ORDER — SODIUM BICARBONATE 650 MG/1
650 TABLET ORAL 2 TIMES DAILY
Status: DISCONTINUED | OUTPATIENT
Start: 2024-11-17 | End: 2024-11-20 | Stop reason: HOSPADM

## 2024-11-17 RX ORDER — CEFTRIAXONE 1 G/1
1 INJECTION, POWDER, FOR SOLUTION INTRAMUSCULAR; INTRAVENOUS
Status: DISCONTINUED | OUTPATIENT
Start: 2024-11-17 | End: 2024-11-20 | Stop reason: HOSPADM

## 2024-11-17 RX ORDER — NALOXONE HCL 0.4 MG/ML
0.02 VIAL (ML) INJECTION
Status: DISCONTINUED | OUTPATIENT
Start: 2024-11-17 | End: 2024-11-20 | Stop reason: HOSPADM

## 2024-11-17 RX ORDER — CETIRIZINE HYDROCHLORIDE 10 MG/1
10 TABLET ORAL DAILY
Status: DISCONTINUED | OUTPATIENT
Start: 2024-11-18 | End: 2024-11-20 | Stop reason: HOSPADM

## 2024-11-17 RX ORDER — CARVEDILOL 25 MG/1
25 TABLET ORAL 2 TIMES DAILY
Status: DISCONTINUED | OUTPATIENT
Start: 2024-11-17 | End: 2024-11-20 | Stop reason: HOSPADM

## 2024-11-17 RX ORDER — ASPIRIN 325 MG
325 TABLET, DELAYED RELEASE (ENTERIC COATED) ORAL
Status: COMPLETED | OUTPATIENT
Start: 2024-11-17 | End: 2024-11-17

## 2024-11-17 RX ORDER — FERROUS GLUCONATE 324(37.5)
324 TABLET ORAL EVERY MORNING
Status: DISCONTINUED | OUTPATIENT
Start: 2024-11-18 | End: 2024-11-20 | Stop reason: HOSPADM

## 2024-11-17 RX ORDER — HYDRALAZINE HYDROCHLORIDE 20 MG/ML
10 INJECTION INTRAMUSCULAR; INTRAVENOUS EVERY 4 HOURS PRN
Status: DISCONTINUED | OUTPATIENT
Start: 2024-11-17 | End: 2024-11-20 | Stop reason: HOSPADM

## 2024-11-17 RX ORDER — ACETAMINOPHEN 325 MG/1
650 TABLET ORAL EVERY 4 HOURS PRN
Status: DISCONTINUED | OUTPATIENT
Start: 2024-11-17 | End: 2024-11-20 | Stop reason: HOSPADM

## 2024-11-17 RX ORDER — CARVEDILOL 12.5 MG/1
25 TABLET ORAL
Status: COMPLETED | OUTPATIENT
Start: 2024-11-17 | End: 2024-11-17

## 2024-11-17 RX ORDER — ATORVASTATIN CALCIUM 40 MG/1
40 TABLET, FILM COATED ORAL NIGHTLY
Status: DISCONTINUED | OUTPATIENT
Start: 2024-11-17 | End: 2024-11-20 | Stop reason: HOSPADM

## 2024-11-17 RX ORDER — ONDANSETRON HYDROCHLORIDE 2 MG/ML
4 INJECTION, SOLUTION INTRAVENOUS
Status: COMPLETED | OUTPATIENT
Start: 2024-11-17 | End: 2024-11-17

## 2024-11-17 RX ORDER — MORPHINE SULFATE 4 MG/ML
4 INJECTION, SOLUTION INTRAMUSCULAR; INTRAVENOUS
Status: COMPLETED | OUTPATIENT
Start: 2024-11-17 | End: 2024-11-17

## 2024-11-17 RX ORDER — POTASSIUM CHLORIDE 750 MG/1
10 TABLET, EXTENDED RELEASE ORAL ONCE
Status: COMPLETED | OUTPATIENT
Start: 2024-11-17 | End: 2024-11-17

## 2024-11-17 RX ORDER — FUROSEMIDE 10 MG/ML
40 INJECTION INTRAMUSCULAR; INTRAVENOUS EVERY 12 HOURS
Status: DISCONTINUED | OUTPATIENT
Start: 2024-11-17 | End: 2024-11-18

## 2024-11-17 RX ORDER — AZITHROMYCIN 250 MG/1
500 TABLET, FILM COATED ORAL DAILY
Status: COMPLETED | OUTPATIENT
Start: 2024-11-18 | End: 2024-11-20

## 2024-11-17 RX ORDER — LANOLIN ALCOHOL/MO/W.PET/CERES
400 CREAM (GRAM) TOPICAL DAILY
Status: DISCONTINUED | OUTPATIENT
Start: 2024-11-18 | End: 2024-11-20 | Stop reason: HOSPADM

## 2024-11-17 RX ORDER — POTASSIUM CHLORIDE 750 MG/1
10 TABLET, EXTENDED RELEASE ORAL DAILY
Status: DISCONTINUED | OUTPATIENT
Start: 2024-11-18 | End: 2024-11-20 | Stop reason: HOSPADM

## 2024-11-17 RX ADMIN — FUROSEMIDE 40 MG: 10 INJECTION, SOLUTION INTRAMUSCULAR; INTRAVENOUS at 09:11

## 2024-11-17 RX ADMIN — POTASSIUM CHLORIDE 10 MEQ: 750 TABLET, EXTENDED RELEASE ORAL at 11:11

## 2024-11-17 RX ADMIN — ATORVASTATIN CALCIUM 40 MG: 40 TABLET, FILM COATED ORAL at 09:11

## 2024-11-17 RX ADMIN — CARVEDILOL 25 MG: 25 TABLET, FILM COATED ORAL at 09:11

## 2024-11-17 RX ADMIN — CARVEDILOL 25 MG: 12.5 TABLET, FILM COATED ORAL at 12:11

## 2024-11-17 RX ADMIN — HYDRALAZINE HYDROCHLORIDE 10 MG: 20 INJECTION, SOLUTION INTRAMUSCULAR; INTRAVENOUS at 04:11

## 2024-11-17 RX ADMIN — ACETAMINOPHEN 650 MG: 325 TABLET ORAL at 06:11

## 2024-11-17 RX ADMIN — FUROSEMIDE 40 MG: 10 INJECTION, SOLUTION INTRAVENOUS at 11:11

## 2024-11-17 RX ADMIN — CEFTRIAXONE SODIUM 1 G: 1 INJECTION, POWDER, FOR SOLUTION INTRAMUSCULAR; INTRAVENOUS at 09:11

## 2024-11-17 RX ADMIN — FUROSEMIDE 40 MG: 10 INJECTION, SOLUTION INTRAVENOUS at 04:11

## 2024-11-17 RX ADMIN — ONDANSETRON 4 MG: 2 INJECTION INTRAMUSCULAR; INTRAVENOUS at 02:11

## 2024-11-17 RX ADMIN — HYDRALAZINE HYDROCHLORIDE 10 MG: 20 INJECTION, SOLUTION INTRAMUSCULAR; INTRAVENOUS at 12:11

## 2024-11-17 RX ADMIN — IOHEXOL 100 ML: 350 INJECTION, SOLUTION INTRAVENOUS at 03:11

## 2024-11-17 RX ADMIN — MORPHINE SULFATE 4 MG: 4 INJECTION INTRAVENOUS at 02:11

## 2024-11-17 RX ADMIN — SODIUM BICARBONATE 650 MG TABLET 650 MG: at 09:11

## 2024-11-17 RX ADMIN — ENOXAPARIN SODIUM 40 MG: 40 INJECTION SUBCUTANEOUS at 09:11

## 2024-11-17 RX ADMIN — ASPIRIN 325 MG: 325 TABLET, COATED ORAL at 01:11

## 2024-11-17 NOTE — NURSING
Received report from Ed nurse. Pt arrived on unit. VS taken, VN notified, tele box placed. Fall bracelets and socks placed.Instructed pt to call for assistance.

## 2024-11-17 NOTE — ED PROVIDER NOTES
NAME:  Stanley Granados  [facility]:     [id number]  MRN:    [id number]  ADMIT DATE: 11/17/2024        eMERGENCY dEPARTMENT eNCOUnter    CHIEF COMPLAINT    Chief Complaint   Patient presents with    Chest Pain     Left side chest pain that radiates down the left arm. Pain started this morning.        HPI    Stanley Granados is a 84 y.o. male with a past medical history of  has a past medical history of Arthritis, Hypertension, Renal disorder, and Stroke.     he presents to the ED due to Left-sided chest pain that started this morning.  He points to 1 spot and states that it is only in this 1 spot which is the left anterior chest wall at the mid axillary line and extending to the left upper quadrant.  As it time of my assessment he states that his symptoms are improving and he was not sure he was in pain anymore.  He states he ran out of his blood pressure pills and has not been taking them.  He denies any shortness of breath, nausea or vomiting.  States that he did not eat this morning as he did not have a good appetite.  His daughters at bedside with him but states that he only recently moved in with her.  He does have left-sided residual deficits from a previous stroke.      HPI       PAST MEDICAL HISTORY  Past Medical History:   Diagnosis Date    Arthritis     Hypertension     Renal disorder     Stroke        SURGICAL HISTORY    History reviewed. No pertinent surgical history.    FAMILY HISTORY    Family History   Problem Relation Name Age of Onset    Hypertension Mother      COPD Father      No Known Problems Sister Zunilda     No Known Problems Brother Rowdy Hough     Hypertension Brother Jaxon     Diabetes Brother Jaxon     Hypertension Daughter x3     Hyperlipidemia Daughter x3     Diabetes Daughter x3     Lupus Daughter x3     Lymphoma Daughter x1     Hypertension Son x2     Diabetes Son x2        SOCIAL HISTORY    Social History     Socioeconomic History    Marital status: Single   Tobacco Use    Smoking status: Former      Current packs/day: 0.00     Types: Cigarettes     Quit date: 1994     Years since quittin.4     Passive exposure: Never    Smokeless tobacco: Never   Substance and Sexual Activity    Alcohol use: Not Currently    Drug use: Not Currently    Sexual activity: Not Currently   Social History Narrative    ** Merged History Encounter **          Social Drivers of Health     Financial Resource Strain: Low Risk  (2024)    Overall Financial Resource Strain (CARDIA)     Difficulty of Paying Living Expenses: Not hard at all   Food Insecurity: No Food Insecurity (2024)    Hunger Vital Sign     Worried About Running Out of Food in the Last Year: Never true     Ran Out of Food in the Last Year: Never true   Transportation Needs: No Transportation Needs (2024)    PRAPARE - Transportation     Lack of Transportation (Medical): No     Lack of Transportation (Non-Medical): No   Physical Activity: Sufficiently Active (2024)    Exercise Vital Sign     Days of Exercise per Week: 5 days     Minutes of Exercise per Session: 60 min   Stress: No Stress Concern Present (2024)    [employer] [place] [employer] - Occupational Stress Questionnaire     Feeling of Stress : Not at all   Housing Stability: Low Risk  (2024)    Housing Stability Vital Sign     Unable to Pay for Housing in the Last Year: No     Homeless in the Last Year: No       MEDICATIONS  Current Outpatient Medications   Medication Instructions    amLODIPine (NORVASC) 5 mg, Oral    atorvastatin (LIPITOR) 40 mg, Oral    carvediloL (COREG) 25 mg, Oral, 2 times daily    ferrous gluconate (FERGON) 324 MG tablet 1 tablet, Oral, Every morning    fexofenadine (ALLEGRA) 180 mg, Oral, Daily    magnesium oxide (MAG-OX) 400 mg (241.3 mg magnesium) tablet 1 tablet, Oral, Daily    nitroGLYCERIN (NITROSTAT) 0.4 mg, Sublingual    potassium chloride (KLOR-CON) 10 MEQ TbSR 10 mEq, Oral    ranolazine (RANEXA) 1,000 mg, Oral, 2 times daily    sodium  "bicarbonate 650 mg, Oral, 2 times daily       ALLERGIES    Review of patient's allergies indicates:   Allergen Reactions    Nsaids (non-steroidal anti-inflammatory drug)          REVIEW OF SYSTEMS   Review of Systems       PHYSICAL EXAM    Reviewed Triage Note    VITAL SIGNS:   ED Triage Vitals [11/17/24 0914]   Encounter Vitals Group      BP (!) 185/88      Systolic BP Percentile       Diastolic BP Percentile       Pulse 71      Resp 18      Temp 97.7 °F (36.5 °C)      Temp src       SpO2 99 %      Weight 150 lb      Height 5' 10"      Head Circumference       Peak Flow       Pain Score       Pain Loc       Pain Education       Exclude from Growth Chart        Patient Vitals for the past 24 hrs:   BP Temp Pulse Resp SpO2 Height Weight   11/17/24 0931 -- -- 66 20 99 % -- --   11/17/24 0914 (!) 185/88 97.7 °F (36.5 °C) 71 18 99 % 5' 10" (1.778 m) 68 kg (150 lb)       Physical Exam    Nursing note and vitals reviewed.  Constitutional: He appears well-developed and well-nourished.   HENT:   Head: Normocephalic and atraumatic.   Eyes: EOM are normal. Pupils are equal, round, and reactive to light.   Neck: Neck supple.   Normal range of motion.  Cardiovascular:  Normal rate and regular rhythm.           Pulmonary/Chest: Breath sounds normal. No respiratory distress. He exhibits tenderness (L anterior chestwall-point tenderness under L breast).   Abdominal: Abdomen is soft. There is no abdominal tenderness.   Musculoskeletal:      Cervical back: Normal range of motion and neck supple.      Comments: Chronic left-sided weakness from previous stroke     Neurological: He is alert and oriented to person, place, and time. No cranial nerve deficit. GCS score is 15. GCS eye subscore is 4. GCS verbal subscore is 5. GCS motor subscore is 6.   Skin: Skin is warm and dry.   Psychiatric: He has a normal mood and affect.                EKG     Interpreted by EM physician if performed:   Sinus rhythm at a rate of 66.  First-degree AV " block with a WI interval of 240.  QTC prolongation noted at 5:05 a.m..  No ST elevation or depression.  There is T-wave flattening in 1 and aVL.            LABS  Pertinent labs reviewed. (See chart for details)   Labs Reviewed   CBC W/ AUTO DIFFERENTIAL - Abnormal       Result Value    WBC 5.97      RBC 3.01 (*)     Hemoglobin 8.6 (*)     Hematocrit 26.7 (*)     MCV 89      MCH 28.6      MCHC 32.2      RDW 14.0      Platelets 238      MPV 11.6      Immature Granulocytes 0.2      Gran # (ANC) 4.1      Immature Grans (Abs) 0.01      Lymph # 1.2      Mono # 0.5      Eos # 0.2      Baso # 0.02      nRBC 0      Gran % 68.0      Lymph % 19.6      Mono % 8.2      Eosinophil % 3.7      Basophil % 0.3      Differential Method Automated     COMPREHENSIVE METABOLIC PANEL - Abnormal    Sodium 142      Potassium 2.9 (*)     Chloride 106      CO2 29      Glucose 124 (*)     BUN 15      Creatinine 0.85      Calcium 8.0 (*)     Total Protein 6.8      Albumin 3.4 (*)     Total Bilirubin 0.5      Alkaline Phosphatase 60      AST 22      ALT 14      Anion Gap 7 (*)     eGFR >60.0     NT-PRO NATRIURETIC PEPTIDE - Abnormal    NT-proBNP 8320 (*)    TROPONIN I    Troponin I 0.023     LIPASE    Lipase Result 47     TROPONIN I         RADIOLOGY          Imaging Results              X-Ray Chest AP Portable (Final result)  Result time 11/17/24 10:02:46      Final result by Dwaine Alan MD (Timothy) (11/17/24 10:02:46)                   Impression:      Findings suggestive mild interstitial edema.      Electronically signed by: Dwaine Alan MD  Date:    11/17/2024  Time:    10:02               Narrative:    EXAMINATION:  XR CHEST AP PORTABLE    CLINICAL HISTORY:  Chest pain,    COMPARISON:  Chest, 01/24/2022    FINDINGS:  One view.  Cardiomegaly.  Atherosclerosis of thoracic aorta.  Mild interstitial prominence.                                        PROCEDURES    Critical Care    Date/Time: 11/17/2024 5:26 PM    Performed by: Yolande  Jessika PLUNKETT DO  Authorized by: Jessika Lanier DO  Direct patient critical care time: 17 minutes  Additional history critical care time: 7 minutes  Ordering / reviewing critical care time: 10 minutes  Documentation critical care time: 10 minutes  Consulting other physicians critical care time: 5 minutes  Total critical care time (exclusive of procedural time) : 49 minutes  Critical care was necessary to treat or prevent imminent or life-threatening deterioration of the following conditions: cardiac failure (Hypertensive crisis).            ED COURSE & MEDICAL DECISION MAKING    Pertinent Labs & Imaging studies reviewed. (See chart for details and specific orders.)          Summary of review of records:   Patient follows closely with nephrology with lab work obtained in early October from this standpoint.    Last primary care visit was in June of this year documented history of CHF though do not see any recent cardiology visits through care everywhere or recent echocardiogram.  Unclear of what medications he was on from this standpoint.    Medical Decision Making    Stanley Granados is a 84 y.o. male presents for left-sided chest pain and left upper quadrant pain since this morning.  At the time of my assessment he was asymptomatic.  Notes decreased appetite but no other symptoms.    Differential includes but is not limited to hypertensive urgency versus emergency, CHF exacerbation, pneumonia, ACS, pancreatitis, chest wall strain            Medications   potassium chloride SA CR tablet 10 mEq (10 mEq Oral Given 11/17/24 1114)   furosemide injection 40 mg (40 mg Intravenous Given 11/17/24 1110)   hydrALAZINE injection 10 mg (10 mg Intravenous Given 11/17/24 1246)   carvediloL tablet 25 mg (25 mg Oral Given 11/17/24 1228)   aspirin EC tablet 325 mg (325 mg Oral Given 11/17/24 1323)   morphine injection 4 mg (4 mg Intravenous Given 11/17/24 1427)   ondansetron injection 4 mg (4 mg Intravenous Given 11/17/24 1425)   iohexoL  (OMNIPAQUE 350) injection 100 mL (100 mLs Intravenous Given 11/17/24 1516)   hydrALAZINE injection 10 mg (10 mg Intravenous Given 11/17/24 1620)   furosemide injection 40 mg (40 mg Intravenous Given 11/17/24 1617)       ED Course as of 11/17/24 1722   Sun Nov 17, 2024   1039 Troponin I: 0.023  within normal limits  [HL]   1040 Potassium(!): 2.9  No EKG changes.  Will replace [HL]   1041 NT-proBNP(!): 8320  Not overtly overloaded on exam, were, however with hypertension and left-sided chest pain do feel that he would benefit from hospitalization at this time for ongoing evaluation with echo and close cardiac monitoring. [HL]   1042 X-Ray Chest AP Portable  Findings suggestive mild interstitial edema [HL]   1308 Troponin I: 0.019  Troponin is flat. [HL]   1309 Magnesium : 1.7  within normal limits  [HL]   1409 Patient was now complaining of pain in the left upper quadrant.  Will proceed with CT imaging to further assess. [HL]   1544 CT Abdomen Pelvis With IV Contrast NO Oral Contrast  Dependent pleural effusions and bibasilar airspace opacity/atelectasis [HL]   1609 BP(!): 205/88  Additional Lasix and hydralazine ordered [HL]      ED Course User Index  [HL] Jessika Lanier,      Patient admitted to [facility] in fair condition.  Blood pressure seemingly controlled with multiple doses of IV medication.  He was urinated quite a bit here, unfortunately it was not measurable as he was had multiple episodes throughout the room despite placement of condom catheter.  He was remained on room air throughout.      FINAL IMPRESSION    Final diagnoses:  [R07.9] Chest pain  [I50.9] Acute on chronic congestive heart failure, unspecified heart failure type (Primary)       DISPOSITION  Patient transfer in stable condition             DISCLAIMER: This note was prepared with [employer] voice recognition transcription software. Garbled syntax, mangled pronouns, and other bizarre constructions may be attributed to that  software system.             Jessika Lanier,   11/17/24 0200

## 2024-11-18 PROBLEM — R79.89 ELEVATED TROPONIN: Status: ACTIVE | Noted: 2024-11-18

## 2024-11-18 LAB
ANION GAP SERPL CALC-SCNC: 12 MMOL/L (ref 8–16)
APICAL FOUR CHAMBER EJECTION FRACTION: 71 %
APICAL TWO CHAMBER EJECTION FRACTION: 65 %
ASCENDING AORTA: 3.21 CM
AV INDEX (PROSTH): 0.75
AV MEAN GRADIENT: 9.4 MMHG
AV PEAK GRADIENT: 13 MMHG
AV REGURGITATION PRESSURE HALF TIME: 349.45 MS
AV VALVE AREA BY VELOCITY RATIO: 4.8 CM²
AV VALVE AREA: 4.9 CM²
AV VELOCITY RATIO: 0.72
BSA FOR ECHO PROCEDURE: 1.85 M2
BUN SERPL-MCNC: 16 MG/DL (ref 8–23)
CALCIUM SERPL-MCNC: 8.1 MG/DL (ref 8.7–10.5)
CHLORIDE SERPL-SCNC: 105 MMOL/L (ref 95–110)
CO2 SERPL-SCNC: 26 MMOL/L (ref 23–29)
CREAT SERPL-MCNC: 1.2 MG/DL (ref 0.5–1.4)
CV ECHO LV RWT: 0.73 CM
DOP CALC AO PEAK VEL: 1.8 M/S
DOP CALC AO VTI: 41.9 CM
DOP CALC LVOT AREA: 6.6 CM2
DOP CALC LVOT DIAMETER: 2.9 CM
DOP CALC LVOT PEAK VEL: 1.3 M/S
DOP CALC LVOT STROKE VOLUME: 207.3 CM3
DOP CALC MV VTI: 31.6 CM
DOP CALCLVOT PEAK VEL VTI: 31.4 CM
E WAVE DECELERATION TIME: 254.79 MSEC
E/A RATIO: 1.33
E/E' RATIO: 16.83 M/S
ECHO LV POSTERIOR WALL: 1.8 CM (ref 0.6–1.1)
EST. GFR  (NO RACE VARIABLE): >60 ML/MIN/1.73 M^2
FRACTIONAL SHORTENING: 32.7 % (ref 28–44)
GLUCOSE SERPL-MCNC: 94 MG/DL (ref 70–110)
INFLUENZA A, MOLECULAR: NOT DETECTED
INFLUENZA B, MOLECULAR: NOT DETECTED
INTERVENTRICULAR SEPTUM: 1.7 CM (ref 0.6–1.1)
IVC DIAMETER: 1.29 CM
LEFT ATRIUM AREA SYSTOLIC (APICAL 2 CHAMBER): 33.69 CM2
LEFT ATRIUM AREA SYSTOLIC (APICAL 4 CHAMBER): 36.09 CM2
LEFT ATRIUM VOLUME INDEX MOD: 70.6 ML/M2
LEFT ATRIUM VOLUME MOD: 131.3 ML
LEFT INTERNAL DIMENSION IN SYSTOLE: 3.3 CM (ref 2.1–4)
LEFT VENTRICLE DIASTOLIC VOLUME INDEX: 60.84 ML/M2
LEFT VENTRICLE DIASTOLIC VOLUME: 113.17 ML
LEFT VENTRICLE END DIASTOLIC VOLUME APICAL 2 CHAMBER: 69.2 ML
LEFT VENTRICLE END DIASTOLIC VOLUME APICAL 4 CHAMBER: 60.98 ML
LEFT VENTRICLE END SYSTOLIC VOLUME APICAL 2 CHAMBER: 126.41 ML
LEFT VENTRICLE END SYSTOLIC VOLUME APICAL 4 CHAMBER: 124 ML
LEFT VENTRICLE MASS INDEX: 212.8 G/M2
LEFT VENTRICLE SYSTOLIC VOLUME INDEX: 23.7 ML/M2
LEFT VENTRICLE SYSTOLIC VOLUME: 44.06 ML
LEFT VENTRICULAR INTERNAL DIMENSION IN DIASTOLE: 4.9 CM (ref 3.5–6)
LEFT VENTRICULAR MASS: 395.8 G
LV LATERAL E/E' RATIO: 16.83 M/S
LV SEPTAL E/E' RATIO: 16.83 M/S
LVED V (TEICH): 113.17 ML
LVES V (TEICH): 44.06 ML
LVOT MG: 4.55 MMHG
LVOT MV: 1.03 CM/S
MAGNESIUM SERPL-MCNC: 1.5 MG/DL (ref 1.6–2.6)
MV MEAN GRADIENT: 2 MMHG
MV PEAK A VEL: 0.76 M/S
MV PEAK E VEL: 1.01 M/S
MV PEAK GRADIENT: 4 MMHG
MV VALVE AREA BY CONTINUITY EQUATION: 6.56 CM2
OHS CV RV/LV RATIO: 0.8 CM
OHS LV EJECTION FRACTION SIMPSONS BIPLANE MOD: 64 %
PISA AR MAX VEL: 4.11 M/S
PISA MRMAX VEL: 4.93 M/S
PISA TR MAX VEL: 2.79 M/S
POTASSIUM SERPL-SCNC: 2.5 MMOL/L (ref 3.5–5.1)
PULM VEIN S/D RATIO: 1.62
PV MV: 0.66 M/S
PV PEAK D VEL: 0.42 M/S
PV PEAK GRADIENT: 3 MMHG
PV PEAK S VEL: 0.68 M/S
PV PEAK VELOCITY: 0.91 M/S
RA PRESSURE ESTIMATED: 3 MMHG
RA VOL SYS: 68.34 ML
RIGHT ATRIAL AREA: 22 CM2
RIGHT ATRIUM VOLUME AREA LENGTH APICAL 4 CHAMBER: 65.33 ML
RIGHT VENTRICLE DIASTOLIC BASEL DIMENSION: 3.9 CM
RSV AG BY MOLECULAR METHOD: NOT DETECTED
RV TB RVSP: 6 MMHG
RV TISSUE DOPPLER FREE WALL SYSTOLIC VELOCITY 1 (APICAL 4 CHAMBER VIEW): 12.41 CM/S
SARS-COV-2 RNA RESP QL NAA+PROBE: NOT DETECTED
SINUS: 3.64 CM
SODIUM SERPL-SCNC: 143 MMOL/L (ref 136–145)
STJ: 2.52 CM
TDI LATERAL: 0.06 M/S
TDI SEPTAL: 0.06 M/S
TDI: 0.06 M/S
TR MAX PG: 31 MMHG
TRICUSPID ANNULAR PLANE SYSTOLIC EXCURSION: 2.19 CM
TROPONIN I SERPL DL<=0.01 NG/ML-MCNC: 0.05 NG/ML (ref 0–0.03)
TV REST PULMONARY ARTERY PRESSURE: 34 MMHG
Z-SCORE OF LEFT VENTRICULAR DIMENSION IN END DIASTOLE: -0.55
Z-SCORE OF LEFT VENTRICULAR DIMENSION IN END SYSTOLE: 0.26

## 2024-11-18 PROCEDURE — 36415 COLL VENOUS BLD VENIPUNCTURE: CPT | Performed by: INTERNAL MEDICINE

## 2024-11-18 PROCEDURE — 63600175 PHARM REV CODE 636 W HCPCS: Performed by: FAMILY MEDICINE

## 2024-11-18 PROCEDURE — 63600175 PHARM REV CODE 636 W HCPCS: Performed by: INTERNAL MEDICINE

## 2024-11-18 PROCEDURE — 99223 1ST HOSP IP/OBS HIGH 75: CPT | Mod: ,,, | Performed by: NURSE PRACTITIONER

## 2024-11-18 PROCEDURE — 11000001 HC ACUTE MED/SURG PRIVATE ROOM

## 2024-11-18 PROCEDURE — 63700000 PHARM REV CODE 250 ALT 637 W/O HCPCS: Performed by: INTERNAL MEDICINE

## 2024-11-18 PROCEDURE — 25000003 PHARM REV CODE 250: Performed by: INTERNAL MEDICINE

## 2024-11-18 PROCEDURE — 83735 ASSAY OF MAGNESIUM: CPT | Performed by: INTERNAL MEDICINE

## 2024-11-18 PROCEDURE — 25000003 PHARM REV CODE 250: Performed by: NURSE PRACTITIONER

## 2024-11-18 PROCEDURE — 80048 BASIC METABOLIC PNL TOTAL CA: CPT | Performed by: INTERNAL MEDICINE

## 2024-11-18 PROCEDURE — 84484 ASSAY OF TROPONIN QUANT: CPT | Performed by: INTERNAL MEDICINE

## 2024-11-18 RX ORDER — POTASSIUM CHLORIDE 20 MEQ/1
40 TABLET, EXTENDED RELEASE ORAL
Status: COMPLETED | OUTPATIENT
Start: 2024-11-18 | End: 2024-11-18

## 2024-11-18 RX ORDER — FUROSEMIDE 10 MG/ML
20 INJECTION INTRAMUSCULAR; INTRAVENOUS EVERY 12 HOURS
Status: DISCONTINUED | OUTPATIENT
Start: 2024-11-18 | End: 2024-11-19

## 2024-11-18 RX ADMIN — FUROSEMIDE 20 MG: 10 INJECTION, SOLUTION INTRAMUSCULAR; INTRAVENOUS at 09:11

## 2024-11-18 RX ADMIN — AMLODIPINE BESYLATE 5 MG: 5 TABLET ORAL at 09:11

## 2024-11-18 RX ADMIN — CETIRIZINE HYDROCHLORIDE 10 MG: 10 TABLET, FILM COATED ORAL at 09:11

## 2024-11-18 RX ADMIN — AZITHROMYCIN DIHYDRATE 500 MG: 250 TABLET ORAL at 09:11

## 2024-11-18 RX ADMIN — CARVEDILOL 25 MG: 25 TABLET, FILM COATED ORAL at 09:11

## 2024-11-18 RX ADMIN — CARVEDILOL 25 MG: 25 TABLET, FILM COATED ORAL at 08:11

## 2024-11-18 RX ADMIN — MAGNESIUM OXIDE TAB 400 MG (241.3 MG ELEMENTAL MG) 400 MG: 400 (241.3 MG) TAB at 09:11

## 2024-11-18 RX ADMIN — POTASSIUM CHLORIDE 40 MEQ: 1500 TABLET, EXTENDED RELEASE ORAL at 12:11

## 2024-11-18 RX ADMIN — POTASSIUM CHLORIDE 10 MEQ: 750 TABLET, EXTENDED RELEASE ORAL at 09:11

## 2024-11-18 RX ADMIN — ENOXAPARIN SODIUM 40 MG: 40 INJECTION SUBCUTANEOUS at 04:11

## 2024-11-18 RX ADMIN — ATORVASTATIN CALCIUM 40 MG: 40 TABLET, FILM COATED ORAL at 08:11

## 2024-11-18 RX ADMIN — POTASSIUM CHLORIDE 40 MEQ: 1500 TABLET, EXTENDED RELEASE ORAL at 10:11

## 2024-11-18 RX ADMIN — SODIUM BICARBONATE 650 MG TABLET 650 MG: at 08:11

## 2024-11-18 RX ADMIN — CEFTRIAXONE SODIUM 1 G: 1 INJECTION, POWDER, FOR SOLUTION INTRAMUSCULAR; INTRAVENOUS at 08:11

## 2024-11-18 RX ADMIN — SODIUM BICARBONATE 650 MG TABLET 650 MG: at 09:11

## 2024-11-18 RX ADMIN — Medication 324 MG: at 07:11

## 2024-11-18 RX ADMIN — FUROSEMIDE 20 MG: 10 INJECTION, SOLUTION INTRAMUSCULAR; INTRAVENOUS at 08:11

## 2024-11-18 NOTE — SUBJECTIVE & OBJECTIVE
Past Medical History:   Diagnosis Date    Arthritis     Hypertension     Renal disorder     Stroke        History reviewed. No pertinent surgical history.    Review of patient's allergies indicates:   Allergen Reactions    Nsaids (non-steroidal anti-inflammatory drug)        No current facility-administered medications on file prior to encounter.     Current Outpatient Medications on File Prior to Encounter   Medication Sig    amLODIPine (NORVASC) 5 MG tablet Take 5 mg by mouth.    atorvastatin (LIPITOR) 40 MG tablet Take 40 mg by mouth.    carvediloL (COREG) 25 MG tablet Take 25 mg by mouth 2 (two) times daily.    ferrous gluconate (FERGON) 324 MG tablet Take 1 tablet by mouth every morning.    fexofenadine (ALLEGRA) 180 MG tablet Take 1 tablet (180 mg total) by mouth once daily.    magnesium oxide (MAG-OX) 400 mg (241.3 mg magnesium) tablet Take 1 tablet by mouth once daily.    nitroGLYCERIN (NITROSTAT) 0.4 MG SL tablet Place 0.4 mg under the tongue.    potassium chloride (KLOR-CON) 10 MEQ TbSR Take 10 mEq by mouth.    ranolazine (RANEXA) 1,000 mg Tb12 Take 1,000 mg by mouth 2 (two) times daily.    sodium bicarbonate 650 MG tablet Take 650 mg by mouth 2 (two) times daily.     Family History       Problem Relation (Age of Onset)    COPD Father    Diabetes Brother, Daughter, Son    Hyperlipidemia Daughter    Hypertension Mother, Brother, Daughter, Son    Lupus Daughter    Lymphoma Daughter    No Known Problems Sister, Brother          Tobacco Use    Smoking status: Former     Current packs/day: 0.00     Types: Cigarettes     Quit date: 1994     Years since quittin.4     Passive exposure: Never    Smokeless tobacco: Never   Substance and Sexual Activity    Alcohol use: Not Currently    Drug use: Not Currently    Sexual activity: Not Currently     Review of Systems   Constitutional:  Positive for fatigue and fever.   HENT:  Positive for congestion. Negative for ear discharge, ear pain and facial swelling.     Eyes: Negative.    Respiratory:  Positive for cough and shortness of breath. Negative for wheezing.    Cardiovascular:  Positive for chest pain. Negative for palpitations and leg swelling.   Gastrointestinal: Negative.    Endocrine: Negative.    Genitourinary: Negative.    Musculoskeletal: Negative.    Neurological: Negative.    Hematological: Negative.    Psychiatric/Behavioral: Negative.       Objective:     Vital Signs (Most Recent):  Temp: 100.1 °F (37.8 °C) (11/17/24 1745)  Pulse: 85 (11/17/24 1729)  Resp: 20 (11/17/24 1729)  BP: (!) 184/84 (11/17/24 1729)  SpO2: (!) 94 % (11/17/24 1729) Vital Signs (24h Range):  Temp:  [97.7 °F (36.5 °C)-101.3 °F (38.5 °C)] 100.1 °F (37.8 °C)  Pulse:  [66-86] 85  Resp:  [18-28] 20  SpO2:  [94 %-100 %] 94 %  BP: (180-240)/() 184/84     Weight: 69.1 kg (152 lb 5.4 oz)  Body mass index is 21.86 kg/m².     Physical Exam  Constitutional:       Appearance: Normal appearance.   HENT:      Head: Normocephalic and atraumatic.      Nose: Nose normal.      Mouth/Throat:      Mouth: Mucous membranes are moist.      Pharynx: Oropharynx is clear. No oropharyngeal exudate.   Eyes:      Extraocular Movements: Extraocular movements intact.      Conjunctiva/sclera: Conjunctivae normal.      Pupils: Pupils are equal, round, and reactive to light.   Cardiovascular:      Rate and Rhythm: Normal rate and regular rhythm.      Pulses: Normal pulses.      Heart sounds: Normal heart sounds. No murmur heard.     No friction rub. No gallop.   Pulmonary:      Effort: Pulmonary effort is normal. No respiratory distress (But decreased at the bases).      Breath sounds: Normal breath sounds.   Abdominal:      General: Abdomen is flat. Bowel sounds are normal.      Palpations: Abdomen is soft. There is no mass.      Tenderness: There is no abdominal tenderness.      Hernia: No hernia is present.   Musculoskeletal:         General: Normal range of motion.      Cervical back: Normal range of motion and  neck supple.   Skin:     General: Skin is warm and dry.   Neurological:      General: No focal deficit present.      Mental Status: He is alert and oriented to person, place, and time. Mental status is at baseline.              CRANIAL NERVES     CN III, IV, VI   Pupils are equal, round, and reactive to light.       Significant Labs: All pertinent labs within the past 24 hours have been reviewed.  BMP:   Recent Labs   Lab 11/17/24  0944   *      K 2.9*      CO2 29   BUN 15   CREATININE 0.85   CALCIUM 8.0*   MG 1.7     CBC:   Recent Labs   Lab 11/17/24  0944   WBC 5.97   HGB 8.6*   HCT 26.7*        CMP:   Recent Labs   Lab 11/17/24  0944      K 2.9*      CO2 29   *   BUN 15   CREATININE 0.85   CALCIUM 8.0*   PROT 6.8   ALBUMIN 3.4*   BILITOT 0.5   ALKPHOS 60   AST 22   ALT 14   ANIONGAP 7*     Magnesium:   Recent Labs   Lab 11/17/24  0944   MG 1.7     Troponin:   Recent Labs   Lab 11/17/24  0944 11/17/24  1227   TROPONINI 0.023 0.019       Significant Imaging: I have reviewed all pertinent imaging results/findings within the past 24 hours.  I have reviewed and interpreted all pertinent imaging results/findings within the past 24 hours.

## 2024-11-18 NOTE — ASSESSMENT & PLAN NOTE
Creatine stable for now. BMP reviewed- noted Estimated Creatinine Clearance: 46.4 mL/min (based on SCr of 1.2 mg/dL). according to latest data. Based on current GFR, CKD stage is stage 3 - GFR 30-59.  Monitor UOP and serial BMP and adjust therapy as needed. Renally dose meds. Avoid nephrotoxic medications and procedures.

## 2024-11-18 NOTE — H&P
"  [facility] Medicine  History & Physical    Patient Name: Stanley Granados  MRN: [id number]  Patient Class: OP- Observation  Admission Date: 11/17/2024  Attending Physician: {ATTENDING PROVIDER:[id number]}  Primary Care Provider: Phuong Umaña MD         Patient information was obtained from {info source:[id number]::"ER records"}.     Subjective:     Principal Problem:<principal problem not specified>    Chief Complaint:   Chief Complaint   Patient presents with    Chest Pain     Left side chest pain that radiates down the left arm. Pain started this morning.         HPI: 84 y.o. male with a past medical history of Arthritis, Hypertension, Renal disorder, and Stroke transferred from [facility] for .   left-sided chest pain that started this morning accompanied by cough and shortness of breath x 2 days duration. CP is non radiating. He states he ran out of his blood pressure pills and has not been taking them. BP was 201/85 on presentation.  After arriving here from [facility], he had fever up to 102.1. No sick contacts.   He does have left-sided residual deficits from a previous stroke.  No leg swelling.  No nausea vomiting or diarrhea.  Trop x 1 - neg.  CXR showed bibasilar airspace disease.    Past Medical History:   Diagnosis Date    Arthritis     Hypertension     Renal disorder     Stroke        History reviewed. No pertinent surgical history.    Review of patient's allergies indicates:   Allergen Reactions    Nsaids (non-steroidal anti-inflammatory drug)        No current facility-administered medications on file prior to encounter.     Current Outpatient Medications on File Prior to Encounter   Medication Sig    amLODIPine (NORVASC) 5 MG tablet Take 5 mg by mouth.    atorvastatin (LIPITOR) 40 MG tablet Take 40 mg by mouth.    carvediloL (COREG) 25 MG tablet Take 25 mg by mouth 2 (two) times daily.    ferrous gluconate (FERGON) 324 MG tablet Take 1 tablet by mouth every morning.    fexofenadine (ALLEGRA) 180 MG tablet " Take 1 tablet (180 mg total) by mouth once daily.    magnesium oxide (MAG-OX) 400 mg (241.3 mg magnesium) tablet Take 1 tablet by mouth once daily.    nitroGLYCERIN (NITROSTAT) 0.4 MG SL tablet Place 0.4 mg under the tongue.    potassium chloride (KLOR-CON) 10 MEQ TbSR Take 10 mEq by mouth.    ranolazine (RANEXA) 1,000 mg Tb12 Take 1,000 mg by mouth 2 (two) times daily.    sodium bicarbonate 650 MG tablet Take 650 mg by mouth 2 (two) times daily.     Family History       Problem Relation (Age of Onset)    COPD Father    Diabetes Brother, Daughter, Son    Hyperlipidemia Daughter    Hypertension Mother, Brother, Daughter, Son    Lupus Daughter    Lymphoma Daughter    No Known Problems Sister, Brother          Tobacco Use    Smoking status: Former     Current packs/day: 0.00     Types: Cigarettes     Quit date: 1994     Years since quittin.4     Passive exposure: Never    Smokeless tobacco: Never   Substance and Sexual Activity    Alcohol use: Not Currently    Drug use: Not Currently    Sexual activity: Not Currently     Review of Systems   Constitutional:  Positive for fatigue and fever.   HENT:  Positive for congestion. Negative for ear discharge, ear pain and facial swelling.    Eyes: Negative.    Respiratory:  Positive for cough and shortness of breath. Negative for wheezing.    Cardiovascular:  Positive for chest pain. Negative for palpitations and leg swelling.   Gastrointestinal: Negative.    Endocrine: Negative.    Genitourinary: Negative.    Musculoskeletal: Negative.    Neurological: Negative.    Hematological: Negative.    Psychiatric/Behavioral: Negative.       Objective:     Vital Signs (Most Recent):  Temp: 100.1 °F (37.8 °C) (24)  Pulse: 85 (24)  Resp: 20 (24)  BP: (!) 184/84 (24)  SpO2: (!) 94 % (24) Vital Signs (24h Range):  Temp:  [97.7 °F (36.5 °C)-101.3 °F (38.5 °C)] 100.1 °F (37.8 °C)  Pulse:  [66-86] 85  Resp:  [18-28] 20  SpO2:  [94  %-100 %] 94 %  BP: (180-240)/() 184/84     Weight: 69.1 kg (152 lb 5.4 oz)  Body mass index is 21.86 kg/m².     Physical Exam  Constitutional:       Appearance: Normal appearance.   HENT:      Head: Normocephalic and atraumatic.      Nose: Nose normal.      Mouth/Throat:      Mouth: Mucous membranes are moist.      Pharynx: Oropharynx is clear. No oropharyngeal exudate.   Eyes:      Extraocular Movements: Extraocular movements intact.      Conjunctiva/sclera: Conjunctivae normal.      Pupils: Pupils are equal, round, and reactive to light.   Cardiovascular:      Rate and Rhythm: Normal rate and regular rhythm.      Pulses: Normal pulses.      Heart sounds: Normal heart sounds. No murmur heard.     No friction rub. No gallop.   Pulmonary:      Effort: Pulmonary effort is normal. No respiratory distress (But decreased at the bases).      Breath sounds: Normal breath sounds.   Abdominal:      General: Abdomen is flat. Bowel sounds are normal.      Palpations: Abdomen is soft. There is no mass.      Tenderness: There is no abdominal tenderness.      Hernia: No hernia is present.   Musculoskeletal:         General: Normal range of motion.      Cervical back: Normal range of motion and neck supple.   Skin:     General: Skin is warm and dry.   Neurological:      General: No focal deficit present.      Mental Status: He is alert and oriented to person, place, and time. Mental status is at baseline.              CRANIAL NERVES     CN III, IV, VI   Pupils are equal, round, and reactive to light.       Significant Labs: All pertinent labs within the past 24 hours have been reviewed.  BMP:   Recent Labs   Lab 11/17/24  0944   *      K 2.9*      CO2 29   BUN 15   CREATININE 0.85   CALCIUM 8.0*   MG 1.7     CBC:   Recent Labs   Lab 11/17/24  0944   WBC 5.97   HGB 8.6*   HCT 26.7*        CMP:   Recent Labs   Lab 11/17/24  0944      K 2.9*      CO2 29   *   BUN 15   CREATININE 0.85    CALCIUM 8.0*   PROT 6.8   ALBUMIN 3.4*   BILITOT 0.5   ALKPHOS 60   AST 22   ALT 14   ANIONGAP 7*     Magnesium:   Recent Labs   Lab 11/17/24  0944   MG 1.7     Troponin:   Recent Labs   Lab 11/17/24  0944 11/17/24  1227   TROPONINI 0.023 0.019       Significant Imaging: I have reviewed all pertinent imaging results/findings within the past 24 hours.  I have reviewed and interpreted all pertinent imaging results/findings within the past 24 hours.  Assessment/Plan:     Fever        Hypertension  Patients blood pressure range in the last 24 hours was: BP  Min: 180/80  Max: 240/111.The patient's inpatient anti-hypertensive regimen is listed below:  Current Antihypertensives  amLODIPine tablet 5 mg, Daily, Oral  carvediloL tablet 25 mg, 2 times daily, Oral  furosemide injection 40 mg, Every 12 hours, Intravenous  hydrALAZINE injection 10 mg, Every 4 hours PRN, Intravenous    Plan  - BP is uncontrolled, will adjust as follows:  Resume home blood pressure medicines  -  plans as above      VTE Risk Mitigation (From admission, onward)           Ordered     enoxaparin injection 40 mg  Daily         11/17/24 1812     IP VTE HIGH RISK PATIENT  Once         11/17/24 1812     Place sequential compression device  Until discontinued         11/17/24 1812                       On 11/17/2024, patient should be placed in hospital observation services under my care.             Maria D Ruiz MD, MD  Department of Hospital Medicine  [place] [facility]

## 2024-11-18 NOTE — ASSESSMENT & PLAN NOTE
Patients blood pressure range in the last 24 hours was: BP  Min: 121/60  Max: 240/111.The patient's inpatient anti-hypertensive regimen is listed below:  Current Antihypertensives  amLODIPine tablet 5 mg, Daily, Oral  carvediloL tablet 25 mg, 2 times daily, Oral  hydrALAZINE injection 10 mg, Every 4 hours PRN, Intravenous  furosemide injection 20 mg, Every 12 hours, Intravenous    Plan  - BP is uncontrolled, will adjust as follows:  Resume home blood pressure medicines  -  plans as above

## 2024-11-18 NOTE — SUBJECTIVE & OBJECTIVE
Interval History:  Awake alert, sitting up in bed and eating  Updated patient and daughter by bedside  Deescalate IV Lasix dosage   Replace K  TTE pending      Review of Systems   Constitutional:  Positive for fatigue. Negative for fever.   HENT:  Negative for congestion.    Respiratory:  Negative for cough, shortness of breath and wheezing.    Cardiovascular:  Negative for chest pain, palpitations and leg swelling.     Objective:     Vital Signs (Most Recent):  Temp: 99.1 °F (37.3 °C) (11/18/24 0731)  Pulse: 79 (11/18/24 0731)  Resp: 18 (11/18/24 0731)  BP: (!) 144/66 (11/18/24 0731)  SpO2: 95 % (11/18/24 0731) Vital Signs (24h Range):  Temp:  [97.7 °F (36.5 °C)-101.3 °F (38.5 °C)] 99.1 °F (37.3 °C)  Pulse:  [62-86] 79  Resp:  [17-28] 18  SpO2:  [93 %-100 %] 95 %  BP: (121-240)/() 144/66     Weight: 69.1 kg (152 lb 5.4 oz)  Body mass index is 21.86 kg/m².    Intake/Output Summary (Last 24 hours) at 11/18/2024 0822  Last data filed at 11/18/2024 0525  Gross per 24 hour   Intake --   Output 1400 ml   Net -1400 ml         Physical Exam  Constitutional:       Appearance: Normal appearance.   HENT:      Head: Normocephalic and atraumatic.   Cardiovascular:      Rate and Rhythm: Normal rate and regular rhythm.      Pulses: Normal pulses.      Heart sounds: Normal heart sounds. No murmur heard.     No friction rub. No gallop.   Pulmonary:      Effort: Pulmonary effort is normal. No respiratory distress (But decreased at the bases).      Breath sounds: Normal breath sounds.   Abdominal:      General: Abdomen is flat. Bowel sounds are normal.      Palpations: Abdomen is soft. There is no mass.      Tenderness: There is no abdominal tenderness.      Hernia: No hernia is present.   Musculoskeletal:         General: Normal range of motion.      Cervical back: Normal range of motion and neck supple.   Skin:     General: Skin is warm and dry.   Neurological:      General: No focal deficit present.      Mental Status: He is  "alert and oriented to person, place, and time. Mental status is at baseline.             Significant Labs: A1C: No results for input(s): "HGBA1C" in the last 4320 hours.  ABGs: No results for input(s): "PH", "PCO2", "HCO3", "POCSATURATED", "BE", "TOTALHB", "COHB", "METHB", "O2HB", "POCFIO2", "PO2" in the last 48 hours.  Blood Culture:   Recent Labs   Lab 11/17/24 1957   LABBLOO No Growth to date     CBC:   Recent Labs   Lab 11/17/24 0944   WBC 5.97   HGB 8.6*   HCT 26.7*        CMP:   Recent Labs   Lab 11/17/24 0944 11/18/24  0259    143   K 2.9* 2.5*    105   CO2 29 26   * 94   BUN 15 16   CREATININE 0.85 1.2   CALCIUM 8.0* 8.1*   PROT 6.8  --    ALBUMIN 3.4*  --    BILITOT 0.5  --    ALKPHOS 60  --    AST 22  --    ALT 14  --    ANIONGAP 7* 12     Lactic Acid: No results for input(s): "LACTATE" in the last 48 hours.  Lipase: No results for input(s): "LIPASE" in the last 48 hours.  Lipid Panel: No results for input(s): "CHOL", "HDL", "LDLCALC", "TRIG", "CHOLHDL" in the last 48 hours.  Magnesium:   Recent Labs   Lab 11/17/24  0944 11/18/24  0259   MG 1.7 1.5*     Troponin:   Recent Labs   Lab 11/17/24  1227 11/17/24 1958 11/18/24  0008   TROPONINI 0.019 0.043* 0.054*     TSH: No results for input(s): "TSH" in the last 4320 hours.  Urine Culture: No results for input(s): "LABURIN" in the last 48 hours.  Urine Studies:   Recent Labs   Lab 11/17/24  1823   COLORU Colorless*   APPEARANCEUA Clear   PHUR 7.0   SPECGRAV 1.015   PROTEINUA Negative   GLUCUA Negative   KETONESU Negative   BILIRUBINUA Negative   OCCULTUA Negative   NITRITE Negative   UROBILINOGEN Negative   LEUKOCYTESUR Negative       Significant Imaging: I have reviewed all pertinent imaging results/findings within the past 24 hours.  "

## 2024-11-18 NOTE — HOSPITAL COURSE
11/18/2024 per HPI   11/19/2024 Lasix down titrated to 20mg IV BID with 800cc out  overnight. SBP down to 130s-150s Creatinine 1.3 K+ 3.3 Mg 1.8- replacement ordered

## 2024-11-18 NOTE — ASSESSMENT & PLAN NOTE
"Patient has  heart failure that is . On presentation their CHF was . Most recent BNP and echo results are listed below.  No results for input(s): "BNP" in the last 72 hours.  Latest ECHO  No results found for this or any previous visit.    Current Heart Failure Medications  carvediloL tablet 25 mg, 2 times daily, Oral  hydrALAZINE injection 10 mg, Every 4 hours PRN, Intravenous  furosemide injection 20 mg, Every 12 hours, Intravenous    Plan  - Monitor strict I&Os and daily weights.    - Place on telemetry  - Low sodium diet  - Place on fluid restriction of .   - Cardiology  consulted-appreciate recs  - The patient's volume status is   - TTE pending      "

## 2024-11-18 NOTE — ASSESSMENT & PLAN NOTE
- presented with chest pain and SOB in setting of PNA, fever and uncontrolled HTN  - troponin 0.019-0.043-0.054; EKG with non specific STTWC; will repeat troponin this afternoon but feel flat/plateau  - history of CAD with RCA  with collaterals from [facility] in 2014; followed by [facility] on statin, BB and Ranexa as an outpatient- no ASA for unknown reasons  - feel troponin elevation related to demand etiology in setting of uncontrolled HTN and PNA at this time; no concern for ACS; echo pending for evaluation of LVEF and for WMA- no plans for further cardiac workup as of now

## 2024-11-18 NOTE — SUBJECTIVE & OBJECTIVE
Past Medical History:   Diagnosis Date    Arthritis     Hypertension     Renal disorder     Stroke        History reviewed. No pertinent surgical history.    Review of patient's allergies indicates:   Allergen Reactions    Nsaids (non-steroidal anti-inflammatory drug)        No current facility-administered medications on file prior to encounter.     Current Outpatient Medications on File Prior to Encounter   Medication Sig    amLODIPine (NORVASC) 5 MG tablet Take 5 mg by mouth.    atorvastatin (LIPITOR) 40 MG tablet Take 40 mg by mouth.    carvediloL (COREG) 25 MG tablet Take 25 mg by mouth 2 (two) times daily.    ferrous gluconate (FERGON) 324 MG tablet Take 1 tablet by mouth every morning.    fexofenadine (ALLEGRA) 180 MG tablet Take 1 tablet (180 mg total) by mouth once daily.    magnesium oxide (MAG-OX) 400 mg (241.3 mg magnesium) tablet Take 1 tablet by mouth once daily.    nitroGLYCERIN (NITROSTAT) 0.4 MG SL tablet Place 0.4 mg under the tongue.    potassium chloride (KLOR-CON) 10 MEQ TbSR Take 10 mEq by mouth.    ranolazine (RANEXA) 1,000 mg Tb12 Take 1,000 mg by mouth 2 (two) times daily.    sodium bicarbonate 650 MG tablet Take 650 mg by mouth 2 (two) times daily.     Family History       Problem Relation (Age of Onset)    COPD Father    Diabetes Brother, Daughter, Son    Hyperlipidemia Daughter    Hypertension Mother, Brother, Daughter, Son    Lupus Daughter    Lymphoma Daughter    No Known Problems Sister, Brother          Tobacco Use    Smoking status: Former     Current packs/day: 0.00     Types: Cigarettes     Quit date: 1994     Years since quittin.4     Passive exposure: Never    Smokeless tobacco: Never   Substance and Sexual Activity    Alcohol use: Not Currently    Drug use: Not Currently    Sexual activity: Not Currently     Review of Systems   Constitutional: Negative for chills, decreased appetite, diaphoresis, fever, malaise/fatigue, weight gain and weight loss.   Cardiovascular:   Positive for chest pain and dyspnea on exertion. Negative for claudication, irregular heartbeat, leg swelling, near-syncope, orthopnea, palpitations and paroxysmal nocturnal dyspnea.   Respiratory:  Negative for cough, shortness of breath, snoring, sputum production and wheezing.    Endocrine: Negative for cold intolerance, heat intolerance, polydipsia, polyphagia and polyuria.   Skin:  Negative for color change, dry skin, itching, nail changes and poor wound healing.   Musculoskeletal:  Negative for back pain, gout, joint pain and joint swelling.   Gastrointestinal:  Negative for bloating, abdominal pain, constipation, diarrhea, hematemesis, hematochezia, melena, nausea and vomiting.   Genitourinary:  Negative for dysuria, hematuria and nocturia.   Neurological:  Negative for dizziness, headaches, light-headedness, numbness, paresthesias and weakness.   Psychiatric/Behavioral:  Negative for altered mental status, depression and memory loss.      Objective:     Vital Signs (Most Recent):  Temp: 99.1 °F (37.3 °C) (11/18/24 0731)  Pulse: 79 (11/18/24 0731)  Resp: 18 (11/18/24 0731)  BP: (!) 144/66 (11/18/24 0731)  SpO2: 95 % (11/18/24 0731) Vital Signs (24h Range):  Temp:  [97.8 °F (36.6 °C)-101.3 °F (38.5 °C)] 99.1 °F (37.3 °C)  Pulse:  [62-86] 79  Resp:  [17-28] 18  SpO2:  [93 %-100 %] 95 %  BP: (121-240)/() 144/66     Weight: 69.1 kg (152 lb 5.4 oz)  Body mass index is 21.86 kg/m².    SpO2: 95 %         Intake/Output Summary (Last 24 hours) at 11/18/2024 1100  Last data filed at 11/18/2024 0945  Gross per 24 hour   Intake 120 ml   Output 1400 ml   Net -1280 ml       Lines/Drains/Airways       Peripheral Intravenous Line  Duration                  Peripheral IV - Single Lumen 11/17/24 1000 20 G Right Antecubital 1 day         Peripheral IV - Single Lumen 11/17/24 1127 20 G Anterior;Left Forearm <1 day                     Physical Exam  Constitutional:       General: He is not in acute distress.     Appearance:  He is well-developed.   Cardiovascular:      Rate and Rhythm: Normal rate and regular rhythm.      Heart sounds: No murmur heard.     No gallop.   Pulmonary:      Effort: Pulmonary effort is normal. No respiratory distress.      Breath sounds: Normal breath sounds. No wheezing.   Abdominal:      General: Bowel sounds are normal. There is no distension.      Palpations: Abdomen is soft.      Tenderness: There is no abdominal tenderness.   Skin:     General: Skin is warm and dry.   Neurological:      Mental Status: He is alert and oriented to person, place, and time.          Significant Labs: BMP:   Recent Labs   Lab 11/17/24  0944 11/18/24  0259   * 94    143   K 2.9* 2.5*    105   CO2 29 26   BUN 15 16   CREATININE 0.85 1.2   CALCIUM 8.0* 8.1*   MG 1.7 1.5*   , CBC   Recent Labs   Lab 11/17/24  0944   WBC 5.97   HGB 8.6*   HCT 26.7*      , and Troponin   Recent Labs   Lab 11/17/24  1227 11/17/24  1958 11/18/24  0008   TROPONINI 0.019 0.043* 0.054*       Significant Imaging: Echocardiogram: Transthoracic echo (TTE) complete (Cupid Only): No results found for this or any previous visit.

## 2024-11-18 NOTE — ASSESSMENT & PLAN NOTE
Patients blood pressure range in the last 24 hours was: BP  Min: 180/80  Max: 240/111.The patient's inpatient anti-hypertensive regimen is listed below:  Current Antihypertensives  amLODIPine tablet 5 mg, Daily, Oral  carvediloL tablet 25 mg, 2 times daily, Oral  furosemide injection 40 mg, Every 12 hours, Intravenous  hydrALAZINE injection 10 mg, Every 4 hours PRN, Intravenous    Plan  - BP is uncontrolled, will adjust as follows:  Resume home blood pressure medicines  -  plans as above

## 2024-11-18 NOTE — ASSESSMENT & PLAN NOTE
- history of CAD with RCA  with collateral from [facility] in 2014  - followed by [facility]; on BB, Norvasc, statin and Ranexa as an outpatient  - BP elevated upon admission but better controlled currently; continue home medication regimen; will resume Ranexa

## 2024-11-18 NOTE — ASSESSMENT & PLAN NOTE
Patient has a diagnosis of pneumonia. The cause of the pneumonia is unknown at this time. The pneumonia is stable. The patient has the following signs/symptoms of pneumonia: cough and shortness of breath. The patient does not have a current oxygen requirement and the patient does not have a home oxygen requirement. I have reviewed the pertinent imaging. The following cultures have been collected:  Blood  The culture results are listed below.     Current antimicrobial regimen consists of the antibiotics listed below. Will monitor patient closely and     Antibiotics (From admission, onward)      Start     Stop Route Frequency Ordered    11/18/24 0900  azithromycin tablet 500 mg         11/21/24 0859 Oral Daily 11/17/24 1812 11/17/24 1915  cefTRIAXone injection 1 g         -- IV Every 24 hours (non-standard times) 11/17/24 1812            Microbiology Results (last 7 days)       Procedure Component Value Units Date/Time    Blood culture [[id number]] Collected: 11/17/24 1957    Order Status: Sent Specimen: Blood from Peripheral, Hand, Left

## 2024-11-18 NOTE — PLAN OF CARE
11/17/24 1816   Admission   Initial VN Admission Questions Complete   Communication Issues? Patient Hearing  (False Pass)   Shift   Virtual Nurse - Rounding Complete   Pain Management Interventions care clustered;diversional activity provided;pain management plan reviewed with patient/caregiver;quiet environment facilitated;relaxation techniques promoted   Virtual Nurse - Patient Verbalized Approval Of Camera Use;VN Rounding   Type of Frequent Check   Type Patient Rounds;Telemetry Monitoring   Safety/Activity   Patient Rounds bed in low position;ID band on;placement of personal items at bedside;bed wheels locked;call light in patient/parent reach;visualized patient;clutter free environment maintained   Safety Promotion/Fall Prevention assistive device/personal item within reach;bed alarm set;diversional activities provided;Fall Risk reviewed with patient/family;Fall Risk signage in place;family expresses understanding of fall risk and prevention;high risk medications identified;instructed to call staff for mobility;medications reviewed;patient expresses understanding of fall risk and prevention;side rails raised x 2   Positioning   Body Position position changed independently;supine   Head of Bed (HOB) Positioning HOB elevated   Pain/Comfort/Sleep   Preferred Pain Scale number (Numeric Rating Pain Scale)   Cardiac   Cardiac/Telemetry Monitor On Yes   ECG   Rhythm normal sinus rhythm     VN cued into patient's room for introduction with patient's permission. Daughter Ashley at bedside.  VN role explained and informed patient/family that VN would be working with bedside nurse and rest of care team.  Plan of care reviewed, pt encouraged to refer to whiteboard to determine staff for the day and pt/family educated on VTE,  fall risk and advised to call for assistance at all times to ensure pt's safety. Pt is without any signs of pain, anxiety, dyspnea or nausea. Patient's chart, labs and vital signs reviewed.  Allowed time  for questions.  Will continue to be available as needed.

## 2024-11-18 NOTE — ASSESSMENT & PLAN NOTE
"Patient has  heart failure that is . On presentation their CHF was . Most recent BNP and echo results are listed below.  No results for input(s): "BNP" in the last 72 hours.  Latest ECHO  No results found for this or any previous visit.    Current Heart Failure Medications  carvediloL tablet 25 mg, 2 times daily, Oral  furosemide injection 40 mg, Every 12 hours, Intravenous  hydrALAZINE injection 10 mg, Every 4 hours PRN, Intravenous    Plan  - Monitor strict I&Os and daily weights.    - Place on telemetry  - Low sodium diet  - Place on fluid restriction of .   - Cardiology  consulted  - The patient's volume status is   - ***      "

## 2024-11-18 NOTE — ASSESSMENT & PLAN NOTE
Patient's most recent potassium results are listed below.   Recent Labs     11/17/24  0944 11/18/24  0259   K 2.9* 2.5*       Plan  - Replete potassium per protocol  - Monitor potassium Daily  - Patient's hypokalemia is stable

## 2024-11-18 NOTE — HPI
83yo male with HTN, CHF, fever, hypokalemia, PNA, CKD stage III, CAD (RCA  on [facility] in 2014 with collaterals) and PAD who presented to the ER with complaints of left sided chest pain. He was accompanied by his daughter who helped provide his HPI. He reports left sided chest pain with radiation to his left arm along with cough and SOB for the past 2 days. His daughter reports he would point to the left side of his abdomen when asked about pain PTA. His daughter reported noted his breathing to be heavy and was getting worse so she brought him to the ER at [facility]. BP upon arrival 201/82 temp 102.2. She reports he takes his own medications but some of his meds ran out and they were unable to get them refilled mainly Ranexa and Norvasc. Labs CBC with H&H 8.6&26.8 BMP with K+ 2.52 BUN 16 creatinine 1.2 NT pro BNP 8320 Troponin 0.019-0.043-0.054 EKG with NSR with 1st degree AVB with non specific STTWC unchanged from previous. Admitted to [facility] for PNA treatment as well as BP management. Cardiology consulted du eto elevated troponin

## 2024-11-18 NOTE — ASSESSMENT & PLAN NOTE
Cause of fever is unknown.    We will send blood cultures x2   Sent COVID/flu/RSV testing  Start empiric antibiotics given concern for possible pneumonia as well

## 2024-11-18 NOTE — H&P
[facility]  History & Physical    Patient Name: Stanley Granados  MRN: [id number]  Patient Class: OP- Observation  Admission Date: 11/17/2024  Attending Physician: Maria D Ruiz MD, MD  Primary Care Provider: Phuong Umaña MD         Patient information was obtained from patient, relative(s), and ER records.     Subjective:     Principal Problem:<principal problem not specified>    Chief Complaint:   Chief Complaint   Patient presents with    Chest Pain     Left side chest pain that radiates down the left arm. Pain started this morning.         HPI: 84 y.o. male with a past medical history of Arthritis, Hypertension, Renal disorder, and Stroke transferred from [facility] for .   left-sided chest pain that started this morning accompanied by cough and shortness of breath x 2 days duration. CP is non radiating. He states he ran out of his blood pressure pills and has not been taking them. BP was 201/85 on presentation.  After arriving here from [facility], he had fever up to 102.1. No sick contacts.   He does have left-sided residual deficits from a previous stroke.  No leg swelling.  No nausea vomiting or diarrhea.  Trop x 1 - neg.  CXR showed bibasilar airspace disease.    Past Medical History:   Diagnosis Date    Arthritis     Hypertension     Renal disorder     Stroke        History reviewed. No pertinent surgical history.    Review of patient's allergies indicates:   Allergen Reactions    Nsaids (non-steroidal anti-inflammatory drug)        No current facility-administered medications on file prior to encounter.     Current Outpatient Medications on File Prior to Encounter   Medication Sig    amLODIPine (NORVASC) 5 MG tablet Take 5 mg by mouth.    atorvastatin (LIPITOR) 40 MG tablet Take 40 mg by mouth.    carvediloL (COREG) 25 MG tablet Take 25 mg by mouth 2 (two) times daily.    ferrous gluconate (FERGON) 324 MG tablet Take 1 tablet by mouth every morning.    fexofenadine (ALLEGRA) 180 MG tablet  Take 1 tablet (180 mg total) by mouth once daily.    magnesium oxide (MAG-OX) 400 mg (241.3 mg magnesium) tablet Take 1 tablet by mouth once daily.    nitroGLYCERIN (NITROSTAT) 0.4 MG SL tablet Place 0.4 mg under the tongue.    potassium chloride (KLOR-CON) 10 MEQ TbSR Take 10 mEq by mouth.    ranolazine (RANEXA) 1,000 mg Tb12 Take 1,000 mg by mouth 2 (two) times daily.    sodium bicarbonate 650 MG tablet Take 650 mg by mouth 2 (two) times daily.     Family History       Problem Relation (Age of Onset)    COPD Father    Diabetes Brother, Daughter, Son    Hyperlipidemia Daughter    Hypertension Mother, Brother, Daughter, Son    Lupus Daughter    Lymphoma Daughter    No Known Problems Sister, Brother          Tobacco Use    Smoking status: Former     Current packs/day: 0.00     Types: Cigarettes     Quit date: 1994     Years since quittin.4     Passive exposure: Never    Smokeless tobacco: Never   Substance and Sexual Activity    Alcohol use: Not Currently    Drug use: Not Currently    Sexual activity: Not Currently     Review of Systems   Constitutional:  Positive for fatigue and fever.   HENT:  Positive for congestion. Negative for ear discharge, ear pain and facial swelling.    Eyes: Negative.    Respiratory:  Positive for cough and shortness of breath. Negative for wheezing.    Cardiovascular:  Positive for chest pain. Negative for palpitations and leg swelling.   Gastrointestinal: Negative.    Endocrine: Negative.    Genitourinary: Negative.    Musculoskeletal: Negative.    Neurological: Negative.    Hematological: Negative.    Psychiatric/Behavioral: Negative.       Objective:     Vital Signs (Most Recent):  Temp: 100.1 °F (37.8 °C) (24)  Pulse: 85 (24)  Resp: 20 (24)  BP: (!) 184/84 (24)  SpO2: (!) 94 % (24) Vital Signs (24h Range):  Temp:  [97.7 °F (36.5 °C)-101.3 °F (38.5 °C)] 100.1 °F (37.8 °C)  Pulse:  [66-86] 85  Resp:  [18-28] 20  SpO2:  [94  %-100 %] 94 %  BP: (180-240)/() 184/84     Weight: 69.1 kg (152 lb 5.4 oz)  Body mass index is 21.86 kg/m².     Physical Exam  Constitutional:       Appearance: Normal appearance.   HENT:      Head: Normocephalic and atraumatic.      Nose: Nose normal.      Mouth/Throat:      Mouth: Mucous membranes are moist.      Pharynx: Oropharynx is clear. No oropharyngeal exudate.   Eyes:      Extraocular Movements: Extraocular movements intact.      Conjunctiva/sclera: Conjunctivae normal.      Pupils: Pupils are equal, round, and reactive to light.   Cardiovascular:      Rate and Rhythm: Normal rate and regular rhythm.      Pulses: Normal pulses.      Heart sounds: Normal heart sounds. No murmur heard.     No friction rub. No gallop.   Pulmonary:      Effort: Pulmonary effort is normal. No respiratory distress (But decreased at the bases).      Breath sounds: Normal breath sounds.   Abdominal:      General: Abdomen is flat. Bowel sounds are normal.      Palpations: Abdomen is soft. There is no mass.      Tenderness: There is no abdominal tenderness.      Hernia: No hernia is present.   Musculoskeletal:         General: Normal range of motion.      Cervical back: Normal range of motion and neck supple.   Skin:     General: Skin is warm and dry.   Neurological:      General: No focal deficit present.      Mental Status: He is alert and oriented to person, place, and time. Mental status is at baseline.              CRANIAL NERVES     CN III, IV, VI   Pupils are equal, round, and reactive to light.       Significant Labs: All pertinent labs within the past 24 hours have been reviewed.  BMP:   Recent Labs   Lab 11/17/24  0944   *      K 2.9*      CO2 29   BUN 15   CREATININE 0.85   CALCIUM 8.0*   MG 1.7     CBC:   Recent Labs   Lab 11/17/24  0944   WBC 5.97   HGB 8.6*   HCT 26.7*        CMP:   Recent Labs   Lab 11/17/24  0944      K 2.9*      CO2 29   *   BUN 15   CREATININE 0.85    CALCIUM 8.0*   PROT 6.8   ALBUMIN 3.4*   BILITOT 0.5   ALKPHOS 60   AST 22   ALT 14   ANIONGAP 7*     Magnesium:   Recent Labs   Lab 11/17/24  0944   MG 1.7     Troponin:   Recent Labs   Lab 11/17/24  0944 11/17/24  1227   TROPONINI 0.023 0.019       Significant Imaging: I have reviewed all pertinent imaging results/findings within the past 24 hours.  I have reviewed and interpreted all pertinent imaging results/findings within the past 24 hours.  Assessment/Plan:     Pneumonia of both lungs due to infectious organism  Patient has a diagnosis of pneumonia. The cause of the pneumonia is unknown at this time. The pneumonia is stable. The patient has the following signs/symptoms of pneumonia: cough and shortness of breath. The patient does not have a current oxygen requirement and the patient does not have a home oxygen requirement. I have reviewed the pertinent imaging. The following cultures have been collected:  Blood  The culture results are listed below.     Current antimicrobial regimen consists of the antibiotics listed below. Will monitor patient closely and     Antibiotics (From admission, onward)      Start     Stop Route Frequency Ordered    11/18/24 0900  azithromycin tablet 500 mg         11/21/24 0859 Oral Daily 11/17/24 1812 11/17/24 1915  cefTRIAXone injection 1 g         -- IV Every 24 hours (non-standard times) 11/17/24 1812            Microbiology Results (last 7 days)       Procedure Component Value Units Date/Time    Blood culture [[id number]] Collected: 11/17/24 1957    Order Status: Sent Specimen: Blood from Peripheral, Hand, Left             Hypokalemia  Patient's most recent potassium results are listed below.   Recent Labs     11/17/24  0944   K 2.9*     Plan  - Replete potassium per protocol  - Monitor potassium Daily  - Patient's hypokalemia is stable      Fever    Cause of fever is unknown.    We will send blood cultures x2   Sent COVID/flu/RSV testing  Start empiric antibiotics given  "concern for possible pneumonia as well    Congestive heart failure  Patient has  heart failure that is . On presentation their CHF was . Most recent BNP and echo results are listed below.  No results for input(s): "BNP" in the last 72 hours.  Latest ECHO  No results found for this or any previous visit.    Current Heart Failure Medications  carvediloL tablet 25 mg, 2 times daily, Oral  furosemide injection 40 mg, Every 12 hours, Intravenous  hydrALAZINE injection 10 mg, Every 4 hours PRN, Intravenous    Plan  - Monitor strict I&Os and daily weights.    - Place on telemetry  - Low sodium diet  - Place on fluid restriction of .   - Cardiology  consulted  - The patient's volume status is   - order echocardiogram        Hypertension  Patients blood pressure range in the last 24 hours was: BP  Min: 180/80  Max: 240/111.The patient's inpatient anti-hypertensive regimen is listed below:  Current Antihypertensives  amLODIPine tablet 5 mg, Daily, Oral  carvediloL tablet 25 mg, 2 times daily, Oral  furosemide injection 40 mg, Every 12 hours, Intravenous  hydrALAZINE injection 10 mg, Every 4 hours PRN, Intravenous    Plan  - BP is uncontrolled, will adjust as follows:  Resume home blood pressure medicines  -  plans as above      VTE Risk Mitigation (From admission, onward)           Ordered     enoxaparin injection 40 mg  Daily         11/17/24 1812     IP VTE HIGH RISK PATIENT  Once         11/17/24 1812     Place sequential compression device  Until discontinued         11/17/24 1812                       On 11/17/2024, patient should be placed in hospital observation services under my care.             Maria D Ruiz MD  Department of Hospital Medicine  [facility] - Telemetry          "

## 2024-11-18 NOTE — HPI
84 y.o. male with a past medical history of Arthritis, Hypertension, Renal disorder, and Stroke transferred from [facility] for .   left-sided chest pain that started this morning accompanied by cough and shortness of breath x 2 days duration. CP is non radiating. He states he ran out of his blood pressure pills and has not been taking them. BP was 201/85 on presentation.  After arriving here from [facility], he had fever up to 102.1. No sick contacts.   He does have left-sided residual deficits from a previous stroke.  No leg swelling.  No nausea vomiting or diarrhea.  Trop x 1 - neg.  CXR showed bibasilar airspace disease.

## 2024-11-18 NOTE — ASSESSMENT & PLAN NOTE
- /85 upon admission  - on Amlodipine, Coreg as an outpatient- out of Amlodipine x few weeks  - home medication resumed; SBP down to 120s-140s overnight  - continue current medication regimen  - monitor BP and adjust meds prn

## 2024-11-18 NOTE — ASSESSMENT & PLAN NOTE
Patient's most recent potassium results are listed below.   Recent Labs     11/17/24  0944   K 2.9*     Plan  - Replete potassium per protocol  - Monitor potassium Daily  - Patient's hypokalemia is stable

## 2024-11-18 NOTE — PLAN OF CARE
went to meet with patient. Patient's daughter Ashley and granddaughter at bedside. Ashley reports patient has been living with her for about 2 years. He uses a rolling walker at home. Patient does not have Home Health. He does not drive, but his daughter to transports to appointments. He does not have a PCP. Daughter is agreeable to PCC follow-up. Appointment to be scheduled. She is also requesting his Cardiologist be changed from [facility] to [facility]. In-Basket message sent to [facility] staff for follow-up. Patient and Daughter encouraged to call with any questions or concerns.  will continue to follow patient through transitions of care and assist with any discharge needs.     Emergency Contacts    Name Relation Home Work Mobile   Ashley Lane Daughter   [phone]   GILA OLSON Son   [phone]       Future Appointments   Date Time Provider Department Center   11/25/2024 11:00 AM Anna Quinonez MD [facility]PR[facility]   12/4/2024  2:00 PM Gaurang Cherry MD [facility] CARDIO [place] [facility]           11/18/24 0943   Discharge Assessment   Assessment Type Discharge Planning Assessment   Confirmed/corrected address, phone number and insurance Yes   Confirmed Demographics Correct on Facesheet   Source of Information patient;family;health record   People in Home child(chanelle), adult   Facility Arrived From: Home   Do you expect to return to your current living situation? Yes   Do you have help at home or someone to help you manage your care at home? Yes   Who are your caregiver(s) and their phone number(s)? Ahsley Lane Daughter   [phone]  GILA OLSON Son   [phone]   Prior to hospitilization cognitive status: Alert/Oriented   Current cognitive status: Alert/Oriented   Walking or Climbing Stairs Difficulty yes   Walking or Climbing Stairs ambulation difficulty, requires equipment   Dressing/Bathing Difficulty no   Equipment Currently Used at Home walker, rolling    Do you take prescription medications? Yes   Do you have prescription coverage? Yes   Do you have any problems affording any of your prescribed medications? No   Is the patient taking medications as prescribed? yes   Who is going to help you get home at discharge? Ashley Lane Daughter   [phone]   How do you get to doctors appointments? family or friend will provide   Are you on dialysis? No   Do you take coumadin? No   Discharge Plan A Home with family   Discharge Plan B Home with family;Home Health   DME Needed Upon Discharge  none   Discharge Plan discussed with: Patient;Adult children   Transition of Care Barriers None   Physical Activity   On average, how many days per week do you engage in moderate to strenuous exercise (like a brisk walk)? Pt Declined   On average, how many minutes do you engage in exercise at this level? Pt Declined   Financial Resource Strain   How hard is it for you to pay for the very basics like food, housing, medical care, and heating? Pt Declined   Housing Stability   In the last 12 months, was there a time when you were not able to pay the mortgage or rent on time? N   At any time in the past 12 months, were you homeless or living in a shelter (including now)? N   Transportation Needs   Has the lack of transportation kept you from medical appointments, meetings, work or from getting things needed for daily living? No   Food Insecurity   Within the past 12 months, you worried that your food would run out before you got the money to buy more. Pt Declined   Within the past 12 months, the food you bought just didn't last and you didn't have money to get more. Pt Declined   Stress   Do you feel stress - tense, restless, nervous, or anxious, or unable to sleep at night because your mind is troubled all the time - these days? Pt Declined   Social Isolation   How often do you feel lonely or isolated from those around you?  Patient declined   Alcohol Use   Q1: How often do you have a  drink containing alcohol? Pt Declined   Q2: How many drinks containing alcohol do you have on a typical day when you are drinking? Pt Declined   Q3: How often do you have six or more drinks on one occasion? Pt Declined   Utilities   In the past 12 months has the electric, gas, oil, or water company threatened to shut off services in your home? No   OTHER   Name(s) of People in Home Ashley Lane Daughter [phone]     Kelsi Rodriguez RN    [phone]

## 2024-11-18 NOTE — ASSESSMENT & PLAN NOTE
- K+ 2.9 this AM and 2.5 yesterday  - on daily KDur  - will give additional doses KDur 40mEq po Q2hrs x 2 doses for total of 90mEq today  - goal K+>4.0 Mg >2.0

## 2024-11-18 NOTE — ASSESSMENT & PLAN NOTE
Patient has a diagnosis of pneumonia. The cause of the pneumonia is unknown at this time. The pneumonia is stable. The patient has the following signs/symptoms of pneumonia: cough and shortness of breath. The patient does not have a current oxygen requirement and the patient does not have a home oxygen requirement. I have reviewed the pertinent imaging. The following cultures have been collected:  Blood  The culture results are listed below.     Current antimicrobial regimen consists of the antibiotics listed below. Will monitor patient closely and     Antibiotics (From admission, onward)      Start     Stop Route Frequency Ordered    11/18/24 0900  azithromycin tablet 500 mg         11/21/24 0859 Oral Daily 11/17/24 1812 11/17/24 1915  cefTRIAXone injection 1 g         -- IV Every 24 hours (non-standard times) 11/17/24 1812            Microbiology Results (last 7 days)       Procedure Component Value Units Date/Time    Blood culture [[id number]] Collected: 11/17/24 1957    Order Status: Completed Specimen: Blood from Peripheral, Hand, Left Updated: 11/18/24 0745     Blood Culture, Routine No Growth to date            CXR: Findings suggestive mild interstitial edema.      CT/ abd   Dependent pleural effusions and bibasilar airspace opacity/atelectasis

## 2024-11-18 NOTE — PLAN OF CARE
Problem: Adult Inpatient Plan of Care  Goal: Plan of Care Review  Outcome: Progressing     Problem: Chest Pain  Goal: Resolution of Chest Pain Symptoms  Outcome: Progressing     Problem: Pain Acute  Goal: Optimal Pain Control and Function  Outcome: Progressing     Problem: Skin Injury Risk Increased  Goal: Skin Health and Integrity  Outcome: Progressing      potassium 2.1

## 2024-11-18 NOTE — ASSESSMENT & PLAN NOTE
- presented with chest pain and SOB in setting of uncontrolled HTN  - NTpro BNP 8320; on IV Lasix 40mg BID with 1.4L out overnight; SOB improved  - echo pending  - SOB improved and possibly all related to uncontrolled HTN; anticipate de escalation of IV Lasix    Hydroquinone Counseling:  Patient advised that medication may result in skin irritation, lightening (hypopigmentation), dryness, and burning.  In the event of skin irritation, the patient was advised to reduce the amount of the drug applied or use it less frequently.  Rarely, spots that are treated with hydroquinone can become darker (pseudoochronosis).  Should this occur, patient instructed to stop medication and call the office. The patient verbalized understanding of the proper use and possible adverse effects of hydroquinone.  All of the patient's questions and concerns were addressed.

## 2024-11-18 NOTE — CONSULTS
[facility] - Telemetry  Cardiology  Consult Note    Patient Name: Stanley Granados  MRN: [id number]  Admission Date: 11/17/2024  Hospital Length of Stay: 0 days  Code Status: Full Code   Attending Provider: Kiara Peraza*   Consulting Provider: SUZI Silva ANP  Primary Care Physician: Phuong Umaña MD  Principal Problem:<principal problem not specified>    Patient information was obtained from patient, past medical records, and ER records.     Inpatient consult to [facility]  Consult performed by: nAa Rogers APRN, FRANKLIN  Consult ordered by: Maria D Ruiz MD  Reason for consult: elevated troponin; CHF        Subjective:     Chief Complaint:  SOB and chest pain      HPI:   83yo male with HTN, CHF, fever, hypokalemia, PNA, CKD stage III, CAD (RCA  on [facility] in 2014 with collaterals) and PAD who presented to the ER with complaints of left sided chest pain. He was accompanied by his daughter who helped provide his HPI. He reports left sided chest pain with radiation to his left arm along with cough and SOB for the past 2 days. His daughter reports he would point to the left side of his abdomen when asked about pain PTA. His daughter reported noted his breathing to be heavy and was getting worse so she brought him to the ER at [facility]. BP upon arrival 201/82 temp 102.2. She reports he takes his own medications but some of his meds ran out and they were unable to get them refilled mainly Ranexa and Norvasc. Labs CBC with H&H 8.6&26.8 BMP with K+ 2.52 BUN 16 creatinine 1.2 NT pro BNP 8320 Troponin 0.019-0.043-0.054 EKG with NSR with 1st degree AVB with non specific STTWC unchanged from previous. Admitted to [facility] for PNA treatment as well as BP management. Cardiology consulted du eto elevated troponin     Past Medical History:   Diagnosis Date    Arthritis     Hypertension     Renal disorder     Stroke        History reviewed. No pertinent surgical history.    Review of  patient's allergies indicates:   Allergen Reactions    Nsaids (non-steroidal anti-inflammatory drug)        No current facility-administered medications on file prior to encounter.     Current Outpatient Medications on File Prior to Encounter   Medication Sig    amLODIPine (NORVASC) 5 MG tablet Take 5 mg by mouth.    atorvastatin (LIPITOR) 40 MG tablet Take 40 mg by mouth.    carvediloL (COREG) 25 MG tablet Take 25 mg by mouth 2 (two) times daily.    ferrous gluconate (FERGON) 324 MG tablet Take 1 tablet by mouth every morning.    fexofenadine (ALLEGRA) 180 MG tablet Take 1 tablet (180 mg total) by mouth once daily.    magnesium oxide (MAG-OX) 400 mg (241.3 mg magnesium) tablet Take 1 tablet by mouth once daily.    nitroGLYCERIN (NITROSTAT) 0.4 MG SL tablet Place 0.4 mg under the tongue.    potassium chloride (KLOR-CON) 10 MEQ TbSR Take 10 mEq by mouth.    ranolazine (RANEXA) 1,000 mg Tb12 Take 1,000 mg by mouth 2 (two) times daily.    sodium bicarbonate 650 MG tablet Take 650 mg by mouth 2 (two) times daily.     Family History       Problem Relation (Age of Onset)    COPD Father    Diabetes Brother, Daughter, Son    Hyperlipidemia Daughter    Hypertension Mother, Brother, Daughter, Son    Lupus Daughter    Lymphoma Daughter    No Known Problems Sister, Brother          Tobacco Use    Smoking status: Former     Current packs/day: 0.00     Types: Cigarettes     Quit date: 1994     Years since quittin.4     Passive exposure: Never    Smokeless tobacco: Never   Substance and Sexual Activity    Alcohol use: Not Currently    Drug use: Not Currently    Sexual activity: Not Currently     Review of Systems   Constitutional: Negative for chills, decreased appetite, diaphoresis, fever, malaise/fatigue, weight gain and weight loss.   Cardiovascular:  Positive for chest pain and dyspnea on exertion. Negative for claudication, irregular heartbeat, leg swelling, near-syncope, orthopnea, palpitations and paroxysmal  nocturnal dyspnea.   Respiratory:  Negative for cough, shortness of breath, snoring, sputum production and wheezing.    Endocrine: Negative for cold intolerance, heat intolerance, polydipsia, polyphagia and polyuria.   Skin:  Negative for color change, dry skin, itching, nail changes and poor wound healing.   Musculoskeletal:  Negative for back pain, gout, joint pain and joint swelling.   Gastrointestinal:  Negative for bloating, abdominal pain, constipation, diarrhea, hematemesis, hematochezia, melena, nausea and vomiting.   Genitourinary:  Negative for dysuria, hematuria and nocturia.   Neurological:  Negative for dizziness, headaches, light-headedness, numbness, paresthesias and weakness.   Psychiatric/Behavioral:  Negative for altered mental status, depression and memory loss.      Objective:     Vital Signs (Most Recent):  Temp: 99.1 °F (37.3 °C) (11/18/24 0731)  Pulse: 79 (11/18/24 0731)  Resp: 18 (11/18/24 0731)  BP: (!) 144/66 (11/18/24 0731)  SpO2: 95 % (11/18/24 0731) Vital Signs (24h Range):  Temp:  [97.8 °F (36.6 °C)-101.3 °F (38.5 °C)] 99.1 °F (37.3 °C)  Pulse:  [62-86] 79  Resp:  [17-28] 18  SpO2:  [93 %-100 %] 95 %  BP: (121-240)/() 144/66     Weight: 69.1 kg (152 lb 5.4 oz)  Body mass index is 21.86 kg/m².    SpO2: 95 %         Intake/Output Summary (Last 24 hours) at 11/18/2024 1100  Last data filed at 11/18/2024 0945  Gross per 24 hour   Intake 120 ml   Output 1400 ml   Net -1280 ml       Lines/Drains/Airways       Peripheral Intravenous Line  Duration                  Peripheral IV - Single Lumen 11/17/24 1000 20 G Right Antecubital 1 day         Peripheral IV - Single Lumen 11/17/24 1127 20 G Anterior;Left Forearm <1 day                     Physical Exam  Constitutional:       General: He is not in acute distress.     Appearance: He is well-developed.   Cardiovascular:      Rate and Rhythm: Normal rate and regular rhythm.      Heart sounds: No murmur heard.     No gallop.   Pulmonary:       Effort: Pulmonary effort is normal. No respiratory distress.      Breath sounds: Normal breath sounds. No wheezing.   Abdominal:      General: Bowel sounds are normal. There is no distension.      Palpations: Abdomen is soft.      Tenderness: There is no abdominal tenderness.   Skin:     General: Skin is warm and dry.   Neurological:      Mental Status: He is alert and oriented to person, place, and time.          Significant Labs: BMP:   Recent Labs   Lab 11/17/24  0944 11/18/24  0259   * 94    143   K 2.9* 2.5*    105   CO2 29 26   BUN 15 16   CREATININE 0.85 1.2   CALCIUM 8.0* 8.1*   MG 1.7 1.5*   , CBC   Recent Labs   Lab 11/17/24  0944   WBC 5.97   HGB 8.6*   HCT 26.7*      , and Troponin   Recent Labs   Lab 11/17/24  1227 11/17/24  1958 11/18/24  0008   TROPONINI 0.019 0.043* 0.054*       Significant Imaging: Echocardiogram: Transthoracic echo (TTE) complete (Cupid Only): No results found for this or any previous visit.  Assessment and Plan:     Elevated troponin  - presented with chest pain and SOB in setting of PNA, fever and uncontrolled HTN  - troponin 0.019-0.043-0.054; EKG with non specific STTWC; will repeat troponin this afternoon but feel flat/plateau  - history of CAD with RCA  with collaterals from [facility] in 2014; followed by [facility] on statin, BB and Ranexa as an outpatient- no ASA for unknown reasons  - feel troponin elevation related to demand etiology in setting of uncontrolled HTN and PNA at this time; no concern for ACS; echo pending for evaluation of LVEF and for WMA- no plans for further cardiac workup as of now     Hypokalemia  - K+ 2.9 this AM and 2.5 yesterday  - on daily KDur  - will give additional doses KDur 40mEq po Q2hrs x 2 doses for total of 90mEq today  - goal K+>4.0 Mg >2.0    Fever  - Temp 102.3 upon admission  - on abx for PNA  - management per primary team     Atherosclerosis of native coronary artery of native heart with angina pectoris  -  history of CAD with RCA  with collateral from [facility] in 2014  - followed by [facility]; on BB, Norvasc, statin and Ranexa as an outpatient  - BP elevated upon admission but better controlled currently; continue home medication regimen; will resume Ranexa     Congestive heart failure  - presented with chest pain and SOB in setting of uncontrolled HTN  - NTpro BNP 8320; on IV Lasix 40mg BID with 1.4L out overnight; SOB improved  - echo pending  - SOB improved and possibly all related to uncontrolled HTN; anticipate de escalation of IV Lasix     Hypertension  - /85 upon admission  - on Amlodipine, Coreg as an outpatient- out of Amlodipine x few weeks  - home medication resumed; SBP down to 120s-140s overnight  - continue current medication regimen  - monitor BP and adjust meds prn         VTE Risk Mitigation (From admission, onward)           Ordered     enoxaparin injection 40 mg  Daily         11/17/24 1812     IP VTE HIGH RISK PATIENT  Once         11/17/24 1812     Place sequential compression device  Until discontinued         11/17/24 1812                    Thank you for your consult. I will follow-up with patient. Please contact us if you have any additional questions.    SUZI Silva, ANP  Cardiology   [facility] - Telemetry

## 2024-11-19 PROBLEM — I11.0 DIASTOLIC HEART FAILURE SECONDARY TO HYPERTENSION: Chronic | Status: ACTIVE | Noted: 2017-11-17

## 2024-11-19 PROBLEM — I50.30 DIASTOLIC HEART FAILURE SECONDARY TO HYPERTENSION: Chronic | Status: ACTIVE | Noted: 2017-11-17

## 2024-11-19 LAB
ANION GAP SERPL CALC-SCNC: 7 MMOL/L (ref 8–16)
BUN SERPL-MCNC: 25 MG/DL (ref 8–23)
CALCIUM SERPL-MCNC: 8.1 MG/DL (ref 8.7–10.5)
CHLORIDE SERPL-SCNC: 104 MMOL/L (ref 95–110)
CO2 SERPL-SCNC: 29 MMOL/L (ref 23–29)
CREAT SERPL-MCNC: 1.3 MG/DL (ref 0.5–1.4)
EST. GFR  (NO RACE VARIABLE): 55 ML/MIN/1.73 M^2
GLUCOSE SERPL-MCNC: 80 MG/DL (ref 70–110)
MAGNESIUM SERPL-MCNC: 1.8 MG/DL (ref 1.6–2.6)
POTASSIUM SERPL-SCNC: 3.3 MMOL/L (ref 3.5–5.1)
SODIUM SERPL-SCNC: 140 MMOL/L (ref 136–145)

## 2024-11-19 PROCEDURE — 99233 SBSQ HOSP IP/OBS HIGH 50: CPT | Mod: ,,, | Performed by: NURSE PRACTITIONER

## 2024-11-19 PROCEDURE — 63600175 PHARM REV CODE 636 W HCPCS: Performed by: INTERNAL MEDICINE

## 2024-11-19 PROCEDURE — 25000003 PHARM REV CODE 250: Performed by: INTERNAL MEDICINE

## 2024-11-19 PROCEDURE — 63600175 PHARM REV CODE 636 W HCPCS: Performed by: FAMILY MEDICINE

## 2024-11-19 PROCEDURE — 97161 PT EVAL LOW COMPLEX 20 MIN: CPT

## 2024-11-19 PROCEDURE — 97530 THERAPEUTIC ACTIVITIES: CPT

## 2024-11-19 PROCEDURE — 36415 COLL VENOUS BLD VENIPUNCTURE: CPT | Performed by: INTERNAL MEDICINE

## 2024-11-19 PROCEDURE — 63600175 PHARM REV CODE 636 W HCPCS: Performed by: NURSE PRACTITIONER

## 2024-11-19 PROCEDURE — 97535 SELF CARE MNGMENT TRAINING: CPT

## 2024-11-19 PROCEDURE — 11000001 HC ACUTE MED/SURG PRIVATE ROOM

## 2024-11-19 PROCEDURE — 25000003 PHARM REV CODE 250: Performed by: NURSE PRACTITIONER

## 2024-11-19 PROCEDURE — 97116 GAIT TRAINING THERAPY: CPT

## 2024-11-19 PROCEDURE — 97165 OT EVAL LOW COMPLEX 30 MIN: CPT

## 2024-11-19 PROCEDURE — 83735 ASSAY OF MAGNESIUM: CPT | Performed by: INTERNAL MEDICINE

## 2024-11-19 PROCEDURE — 80048 BASIC METABOLIC PNL TOTAL CA: CPT | Performed by: INTERNAL MEDICINE

## 2024-11-19 PROCEDURE — 63700000 PHARM REV CODE 250 ALT 637 W/O HCPCS: Performed by: INTERNAL MEDICINE

## 2024-11-19 RX ORDER — RANOLAZINE 500 MG/1
1000 TABLET, EXTENDED RELEASE ORAL 2 TIMES DAILY
Status: DISCONTINUED | OUTPATIENT
Start: 2024-11-19 | End: 2024-11-20 | Stop reason: HOSPADM

## 2024-11-19 RX ORDER — POTASSIUM CHLORIDE 20 MEQ/1
40 TABLET, EXTENDED RELEASE ORAL
Status: COMPLETED | OUTPATIENT
Start: 2024-11-19 | End: 2024-11-19

## 2024-11-19 RX ORDER — MAGNESIUM SULFATE 1 G/100ML
1 INJECTION INTRAVENOUS ONCE
Status: COMPLETED | OUTPATIENT
Start: 2024-11-19 | End: 2024-11-19

## 2024-11-19 RX ADMIN — CARVEDILOL 25 MG: 25 TABLET, FILM COATED ORAL at 09:11

## 2024-11-19 RX ADMIN — RANOLAZINE 1000 MG: 500 TABLET, FILM COATED, EXTENDED RELEASE ORAL at 10:11

## 2024-11-19 RX ADMIN — POTASSIUM CHLORIDE 40 MEQ: 1500 TABLET, EXTENDED RELEASE ORAL at 10:11

## 2024-11-19 RX ADMIN — AZITHROMYCIN DIHYDRATE 500 MG: 250 TABLET ORAL at 08:11

## 2024-11-19 RX ADMIN — FUROSEMIDE 20 MG: 10 INJECTION, SOLUTION INTRAMUSCULAR; INTRAVENOUS at 08:11

## 2024-11-19 RX ADMIN — CARVEDILOL 25 MG: 25 TABLET, FILM COATED ORAL at 08:11

## 2024-11-19 RX ADMIN — MAGNESIUM OXIDE TAB 400 MG (241.3 MG ELEMENTAL MG) 400 MG: 400 (241.3 MG) TAB at 08:11

## 2024-11-19 RX ADMIN — ATORVASTATIN CALCIUM 40 MG: 40 TABLET, FILM COATED ORAL at 09:11

## 2024-11-19 RX ADMIN — SODIUM BICARBONATE 650 MG TABLET 650 MG: at 09:11

## 2024-11-19 RX ADMIN — MAGNESIUM SULFATE IN DEXTROSE 1 G: 10 INJECTION, SOLUTION INTRAVENOUS at 08:11

## 2024-11-19 RX ADMIN — SODIUM BICARBONATE 650 MG TABLET 650 MG: at 08:11

## 2024-11-19 RX ADMIN — Medication 324 MG: at 08:11

## 2024-11-19 RX ADMIN — CEFTRIAXONE SODIUM 1 G: 1 INJECTION, POWDER, FOR SOLUTION INTRAMUSCULAR; INTRAVENOUS at 09:11

## 2024-11-19 RX ADMIN — CETIRIZINE HYDROCHLORIDE 10 MG: 10 TABLET, FILM COATED ORAL at 08:11

## 2024-11-19 RX ADMIN — AMLODIPINE BESYLATE 5 MG: 5 TABLET ORAL at 08:11

## 2024-11-19 RX ADMIN — RANOLAZINE 1000 MG: 500 TABLET, FILM COATED, EXTENDED RELEASE ORAL at 09:11

## 2024-11-19 RX ADMIN — POTASSIUM CHLORIDE 10 MEQ: 750 TABLET, EXTENDED RELEASE ORAL at 08:11

## 2024-11-19 RX ADMIN — ENOXAPARIN SODIUM 40 MG: 40 INJECTION SUBCUTANEOUS at 05:11

## 2024-11-19 RX ADMIN — POTASSIUM CHLORIDE 40 MEQ: 1500 TABLET, EXTENDED RELEASE ORAL at 12:11

## 2024-11-19 NOTE — PROGRESS NOTES
[facility] - Telemetry  Cardiology  Progress Note    Patient Name: Stanley Granados  MRN: [id number]  Admission Date: 11/17/2024  Hospital Length of Stay: 1 days  Code Status: Full Code   Attending Physician: Phuong Gee MD   Primary Care Physician: Phuong Umaña MD  Expected Discharge Date:   Principal Problem:<principal problem not specified>    Subjective:     Hospital Course:   11/18/2024 per HPI   11/19/2024 Lasix down titrated to 20mg IV BID with 800cc out  overnight. SBP down to 130s-150s Creatinine 1.3 K+ 3.3 Mg 1.8- replacement ordered         Review of Systems   Constitutional: Negative for chills, decreased appetite, diaphoresis, fever, malaise/fatigue, weight gain and weight loss.   Cardiovascular:  Negative for chest pain, claudication, dyspnea on exertion, irregular heartbeat, leg swelling, near-syncope, orthopnea, palpitations and paroxysmal nocturnal dyspnea.   Respiratory:  Negative for cough, shortness of breath, snoring, sputum production and wheezing.    Endocrine: Negative for cold intolerance, heat intolerance, polydipsia, polyphagia and polyuria.   Skin:  Negative for color change, dry skin, itching, nail changes and poor wound healing.   Musculoskeletal:  Negative for back pain, gout, joint pain and joint swelling.   Gastrointestinal:  Negative for bloating, abdominal pain, constipation, diarrhea, hematemesis, hematochezia, melena, nausea and vomiting.   Genitourinary:  Negative for dysuria, hematuria and nocturia.   Neurological:  Negative for dizziness, headaches, light-headedness, numbness, paresthesias and weakness.   Psychiatric/Behavioral:  Negative for altered mental status, depression and memory loss.      Objective:     Vital Signs (Most Recent):  Temp: 98.4 °F (36.9 °C) (11/19/24 0743)  Pulse: 69 (11/19/24 0743)  Resp: 19 (11/19/24 0434)  BP: (!) 154/76 (11/19/24 0743)  SpO2: 96 % (11/19/24 0743) Vital Signs (24h Range):  Temp:  [97.6 °F (36.4 °C)-99.1 °F (37.3 °C)] 98.4 °F (36.9  °C)  Pulse:  [66-82] 69  Resp:  [18-19] 19  SpO2:  [93 %-98 %] 96 %  BP: (136-157)/(64-76) 154/76     Weight: 69.1 kg (152 lb 5.4 oz)  Body mass index is 21.86 kg/m².     SpO2: 96 %         Intake/Output Summary (Last 24 hours) at 11/19/2024 0812  Last data filed at 11/18/2024 1834  Gross per 24 hour   Intake 360 ml   Output 800 ml   Net -440 ml       Lines/Drains/Airways       Peripheral Intravenous Line  Duration                  Peripheral IV - Single Lumen 11/17/24 1000 20 G Right Antecubital 1 day         Peripheral IV - Single Lumen 11/17/24 1127 20 G Anterior;Left Forearm 1 day                       Physical Exam  Constitutional:       General: He is not in acute distress.     Appearance: He is well-developed.   Neck:      Vascular: No JVD.   Cardiovascular:      Rate and Rhythm: Normal rate and regular rhythm.      Heart sounds: No murmur heard.     No gallop.   Pulmonary:      Effort: Pulmonary effort is normal. No respiratory distress.      Breath sounds: Normal breath sounds. No wheezing.   Abdominal:      General: Bowel sounds are normal. There is no distension.      Palpations: Abdomen is soft.      Tenderness: There is no abdominal tenderness.   Musculoskeletal:      Cervical back: Normal range of motion and neck supple.   Skin:     General: Skin is warm and dry.   Neurological:      Mental Status: He is alert.      Comments: Disoriented    Psychiatric:         Behavior: Behavior normal.         Thought Content: Thought content normal.         Judgment: Judgment normal.            Significant Labs: BMP:   Recent Labs   Lab 11/17/24  0944 11/18/24  0259 11/19/24  0323   * 94 80    143 140   K 2.9* 2.5* 3.3*    105 104   CO2 29 26 29   BUN 15 16 25*   CREATININE 0.85 1.2 1.3   CALCIUM 8.0* 8.1* 8.1*   MG 1.7 1.5* 1.8    and CBC   Recent Labs   Lab 11/17/24  0944   WBC 5.97   HGB 8.6*   HCT 26.7*          Significant Imaging: Echocardiogram: Transthoracic echo (TTE) complete  ([facility] Only):   Results for orders placed or performed during the hospital encounter of 11/17/24   Echo   Result Value Ref Range    BSA 1.85 m2    Quach's Biplane MOD Ejection Fraction 64 %    A2C EF 65 %    A4C EF 71 %    LVOT stroke volume 207.3 cm3    LVIDd 4.9 3.5 - 6.0 cm    LV Systolic Volume 44.06 mL    LV Systolic Volume Index 23.7 mL/m2    LVIDs 3.3 2.1 - 4.0 cm    LV ESV A2C 126.41 mL    LV Diastolic Volume 113.17 mL    LV ESV A4C 124.00 mL    LV Diastolic Volume Index 60.84 mL/m2    LV EDV A2C 69.304071145788894 mL    LV EDV A4C 60.98 mL    Left Ventricular End Systolic Volume by Teichholz Method 44.06 mL    Left Ventricular End Diastolic Volume by Teichholz Method 113.17 mL    IVS 1.7 (A) 0.6 - 1.1 cm    LVOT diameter 2.9 cm    LVOT area 6.6 cm2    FS 32.7 28 - 44 %    Left Ventricle Relative Wall Thickness 0.73 cm    PW 1.8 (A) 0.6 - 1.1 cm    LV mass 395.8 g    LV Mass Index 212.8 g/m2    MV Peak E Bill 1.01 m/s    TDI LATERAL 0.06 m/s    TDI SEPTAL 0.06 m/s    E/E' ratio 16.83 m/s    MV Peak A Bill 0.76 m/s    TR Max Bill 2.79 m/s    E/A ratio 1.33     E wave deceleration time 254.79 msec    LV SEPTAL E/E' RATIO 16.83 m/s    LV LATERAL E/E' RATIO 16.83 m/s    PV Peak S Bill 0.68 m/s    PV Peak D Bill 0.42 m/s    Pulm vein S/D ratio 1.62     LVOT peak bill 1.3 m/s    Left Ventricular Outflow Tract Mean Velocity 1.03 cm/s    Left Ventricular Outflow Tract Mean Gradient 4.55 mmHg    RV- torres basal diam 3.9 cm    RV S' 12.41 cm/s    TAPSE 2.19 cm    RV/LV Ratio 0.80 cm    LA Vol (MOD) 131.30 mL    SLOANE (MOD) 70.6 mL/m2    RA area length vol 65.33 mL    RA Area 22.0 cm2    RA Vol 68.34 mL    AV regurgitation pressure 1/2 time [id number] ms    AR Max Bill 4.11 m/s    AV mean gradient 9.4 mmHg    AV peak gradient 13.0 mmHg    Ao peak bill 1.8 m/s    Ao VTI 41.9 cm    LVOT peak VTI 31.4 cm    AV valve area 4.9 cm²    AV Velocity Ratio 0.72     AV index (prosthetic) 0.75     GIANA by Velocity Ratio 4.8 cm²     Mr max eugenie 4.93 m/s    MV mean gradient 2 mmHg    MV peak gradient 4 mmHg    MV valve area by continuity eq 6.56 cm2    MV VTI 31.6 cm    Triscuspid Valve Regurgitation Peak Gradient 31 mmHg    PV PEAK VELOCITY 0.91 m/s    PV peak gradient 3 mmHg    Pulmonary Valve Mean Velocity 0.66 m/s    Sinus 3.64 cm    STJ 2.52 cm    Ascending aorta 3.21 cm    IVC diameter 1.29 cm    Mean e' 0.06 m/s    ZLVIDS 0.26     ZLVIDD -0.55     LA area A4C 36.09 cm2    LA area A2C 33.69 cm2    TV resting pulmonary artery pressure 34 mmHg    RV TB RVSP 6 mmHg    Est. RA pres 3 mmHg    Narrative      Left Ventricle: The left ventricle is normal in size. Moderate septal   thickening. There is concentric hypertrophy. There is normal systolic   function. Biplane (2D) method of discs ejection fraction is 64%. Grade II   diastolic dysfunction. Elevated left ventricular filling pressure.    Right Ventricle: Normal right ventricular cavity size. Wall thickness   is normal. Systolic function is normal.    Left Atrium: Left atrium is severely dilated.    Aortic Valve: The aortic valve is a trileaflet valve. There is moderate   aortic valve sclerosis. Mildly restricted motion. There is moderate aortic   regurgitation with an anteromedial eccentrically directed jet.    Pulmonary Artery: The estimated pulmonary artery systolic pressure is   34 mmHg.    IVC/SVC: Normal venous pressure at 3 mmHg.       Assessment and Plan:     Brief HPI: Seen this AM on NP rounds while resting in bed. Denies any complaints. Discussed POC as detailed below-verbalized understanding and agrees with POC      Elevated troponin  - presented with chest pain and SOB in setting of PNA, fever and uncontrolled HTN  - troponin 0.019-0.043-0.054; EKG with non specific STTWC- troponin flat/plateau  - history of CAD with RCA  with collaterals from [facility] in 2014; followed by [facility] on statin, BB and Ranexa as an outpatient- no ASA for unknown reasons  - feel troponin  elevation related to demand etiology in setting of uncontrolled HTN and PNA at this time; no concern for ACS; echo with normal LVEF no WMA;  no plans for further cardiac workup as of now   - continue GDMT for CAD and follow up with established cardiologist     Hypokalemia  - K+ 2.5 upon admission up to 2.9 yesterday; 3.3 this AM with Mg 1.8- replacement ordered   - on daily KDur  - goal K+>4.0 Mg >2.0    Fever  - Temp 102.3 upon admission  - on abx for PNA  - management per primary team     Atherosclerosis of native coronary artery of native heart with angina pectoris  - history of CAD with RCA  with collateral from [facility] in 2014  - followed by [facility]; on BB, Norvasc, statin and Ranexa as an outpatient  - BP elevated upon admission but better controlled currently; continue home medication regimen; will resume Ranexa     Congestive heart failure  - related to diastolic etiology  - presented with chest pain and SOB in setting of uncontrolled HTN  - NTpro BNP 8320; initially on IV Lasix 40mg BID with down titration to 20mg IV BID yesterday; 800cc out overnight; negative 1.8L since admission  SOB improved; recommend stopping Lasix and do not anticipate daily dosing needed as an outpatient   - echo with EF 64% and grade II diastolic dysfunction   - SOB improved and likely  all related to uncontrolled HTN    Hypertension  - /85 upon admission  - on Amlodipine, Coreg as an outpatient- out of Amlodipine x few weeks  - home medication resumed; SBP down to 130s-150s overnight  - BP fairly well controlled; continue current medication regimen  - monitor BP and adjust meds prn         VTE Risk Mitigation (From admission, onward)           Ordered     enoxaparin injection 40 mg  Daily         11/17/24 1812     IP VTE HIGH RISK PATIENT  Once         11/17/24 1812     Place sequential compression device  Until discontinued         11/17/24 1812                  Plan as detailed above. Control BP. No plans for  further cardiac workup. Suitable for discharge from cardiac standpoint. Cardiology to sign off. Please call with any questions or concerns   SUZI Silva, ANP  Cardiology  [facility] - Telemetry

## 2024-11-19 NOTE — PLAN OF CARE
OT evaluation completed. Patient with PLOF per patient report and per phone call clarification from daughter, Ashley, whom he resides with of being modified independent in ADLs/ mobility with use of rollator, assist from daughter for IADLs/medication management. On evaluation this date patient was oriented to self, hospital, grossly to situation, but not oriented to time (month or year which was escalated to floor RN Will whom reports that patient has intermittently had difficulty with orientation). Patient requiring minimal to CGA for functional mobility inclusive of in / out of bathroom requiring minimal to moderate verbal cues for safety. Patient with presentation of limited B shoulder ROM which patient reports is chronic/unknown per daughter, and currently needs minimal to moderate assist for toileting and dressing. Skilled acute OT to follow. Recommend moderate level intensity - patient now also with telesitter present in room; will progress as able to work toward goal for low intensity with family support.      Problem: Occupational Therapy  Goal: Occupational Therapy Goal  Description: Goals to be met by: 12/17/2024     Patient will increase functional independence with ADLs by performing:    UE Dressing with Set-up Assistance.  LE Dressing with Supervision.  Grooming while standing at sink with Modified [place].  Toileting from toilet with Modified [place] for hygiene and clothing management.   Functional mobility to / from bathroom inclusive of Toilet transfer to toilet with Modified [place] with use of least restrictive device.  100% reciprocation understanding for DME recommendations and at least 2 techniques for safety during ADL regimen.    Outcome: Progressing

## 2024-11-19 NOTE — ASSESSMENT & PLAN NOTE
- presented with chest pain and SOB in setting of PNA, fever and uncontrolled HTN  - troponin 0.019-0.043-0.054; EKG with non specific STTWC- troponin flat/plateau  - history of CAD with RCA  with collaterals from [facility] in 2014; followed by [facility] on statin, BB and Ranexa as an outpatient- no ASA for unknown reasons  - feel troponin elevation related to demand etiology in setting of uncontrolled HTN and PNA at this time; no concern for ACS; echo with normal LVEF no WMA;  no plans for further cardiac workup as of now   - continue GDMT for CAD and follow up with established cardiologist

## 2024-11-19 NOTE — PT/OT/SLP EVAL
Physical Therapy Evaluation and Treatment    Patient Name:  Stanley Granados   MRN:  [id number]    Recommendations:     Discharge Recommendations: Low Intensity Therapy (with family assistance)   Discharge Equipment Recommendations: none   Barriers to discharge: None    Assessment:     Stanley Granados is a 84 y.o. male admitted with a medical diagnosis of The primary encounter diagnosis was Acute on chronic congestive heart failure, unspecified heart failure type. Diagnoses of Chest pain, CHF (congestive heart failure), and Pneumonia were also pertinent to this visit. He presents with the following impairments/functional limitations: weakness, impaired endurance, impaired functional mobility, gait instability, impaired balance, decreased lower extremity function, decreased ROM.    PT evaluation completed. Pt's PLOF: MOD I with use of rollator. Pt at this time requires MIN A with bed mobility and CGA with transfers to standing/ambulation with use of RW. Therapy recommending low intensity therapy with family assistance post d/c from hospital. Therapy will continue to progress pt as able.     Rehab Prognosis: Good; patient would benefit from acute skilled PT services to address these deficits and reach maximum level of function.    Recent Surgery: * No surgery found *      Plan:     During this hospitalization, patient to be seen 3 x/week to address the identified rehab impairments via gait training, therapeutic activities, therapeutic exercises, neuromuscular re-education and progress toward the following goals:    Plan of Care Expires:  12/19/24    Subjective     Chief Complaint: none  Patient/Family Comments/goals: to return home  Pain/Comfort:  Pain Rating 1: 0/10  Pain Rating Post-Intervention 1: 0/10    Patients cultural, spiritual, Gnosticist conflicts given the current situation: no    Living Environment:  Lives with daughter in 1 story home with no steps to enter. WIS with [place].  Prior to admission, patients level of  function was MOD I with use of rollator.  Equipment used at home: walker, rolling, shower chair, rollator.  DME owned (not currently used): none.  Upon discharge, patient will have assistance from daughter.    Objective:     Communicated with Nurse prior to session.  Patient found HOB elevated with peripheral IV, PureWick, bed alarm  upon PT entry to room.    General Precautions: Standard, fall  Orthopedic Precautions:N/A   Braces: N/A  Respiratory Status: Room air    Exams:  Cognitive Exam:  Patient is oriented to Person, Place, Time, and Situation  RLE ROM: WFL  RLE Strength: WFL  LLE ROM: WFL  LLE Strength: WFL    Functional Mobility:  Bed Mobility:     Supine to Sit: minimum assistance  Sit to Supine: stand by assistance  Transfers:     Sit to Stand:  contact guard assistance with rolling walker  Gait: ~100ft with use of RW and CGA; noted forward trunk lean and slow gait speed; pt reports this is his baseline posture/gait pattern.       AM-PAC 6 CLICK MOBILITY  Total Score:18       Treatment & Education:  Pt/daughter educated on role of PT.   Pt educated on safe use with RW to improve stability and safety as well as cues to improve upright trunk posture in stance.   Pt educated on use of call button for mobility needs; pt understanding.     Patient left HOB elevated with all lines intact, call button in reach, bed alarm on, Nurse notified, and daughter present.    GOALS:   Multidisciplinary Problems       Physical Therapy Goals          Problem: Physical Therapy    Goal Priority Disciplines Outcome Interventions   Physical Therapy Goal     PT, PT/OT Progressing    Description: Goals to be met by: 24     Patient will increase functional independence with mobility by performin. Supine to sit with Modified [place]  2. Sit to supine with Modified [place]  3. Sit to stand transfer with Modified [place] with use of RW.  4. Bed to chair transfer with Modified [place] using Rolling  Walker  5. Gait  x 150 feet with Modified [place] using Rolling Walker.                          History:     Past Medical History:   Diagnosis Date    Arthritis     Hypertension     Renal disorder     Stroke        History reviewed. No pertinent surgical history.    Time Tracking:     PT Received On: 11/19/24  PT Start Time: 1030     PT Stop Time: 1055  PT Total Time (min): 25 min     Billable Minutes: Evaluation 10 and Gait Training 15      11/19/2024

## 2024-11-19 NOTE — PT/OT/SLP EVAL
Occupational Therapy   Evaluation and Treatment    Name: Stanley Granados  MRN: [id number]  Admitting Diagnosis: <principal problem not specified>  Recent Surgery: * No surgery found *      Recommendations:     Discharge Recommendations: Moderate Intensity Therapy  Discharge Equipment Recommendations:  none  Barriers to discharge:  Other (Comment) (cognition decline)    Assessment:     Stanley Granados is a 84 y.o. male with a medical diagnosis of <principal problem not specified>.  He presents with The primary encounter diagnosis was Acute on chronic congestive heart failure, unspecified heart failure type. Diagnoses of Chest pain, CHF (congestive heart failure), and Pneumonia were also pertinent to this visit.  Performance deficits affecting function: weakness, impaired endurance, gait instability, decreased upper extremity function, decreased safety awareness, decreased ROM, decreased coordination.      OT evaluation completed. On evaluation this date patient was oriented to self, hospital, grossly to situation, but not oriented to time (month or year which was escalated to floor RN Will whom reports that patient has intermittently had difficulty with orientation). Patient requiring minimal to CGA for functional mobility inclusive of in / out of bathroom requiring minimal to moderate verbal cues for safety. Patient with presentation of limited B shoulder ROM which patient reports is chronic/unknown per daughter, and currently needs minimal to moderate assist for toileting and dressing. Skilled acute OT to follow. Recommend moderate level intensity; will progress as able to work toward goal for low intensity with family support.     Rehab Prognosis: Fair; patient would benefit from acute skilled OT services to address these deficits and reach maximum level of function.       Plan:     Patient to be seen 4 x/week to address the above listed problems via self-care/home management, therapeutic activities, therapeutic  exercises  Plan of Care Expires: 12/17/24  Plan of Care Reviewed with: patient    Subjective     Patient/Family Comments/goals: To go home    Occupational Profile:  Living Environment: Pt lives with daughter in [facility] with no [place]; WIS with [place].  Previous level of function: Patient with PLOF per patient report and per phone call clarification from daughter, Ashley, whom he resides with of being modified independent in ADLs/ mobility with use of rollator, assist from daughter for IADLs/medication management.  Equipment Used at Home: walker, rolling, shower chair, rollator  Assistance upon Discharge: Daughter    Pain/Comfort:  Pain Rating 1: 0/10  Pain Rating Post-Intervention 1: 0/10      Objective:     Communicated with: nurse prior to session.  Patient found HOB elevated with bed alarm, PureWick, telemetry (Tele sitter) upon OT entry to room.    General Precautions: Standard, fall  Orthopedic Precautions: N/A  Braces: N/A  Respiratory Status: Room air    Occupational Performance:    Bed Mobility:    Patient completed Supine to Sit with stand by assistance and Pt catapults himself OOB  Patient completed Sit to Supine with stand by assistance    Functional Mobility/Transfers:  Patient completed Sit <> Stand Transfer with contact guard assistance  with  no assistive device and rolling walker   Patient completed Toilet Transfer Step Transfer technique with minimum assistance with  rolling walker and minimal verbal cues for eccentric control.  Functional Mobility: In room mobility to/from true bathroom with minimum assistance with rolling walker with moderate verbal cues for safety.     Activities of Daily Living:  Lower Body Dressing: grossly moderate   Toileting: minimum assistance simulated toilet routine     Cognitive/Visual Perceptual:  Cognitive/Psychosocial Skills:     -       Oriented to: Person, Place, and Situation   -       Follows Commands/attention:Follows one-step commands  -       Memory: Impaired STM and Poor  immediate recall  -       Safety awareness/insight to disability: impaired- now has tele sitter    Physical Exam:  Upper Extremity Range of Motion:     -       B Upper Extremity: Deficits: Pt reports chronic but daughter unable to verify; limited to less than 90 degrees with compensation    Strength: B  strength: WFL    [facility] 6 Click ADL:  [facility] Total Score: 19    Treatment & Education:  Patient educated on role of OT/ POC development.   Occupational profile developed.  Patient guided to transition eob for assessment.   Initiated ADL / functional mobility training as above with instruction on using the rolling walker for safety.   Used clear instructions.   Educated patient on importance of out of bed mobility and movement/repositioning throughout the day.   OT student called daughter to verify pt's PLOF and cognition.   Affirmed use of call light.   Answered questions within scope.     Patient left HOB elevated with all lines intact, call button in reach, bed alarm on, and nurse notified    GOALS:   Multidisciplinary Problems       Occupational Therapy Goals          Problem: Occupational Therapy    Goal Priority Disciplines Outcome Interventions   Occupational Therapy Goal     OT, PT/OT Progressing    Description: Goals to be met by: 12/17/2024     Patient will increase functional independence with ADLs by performing:    UE Dressing with Set-up Assistance.  LE Dressing with Supervision.  Grooming while standing at sink with Modified [place].  Toileting from toilet with Modified [place] for hygiene and clothing management.   Functional mobility to / from bathroom inclusive of Toilet transfer to toilet with Modified [place] with use of least restrictive device.  100% reciprocation understanding for DME recommendations and at least 2 techniques for safety during ADL regimen.                         History:     Past Medical History:   Diagnosis Date    Arthritis     Hypertension     Renal  disorder     Stroke        History reviewed. No pertinent surgical history.    Time Tracking:     OT Date of Treatment: 11/19/24  OT Start Time: 1452  OT Stop Time: 1527  OT Total Time (min): 35 min    Billable Minutes: Eval 15  Self Care/Home Management 10  Therapeutic Activity: 10    11/19/2024

## 2024-11-19 NOTE — ASSESSMENT & PLAN NOTE
- K+ 2.5 upon admission up to 2.9 yesterday; 3.3 this AM with Mg 1.8- replacement ordered   - on daily KDur  - goal K+>4.0 Mg >2.0

## 2024-11-19 NOTE — ASSESSMENT & PLAN NOTE
"Grade 2 diastolic heart failure,   Patient has  heart failure that is . On presentation their CHF was . Most recent BNP and echo results are listed below.  No results for input(s): "BNP" in the last 72 hours.  Latest ECHO  Echo    Result Date: 11/18/2024    Left Ventricle: The left ventricle is normal in size. Moderate septal   thickening. There is concentric hypertrophy. There is normal systolic   function. Biplane (2D) method of discs ejection fraction is 64%. Grade II   diastolic dysfunction. Elevated left ventricular filling pressure.    Right Ventricle: Normal right ventricular cavity size. Wall thickness   is normal. Systolic function is normal.    Left Atrium: Left atrium is severely dilated.    Aortic Valve: The aortic valve is a trileaflet valve. There is moderate   aortic valve sclerosis. Mildly restricted motion. There is moderate aortic   regurgitation with an anteromedial eccentrically directed jet.    Pulmonary Artery: The estimated pulmonary artery systolic pressure is   34 mmHg.    IVC/SVC: Normal venous pressure at 3 mmHg.         Current Heart Failure Medications  carvediloL tablet 25 mg, 2 times daily, Oral  hydrALAZINE injection 10 mg, Every 4 hours PRN, Intravenous    Plan  - Monitor strict I&Os and daily weights.    - Place on telemetry  - Low sodium diet  - Place on fluid restriction of .   - Cardiology  consulted-appreciate recs  - The patient's volume status is   - per Cardiology recommendation we will hold Lasix no need for restarting it patient can follow up with Cardiology as outpatient, optimize medical management for grade 2 diastolic heart      "

## 2024-11-19 NOTE — PLAN OF CARE
went to meet with patient on MD rounds. No family at bedside. PT/OT consults placed. PCC and Cardiology follow-up appointments scheduled.  will continue to follow patient through transitions of care and assist with any discharge needs.     3255-- spoke with abelardo Ramirez and reviewed therapy recommendations. She did not have a Home Health preference. [facility]. [facility] sent.    Emergency Contacts    Name Relation Home Work Mobile   Ashley Lane Daughter   [phone]   GILA OLSON Son   [phone]     Future Appointments   Date Time Provider Department Center   11/25/2024 11:00 AM Anna Quinonez MD [facility] [place] [facility]   12/4/2024  2:00 PM Gaurang Cherry MD [facility] CARDIO [place] [facility]         11/19/24 1053   Discharge Reassessment   Assessment Type Discharge Planning Reassessment   Did the patient's condition or plan change since previous assessment? No   Discharge Plan discussed with: Patient   Discharge Plan A Home with family   Discharge Plan B Home with family;Home Health   DME Needed Upon Discharge  none   Transition of Care Barriers None   Why the patient remains in the hospital Requires continued medical care   Post-Acute Status   Hospital Resources/Appts/Education Provided Appointments scheduled and added to AVS   Discharge Delays None known at this time     Kelsi Rodriguez RN    [phone]

## 2024-11-19 NOTE — PLAN OF CARE
Problem: Physical Therapy  Goal: Physical Therapy Goal  Description: Goals to be met by: 24     Patient will increase functional independence with mobility by performin. Supine to sit with Modified [place]  2. Sit to supine with Modified [place]  3. Sit to stand transfer with Modified [place] with use of RW.  4. Bed to chair transfer with Modified [place] using Rolling Walker  5. Gait  x 150 feet with Modified [place] using Rolling Walker.     Outcome: Progressing    PT evaluation completed. Pt's PLOF: MOD I with use of rollator. Pt at this time requires MIN A with bed mobility and CGA with transfers to standing/ambulation with use of RW. Therapy recommending low intensity therapy with family assistance post d/c from hospital. Therapy will continue to progress pt as able.

## 2024-11-19 NOTE — PROGRESS NOTES
[facility] Medicine  Progress Note    Patient Name: Stanley Granados  MRN: [id number]  Patient Class: IP- Inpatient   Admission Date: 11/17/2024  Length of Stay: 1 days  Attending Physician: Phuong Gee MD  Primary Care Provider: Phuong Umaña MD        Subjective:     Principal Problem:<principal problem not specified>        HPI:  84 y.o. male with a past medical history of Arthritis, Hypertension, Renal disorder, and Stroke transferred from [facility] for .   left-sided chest pain that started this morning accompanied by cough and shortness of breath x 2 days duration. CP is non radiating. He states he ran out of his blood pressure pills and has not been taking them. BP was 201/85 on presentation.  After arriving here from [facility], he had fever up to 102.1. No sick contacts.   He does have left-sided residual deficits from a previous stroke.  No leg swelling.  No nausea vomiting or diarrhea.  Trop x 1 - neg.  CXR showed bibasilar airspace disease.    Overview/Hospital Course:  No notes on file    Interval History:  Patient was seen in the morning A&O x3 denies any chest pain no shortness a breath, echo resulted with with a normal ejection fraction grade 2 diastolic heart failure, cardiology recommended.  Lasix, continue on day 2 of antibiotics for pneumonia.      Review of Systems   Constitutional:  Negative for fatigue and fever.   HENT:  Negative for congestion.    Respiratory:  Negative for cough, shortness of breath and wheezing.    Cardiovascular:  Negative for chest pain, palpitations and leg swelling.     Objective:     Vital Signs (Most Recent):  Temp: 98.2 °F (36.8 °C) (11/19/24 1120)  Pulse: 70 (11/19/24 1120)  Resp: 20 (11/19/24 1120)  BP: 126/63 (11/19/24 1120)  SpO2: 95 % (11/19/24 1120) Vital Signs (24h Range):  Temp:  [97.6 °F (36.4 °C)-99.1 °F (37.3 °C)] 98.2 °F (36.8 °C)  Pulse:  [62-82] 70  Resp:  [18-20] 20  SpO2:  [93 %-98 %] 95 %  BP: (126-157)/(63-76) 126/63     Weight: 69.1 kg (152  lb 5.4 oz)  Body mass index is 21.86 kg/m².    Intake/Output Summary (Last 24 hours) at 11/19/2024 1249  Last data filed at 11/19/2024 0903  Gross per 24 hour   Intake 712 ml   Output 800 ml   Net -88 ml         Physical Exam  Constitutional:       Appearance: Normal appearance.   HENT:      Head: Normocephalic and atraumatic.   Cardiovascular:      Rate and Rhythm: Normal rate and regular rhythm.      Pulses: Normal pulses.      Heart sounds: Normal heart sounds. No murmur heard.     No friction rub. No gallop.   Pulmonary:      Effort: No respiratory distress (But decreased at the bases).      Breath sounds: Normal breath sounds.   Abdominal:      General: Abdomen is flat. Bowel sounds are normal.      Palpations: Abdomen is soft. There is no mass.      Tenderness: There is no abdominal tenderness.      Hernia: No hernia is present.   Musculoskeletal:         General: Normal range of motion.      Cervical back: Normal range of motion and neck supple.   Skin:     General: Skin is warm and dry.   Neurological:      General: No focal deficit present.      Mental Status: He is alert and oriented to person, place, and time. Mental status is at baseline.             Significant Labs: All pertinent labs within the past 24 hours have been reviewed.  Recent Lab Results         11/19/24  0323        Anion Gap 7       BUN 25       Calcium 8.1       Chloride 104       CO2 29       Creatinine 1.3       eGFR 55       Glucose 80       Magnesium  1.8       Potassium 3.3       Sodium 140               Significant Imaging: I have reviewed all pertinent imaging results/findings within the past 24 hours.  Echo    Result Date: 11/18/2024    Left Ventricle: The left ventricle is normal in size. Moderate septal   thickening. There is concentric hypertrophy. There is normal systolic   function. Biplane (2D) method of discs ejection fraction is 64%. Grade II   diastolic dysfunction. Elevated left ventricular filling pressure.    Right  Ventricle: Normal right ventricular cavity size. Wall thickness   is normal. Systolic function is normal.    Left Atrium: Left atrium is severely dilated.    Aortic Valve: The aortic valve is a trileaflet valve. There is moderate   aortic valve sclerosis. Mildly restricted motion. There is moderate aortic   regurgitation with an anteromedial eccentrically directed jet.    Pulmonary Artery: The estimated pulmonary artery systolic pressure is   34 mmHg.    IVC/SVC: Normal venous pressure at 3 mmHg.         Assessment/Plan:      Pneumonia of both lungs due to infectious organism  Patient has a diagnosis of pneumonia. The cause of the pneumonia is unknown at this time. The pneumonia is stable. The patient has the following signs/symptoms of pneumonia: cough and shortness of breath. The patient does not have a current oxygen requirement and the patient does not have a home oxygen requirement. I have reviewed the pertinent imaging. The following cultures have been collected:  Blood  The culture results are listed below.     Current antimicrobial regimen consists of the antibiotics listed below. Will monitor patient closely and     Antibiotics (From admission, onward)      Start     Stop Route Frequency Ordered    11/18/24 0900  azithromycin tablet 500 mg         11/21/24 0859 Oral Daily 11/17/24 1812 11/17/24 1915  cefTRIAXone injection 1 g         -- IV Every 24 hours (non-standard times) 11/17/24 1812            Microbiology Results (last 7 days)       Procedure Component Value Units Date/Time    Blood culture [[id number]] Collected: 11/17/24 1957    Order Status: Completed Specimen: Blood from Peripheral, Hand, Left Updated: 11/18/24 0745     Blood Culture, Routine No Growth to date            CXR: Findings suggestive mild interstitial edema.      CT/ abd   Dependent pleural effusions and bibasilar airspace opacity/atelectasis     Hypokalemia  Patient's most recent potassium results are listed below.   Recent Labs  "    11/17/24  0944 11/18/24  0259 11/19/24  0323   K 2.9* 2.5* 3.3*       Plan  - Replete potassium per protocol  - Monitor potassium Daily  - Patient's hypokalemia is stable      Fever    Cause of fever can be secondary to pneumonia    Blood cultures negative  Sent COVID/flu/RSV testing all negative  On CPAP antibiotics responding well    Atherosclerosis of native coronary artery of native heart with angina pectoris  Patient with known CAD s/p stent placement, which is controlled Will continue ASA and Statin and monitor for S/Sx of angina/ACS. Continue to monitor on telemetry.     Diastolic heart failure secondary to hypertension  Grade 2 diastolic heart failure,   Patient has  heart failure that is . On presentation their CHF was . Most recent BNP and echo results are listed below.  No results for input(s): "BNP" in the last 72 hours.  Latest ECHO  Echo    Result Date: 11/18/2024    Left Ventricle: The left ventricle is normal in size. Moderate septal   thickening. There is concentric hypertrophy. There is normal systolic   function. Biplane (2D) method of discs ejection fraction is 64%. Grade II   diastolic dysfunction. Elevated left ventricular filling pressure.    Right Ventricle: Normal right ventricular cavity size. Wall thickness   is normal. Systolic function is normal.    Left Atrium: Left atrium is severely dilated.    Aortic Valve: The aortic valve is a trileaflet valve. There is moderate   aortic valve sclerosis. Mildly restricted motion. There is moderate aortic   regurgitation with an anteromedial eccentrically directed jet.    Pulmonary Artery: The estimated pulmonary artery systolic pressure is   34 mmHg.    IVC/SVC: Normal venous pressure at 3 mmHg.         Current Heart Failure Medications  carvediloL tablet 25 mg, 2 times daily, Oral  hydrALAZINE injection 10 mg, Every 4 hours PRN, Intravenous    Plan  - Monitor strict I&Os and daily weights.    - Place on telemetry  - Low sodium diet  - Place on " fluid restriction of .   - Cardiology  consulted-appreciate recs  - The patient's volume status is   - per Cardiology recommendation we will hold Lasix no need for restarting it patient can follow up with Cardiology as outpatient, optimize medical management for grade 2 diastolic heart        Hypertension  Patients blood pressure range in the last 24 hours was: BP  Min: 126/63  Max: 157/72.The patient's inpatient anti-hypertensive regimen is listed below:  Current Antihypertensives  amLODIPine tablet 5 mg, Daily, Oral  carvediloL tablet 25 mg, 2 times daily, Oral  hydrALAZINE injection 10 mg, Every 4 hours PRN, Intravenous  ranolazine 12 hr tablet 1,000 mg, 2 times daily, Oral    Plan  - BP is uncontrolled, will adjust as follows:  Resume home blood pressure medicines  -  plans as above    Stage 3b chronic kidney disease  Creatine stable for now. BMP reviewed- noted Estimated Creatinine Clearance: 42.8 mL/min (based on SCr of 1.3 mg/dL). according to latest data. Based on current GFR, CKD stage is stage 3 - GFR 30-59.  Monitor UOP and serial BMP and adjust therapy as needed. Renally dose meds. Avoid nephrotoxic medications and procedures.      VTE Risk Mitigation (From admission, onward)           Ordered     enoxaparin injection 40 mg  Daily         11/17/24 1812     IP VTE HIGH RISK PATIENT  Once         11/17/24 1812     Place sequential compression device  Until discontinued         11/17/24 1812                    Discharge Planning   VERONICA: 11/20/2024     Code Status: Full Code   Is the patient medically ready for discharge?:     Reason for patient still in hospital :  Treatment  Discharge Plan A: Home with family   Discharge Delays: None known at this time              Phuong Salguero MD  Department of Hospital Medicine   [facility]

## 2024-11-19 NOTE — SUBJECTIVE & OBJECTIVE
Interval History:  Patient was seen in the morning A&O x3 denies any chest pain no shortness a breath, echo resulted with with a normal ejection fraction grade 2 diastolic heart failure, cardiology recommended.  Lasix, continue on day 2 of antibiotics for pneumonia.      Review of Systems   Constitutional:  Negative for fatigue and fever.   HENT:  Negative for congestion.    Respiratory:  Negative for cough, shortness of breath and wheezing.    Cardiovascular:  Negative for chest pain, palpitations and leg swelling.     Objective:     Vital Signs (Most Recent):  Temp: 98.2 °F (36.8 °C) (11/19/24 1120)  Pulse: 70 (11/19/24 1120)  Resp: 20 (11/19/24 1120)  BP: 126/63 (11/19/24 1120)  SpO2: 95 % (11/19/24 1120) Vital Signs (24h Range):  Temp:  [97.6 °F (36.4 °C)-99.1 °F (37.3 °C)] 98.2 °F (36.8 °C)  Pulse:  [62-82] 70  Resp:  [18-20] 20  SpO2:  [93 %-98 %] 95 %  BP: (126-157)/(63-76) 126/63     Weight: 69.1 kg (152 lb 5.4 oz)  Body mass index is 21.86 kg/m².    Intake/Output Summary (Last 24 hours) at 11/19/2024 1249  Last data filed at 11/19/2024 0903  Gross per 24 hour   Intake 712 ml   Output 800 ml   Net -88 ml         Physical Exam  Constitutional:       Appearance: Normal appearance.   HENT:      Head: Normocephalic and atraumatic.   Cardiovascular:      Rate and Rhythm: Normal rate and regular rhythm.      Pulses: Normal pulses.      Heart sounds: Normal heart sounds. No murmur heard.     No friction rub. No gallop.   Pulmonary:      Effort: No respiratory distress (But decreased at the bases).      Breath sounds: Normal breath sounds.   Abdominal:      General: Abdomen is flat. Bowel sounds are normal.      Palpations: Abdomen is soft. There is no mass.      Tenderness: There is no abdominal tenderness.      Hernia: No hernia is present.   Musculoskeletal:         General: Normal range of motion.      Cervical back: Normal range of motion and neck supple.   Skin:     General: Skin is warm and dry.    Neurological:      General: No focal deficit present.      Mental Status: He is alert and oriented to person, place, and time. Mental status is at baseline.             Significant Labs: All pertinent labs within the past 24 hours have been reviewed.  Recent Lab Results         11/19/24  0323        Anion Gap 7       BUN 25       Calcium 8.1       Chloride 104       CO2 29       Creatinine 1.3       eGFR 55       Glucose 80       Magnesium  1.8       Potassium 3.3       Sodium 140               Significant Imaging: I have reviewed all pertinent imaging results/findings within the past 24 hours.  Echo    Result Date: 11/18/2024    Left Ventricle: The left ventricle is normal in size. Moderate septal   thickening. There is concentric hypertrophy. There is normal systolic   function. Biplane (2D) method of discs ejection fraction is 64%. Grade II   diastolic dysfunction. Elevated left ventricular filling pressure.    Right Ventricle: Normal right ventricular cavity size. Wall thickness   is normal. Systolic function is normal.    Left Atrium: Left atrium is severely dilated.    Aortic Valve: The aortic valve is a trileaflet valve. There is moderate   aortic valve sclerosis. Mildly restricted motion. There is moderate aortic   regurgitation with an anteromedial eccentrically directed jet.    Pulmonary Artery: The estimated pulmonary artery systolic pressure is   34 mmHg.    IVC/SVC: Normal venous pressure at 3 mmHg.

## 2024-11-19 NOTE — SUBJECTIVE & OBJECTIVE
Review of Systems   Constitutional: Negative for chills, decreased appetite, diaphoresis, fever, malaise/fatigue, weight gain and weight loss.   Cardiovascular:  Negative for chest pain, claudication, dyspnea on exertion, irregular heartbeat, leg swelling, near-syncope, orthopnea, palpitations and paroxysmal nocturnal dyspnea.   Respiratory:  Negative for cough, shortness of breath, snoring, sputum production and wheezing.    Endocrine: Negative for cold intolerance, heat intolerance, polydipsia, polyphagia and polyuria.   Skin:  Negative for color change, dry skin, itching, nail changes and poor wound healing.   Musculoskeletal:  Negative for back pain, gout, joint pain and joint swelling.   Gastrointestinal:  Negative for bloating, abdominal pain, constipation, diarrhea, hematemesis, hematochezia, melena, nausea and vomiting.   Genitourinary:  Negative for dysuria, hematuria and nocturia.   Neurological:  Negative for dizziness, headaches, light-headedness, numbness, paresthesias and weakness.   Psychiatric/Behavioral:  Negative for altered mental status, depression and memory loss.      Objective:     Vital Signs (Most Recent):  Temp: 98.4 °F (36.9 °C) (11/19/24 0743)  Pulse: 69 (11/19/24 0743)  Resp: 19 (11/19/24 0434)  BP: (!) 154/76 (11/19/24 0743)  SpO2: 96 % (11/19/24 0743) Vital Signs (24h Range):  Temp:  [97.6 °F (36.4 °C)-99.1 °F (37.3 °C)] 98.4 °F (36.9 °C)  Pulse:  [66-82] 69  Resp:  [18-19] 19  SpO2:  [93 %-98 %] 96 %  BP: (136-157)/(64-76) 154/76     Weight: 69.1 kg (152 lb 5.4 oz)  Body mass index is 21.86 kg/m².     SpO2: 96 %         Intake/Output Summary (Last 24 hours) at 11/19/2024 0812  Last data filed at 11/18/2024 1834  Gross per 24 hour   Intake 360 ml   Output 800 ml   Net -440 ml       Lines/Drains/Airways       Peripheral Intravenous Line  Duration                  Peripheral IV - Single Lumen 11/17/24 1000 20 G Right Antecubital 1 day         Peripheral IV - Single Lumen 11/17/24  1127 20 G Anterior;Left Forearm 1 day                       Physical Exam  Constitutional:       General: He is not in acute distress.     Appearance: He is well-developed.   Neck:      Vascular: No JVD.   Cardiovascular:      Rate and Rhythm: Normal rate and regular rhythm.      Heart sounds: No murmur heard.     No gallop.   Pulmonary:      Effort: Pulmonary effort is normal. No respiratory distress.      Breath sounds: Normal breath sounds. No wheezing.   Abdominal:      General: Bowel sounds are normal. There is no distension.      Palpations: Abdomen is soft.      Tenderness: There is no abdominal tenderness.   Musculoskeletal:      Cervical back: Normal range of motion and neck supple.   Skin:     General: Skin is warm and dry.   Neurological:      Mental Status: He is alert.      Comments: Disoriented    Psychiatric:         Behavior: Behavior normal.         Thought Content: Thought content normal.         Judgment: Judgment normal.            Significant Labs: BMP:   Recent Labs   Lab 11/17/24  0944 11/18/24  0259 11/19/24  0323   * 94 80    143 140   K 2.9* 2.5* 3.3*    105 104   CO2 29 26 29   BUN 15 16 25*   CREATININE 0.85 1.2 1.3   CALCIUM 8.0* 8.1* 8.1*   MG 1.7 1.5* 1.8    and CBC   Recent Labs   Lab 11/17/24  0944   WBC 5.97   HGB 8.6*   HCT 26.7*          Significant Imaging: Echocardiogram: Transthoracic echo (TTE) complete (Cupid Only):   Results for orders placed or performed during the hospital encounter of 11/17/24   Echo   Result Value Ref Range    BSA 1.85 m2    Quach's Biplane MOD Ejection Fraction 64 %    A2C EF 65 %    A4C EF 71 %    LVOT stroke volume 207.3 cm3    LVIDd 4.9 3.5 - 6.0 cm    LV Systolic Volume 44.06 mL    LV Systolic Volume Index 23.7 mL/m2    LVIDs 3.3 2.1 - 4.0 cm    LV ESV A2C 126.41 mL    LV Diastolic Volume 113.17 mL    LV ESV A4C 124.00 mL    LV Diastolic Volume Index 60.84 mL/m2    LV EDV A2C 69.880970986584453 mL    LV EDV A4C 60.98 mL     Left Ventricular End Systolic Volume by Teichholz Method 44.06 mL    Left Ventricular End Diastolic Volume by Teichholz Method 113.17 mL    IVS 1.7 (A) 0.6 - 1.1 cm    LVOT diameter 2.9 cm    LVOT area 6.6 cm2    FS 32.7 28 - 44 %    Left Ventricle Relative Wall Thickness 0.73 cm    PW 1.8 (A) 0.6 - 1.1 cm    LV mass 395.8 g    LV Mass Index 212.8 g/m2    MV Peak E Bill 1.01 m/s    TDI LATERAL 0.06 m/s    TDI SEPTAL 0.06 m/s    E/E' ratio 16.83 m/s    MV Peak A Bill 0.76 m/s    TR Max Bill 2.79 m/s    E/A ratio 1.33     E wave deceleration time 254.79 msec    LV SEPTAL E/E' RATIO 16.83 m/s    LV LATERAL E/E' RATIO 16.83 m/s    PV Peak S Bill 0.68 m/s    PV Peak D Bill 0.42 m/s    Pulm vein S/D ratio 1.62     LVOT peak bill 1.3 m/s    Left Ventricular Outflow Tract Mean Velocity 1.03 cm/s    Left Ventricular Outflow Tract Mean Gradient 4.55 mmHg    RV- torres basal diam 3.9 cm    RV S' 12.41 cm/s    TAPSE 2.19 cm    RV/LV Ratio 0.80 cm    LA Vol (MOD) 131.30 mL    SLOANE (MOD) 70.6 mL/m2    RA area length vol 65.33 mL    RA Area 22.0 cm2    RA Vol 68.34 mL    AV regurgitation pressure 1/2 time [id number] ms    AR Max Bill 4.11 m/s    AV mean gradient 9.4 mmHg    AV peak gradient 13.0 mmHg    Ao peak bill 1.8 m/s    Ao VTI 41.9 cm    LVOT peak VTI 31.4 cm    AV valve area 4.9 cm²    AV Velocity Ratio 0.72     AV index (prosthetic) 0.75     GIANA by Velocity Ratio 4.8 cm²    Mr max bill 4.93 m/s    MV mean gradient 2 mmHg    MV peak gradient 4 mmHg    MV valve area by continuity eq 6.56 cm2    MV VTI 31.6 cm    Triscuspid Valve Regurgitation Peak Gradient 31 mmHg    PV PEAK VELOCITY 0.91 m/s    PV peak gradient 3 mmHg    Pulmonary Valve Mean Velocity 0.66 m/s    Sinus 3.64 cm    STJ 2.52 cm    Ascending aorta 3.21 cm    IVC diameter 1.29 cm    Mean e' 0.06 m/s    ZLVIDS 0.26     ZLVIDD -0.55     LA area A4C 36.09 cm2    LA area A2C 33.69 cm2    TV resting pulmonary artery pressure 34 mmHg    RV TB RVSP 6 mmHg    Est. RA  pres 3 mmHg    Narrative      Left Ventricle: The left ventricle is normal in size. Moderate septal   thickening. There is concentric hypertrophy. There is normal systolic   function. Biplane (2D) method of discs ejection fraction is 64%. Grade II   diastolic dysfunction. Elevated left ventricular filling pressure.    Right Ventricle: Normal right ventricular cavity size. Wall thickness   is normal. Systolic function is normal.    Left Atrium: Left atrium is severely dilated.    Aortic Valve: The aortic valve is a trileaflet valve. There is moderate   aortic valve sclerosis. Mildly restricted motion. There is moderate aortic   regurgitation with an anteromedial eccentrically directed jet.    Pulmonary Artery: The estimated pulmonary artery systolic pressure is   34 mmHg.    IVC/SVC: Normal venous pressure at 3 mmHg.

## 2024-11-19 NOTE — ASSESSMENT & PLAN NOTE
- related to diastolic etiology  - presented with chest pain and SOB in setting of uncontrolled HTN  - NTpro BNP 8320; initially on IV Lasix 40mg BID with down titration to 20mg IV BID yesterday; 800cc out overnight; negative 1.8L since admission  SOB improved; recommend stopping Lasix and do not anticipate daily dosing needed as an outpatient   - echo with EF 64% and grade II diastolic dysfunction   - SOB improved and likely  all related to uncontrolled HTN

## 2024-11-19 NOTE — ASSESSMENT & PLAN NOTE
Cause of fever can be secondary to pneumonia    Blood cultures negative  Sent COVID/flu/RSV testing all negative  On CPAP antibiotics responding well

## 2024-11-19 NOTE — ASSESSMENT & PLAN NOTE
Creatine stable for now. BMP reviewed- noted Estimated Creatinine Clearance: 42.8 mL/min (based on SCr of 1.3 mg/dL). according to latest data. Based on current GFR, CKD stage is stage 3 - GFR 30-59.  Monitor UOP and serial BMP and adjust therapy as needed. Renally dose meds. Avoid nephrotoxic medications and procedures.

## 2024-11-19 NOTE — ASSESSMENT & PLAN NOTE
- history of CAD with RCA  with collateral from [facility] in 2014  - followed by [facility]; on BB, Norvasc, statin and Ranexa as an outpatient  - BP elevated upon admission but better controlled currently; continue home medication regimen; will resume Ranexa

## 2024-11-19 NOTE — PLAN OF CARE
Problem: Adult Inpatient Plan of Care  Goal: Plan of Care Review  Outcome: Progressing     Problem: Fever  Goal: Body Temperature in Desired Range  Outcome: Progressing

## 2024-11-19 NOTE — ASSESSMENT & PLAN NOTE
Patient's most recent potassium results are listed below.   Recent Labs     11/17/24  0944 11/18/24  0259 11/19/24  0323   K 2.9* 2.5* 3.3*       Plan  - Replete potassium per protocol  - Monitor potassium Daily  - Patient's hypokalemia is stable

## 2024-11-19 NOTE — ASSESSMENT & PLAN NOTE
Patients blood pressure range in the last 24 hours was: BP  Min: 126/63  Max: 157/72.The patient's inpatient anti-hypertensive regimen is listed below:  Current Antihypertensives  amLODIPine tablet 5 mg, Daily, Oral  carvediloL tablet 25 mg, 2 times daily, Oral  hydrALAZINE injection 10 mg, Every 4 hours PRN, Intravenous  ranolazine 12 hr tablet 1,000 mg, 2 times daily, Oral    Plan  - BP is uncontrolled, will adjust as follows:  Resume home blood pressure medicines  -  plans as above

## 2024-11-19 NOTE — PLAN OF CARE
Problem: Adult Inpatient Plan of Care  Goal: Plan of Care Review  Outcome: Progressing  Goal: Patient-Specific Goal (Individualized)  Outcome: Progressing  Goal: Absence of Hospital-Acquired Illness or Injury  Outcome: Progressing  Goal: Optimal Comfort and Wellbeing  Outcome: Progressing  Goal: Readiness for Transition of Care  Outcome: Progressing     Problem: Chest Pain  Goal: Resolution of Chest Pain Symptoms  Outcome: Progressing     Problem: Airway Clearance Ineffective  Goal: Effective Airway Clearance  Outcome: Progressing     Problem: Fever  Goal: Body Temperature in Desired Range  Outcome: Progressing     Problem: Pain Acute  Goal: Optimal Pain Control and Function  Outcome: Progressing     Problem: Fall Injury Risk  Goal: Absence of Fall and Fall-Related Injury  Outcome: Progressing     Problem: Comorbidity Management  Goal: Blood Pressure in Desired Range  Outcome: Progressing     Problem: Pneumonia  Goal: Fluid Balance  Outcome: Progressing  Goal: Resolution of Infection Signs and Symptoms  Outcome: Progressing  Goal: Effective Oxygenation and Ventilation  Outcome: Progressing     Problem: Activity Intolerance  Goal: Enhanced Capacity and Energy  Outcome: Progressing     Problem: Fatigue  Goal: Improved Activity Tolerance  Outcome: Progressing     Problem: Skin Injury Risk Increased  Goal: Skin Health and Integrity  Outcome: Progressing

## 2024-11-19 NOTE — ASSESSMENT & PLAN NOTE
- /85 upon admission  - on Amlodipine, Coreg as an outpatient- out of Amlodipine x few weeks  - home medication resumed; SBP down to 130s-150s overnight  - BP fairly well controlled; continue current medication regimen  - monitor BP and adjust meds prn

## 2024-11-20 VITALS
OXYGEN SATURATION: 98 % | RESPIRATION RATE: 17 BRPM | DIASTOLIC BLOOD PRESSURE: 75 MMHG | BODY MASS INDEX: 21.81 KG/M2 | HEIGHT: 70 IN | HEART RATE: 65 BPM | WEIGHT: 152.31 LBS | SYSTOLIC BLOOD PRESSURE: 171 MMHG | TEMPERATURE: 98 F

## 2024-11-20 LAB
ANION GAP SERPL CALC-SCNC: 8 MMOL/L (ref 8–16)
BUN SERPL-MCNC: 22 MG/DL (ref 8–23)
CALCIUM SERPL-MCNC: 8.5 MG/DL (ref 8.7–10.5)
CHLORIDE SERPL-SCNC: 106 MMOL/L (ref 95–110)
CO2 SERPL-SCNC: 26 MMOL/L (ref 23–29)
CREAT SERPL-MCNC: 1.3 MG/DL (ref 0.5–1.4)
EST. GFR  (NO RACE VARIABLE): 55 ML/MIN/1.73 M^2
GLUCOSE SERPL-MCNC: 83 MG/DL (ref 70–110)
MAGNESIUM SERPL-MCNC: 2 MG/DL (ref 1.6–2.6)
POTASSIUM SERPL-SCNC: 4.4 MMOL/L (ref 3.5–5.1)
SODIUM SERPL-SCNC: 140 MMOL/L (ref 136–145)

## 2024-11-20 PROCEDURE — 97535 SELF CARE MNGMENT TRAINING: CPT

## 2024-11-20 PROCEDURE — G0009 ADMIN PNEUMOCOCCAL VACCINE: HCPCS | Performed by: INTERNAL MEDICINE

## 2024-11-20 PROCEDURE — 90677 PCV20 VACCINE IM: CPT | Performed by: INTERNAL MEDICINE

## 2024-11-20 PROCEDURE — 3E0234Z INTRODUCTION OF SERUM, TOXOID AND VACCINE INTO MUSCLE, PERCUTANEOUS APPROACH: ICD-10-PCS

## 2024-11-20 PROCEDURE — 90653 IIV ADJUVANT VACCINE IM: CPT | Performed by: INTERNAL MEDICINE

## 2024-11-20 PROCEDURE — 3E02340 INTRODUCTION OF INFLUENZA VACCINE INTO MUSCLE, PERCUTANEOUS APPROACH: ICD-10-PCS

## 2024-11-20 PROCEDURE — 90471 IMMUNIZATION ADMIN: CPT | Performed by: INTERNAL MEDICINE

## 2024-11-20 PROCEDURE — G0008 ADMIN INFLUENZA VIRUS VAC: HCPCS | Performed by: INTERNAL MEDICINE

## 2024-11-20 PROCEDURE — 80048 BASIC METABOLIC PNL TOTAL CA: CPT | Performed by: INTERNAL MEDICINE

## 2024-11-20 PROCEDURE — 97530 THERAPEUTIC ACTIVITIES: CPT

## 2024-11-20 PROCEDURE — 25000003 PHARM REV CODE 250: Performed by: INTERNAL MEDICINE

## 2024-11-20 PROCEDURE — 83735 ASSAY OF MAGNESIUM: CPT | Performed by: INTERNAL MEDICINE

## 2024-11-20 PROCEDURE — 90472 IMMUNIZATION ADMIN EACH ADD: CPT | Performed by: INTERNAL MEDICINE

## 2024-11-20 PROCEDURE — 25000003 PHARM REV CODE 250: Performed by: NURSE PRACTITIONER

## 2024-11-20 PROCEDURE — 63600175 PHARM REV CODE 636 W HCPCS: Performed by: INTERNAL MEDICINE

## 2024-11-20 PROCEDURE — 97116 GAIT TRAINING THERAPY: CPT

## 2024-11-20 PROCEDURE — 63700000 PHARM REV CODE 250 ALT 637 W/O HCPCS: Performed by: INTERNAL MEDICINE

## 2024-11-20 PROCEDURE — 36415 COLL VENOUS BLD VENIPUNCTURE: CPT | Performed by: INTERNAL MEDICINE

## 2024-11-20 RX ORDER — CARVEDILOL 25 MG/1
25 TABLET ORAL 2 TIMES DAILY
Qty: 60 TABLET | Refills: 0 | Status: SHIPPED | OUTPATIENT
Start: 2024-11-20 | End: 2024-11-25 | Stop reason: SDUPTHER

## 2024-11-20 RX ORDER — NITROGLYCERIN 0.4 MG/1
0.4 TABLET SUBLINGUAL EVERY 5 MIN PRN
Qty: 25 TABLET | Refills: 3 | Status: SHIPPED | OUTPATIENT
Start: 2024-11-20

## 2024-11-20 RX ORDER — RANOLAZINE 1000 MG/1
1000 TABLET, EXTENDED RELEASE ORAL 2 TIMES DAILY
Qty: 60 TABLET | Refills: 0 | Status: SHIPPED | OUTPATIENT
Start: 2024-11-20 | End: 2024-11-25 | Stop reason: SDUPTHER

## 2024-11-20 RX ORDER — ATORVASTATIN CALCIUM 40 MG/1
40 TABLET, FILM COATED ORAL DAILY
Qty: 30 TABLET | Refills: 0 | Status: SHIPPED | OUTPATIENT
Start: 2024-11-20 | End: 2024-11-25 | Stop reason: SDUPTHER

## 2024-11-20 RX ORDER — LEVOFLOXACIN 500 MG/1
500 TABLET, FILM COATED ORAL DAILY
Qty: 3 TABLET | Refills: 0 | Status: SHIPPED | OUTPATIENT
Start: 2024-11-21 | End: 2024-11-20

## 2024-11-20 RX ORDER — SODIUM BICARBONATE 650 MG/1
650 TABLET ORAL 2 TIMES DAILY
Qty: 60 TABLET | Refills: 0 | Status: SHIPPED | OUTPATIENT
Start: 2024-11-20 | End: 2024-11-25 | Stop reason: SDUPTHER

## 2024-11-20 RX ORDER — MINERAL OIL
180 ENEMA (ML) RECTAL DAILY
Qty: 30 TABLET | Refills: 0 | Status: SHIPPED | OUTPATIENT
Start: 2024-11-20 | End: 2025-11-20

## 2024-11-20 RX ORDER — LEVOFLOXACIN 500 MG/1
500 TABLET, FILM COATED ORAL DAILY
Status: DISCONTINUED | OUTPATIENT
Start: 2024-11-21 | End: 2024-11-20 | Stop reason: HOSPADM

## 2024-11-20 RX ORDER — AMLODIPINE BESYLATE 5 MG/1
5 TABLET ORAL DAILY
Qty: 30 TABLET | Refills: 0 | Status: SHIPPED | OUTPATIENT
Start: 2024-11-20 | End: 2024-11-25 | Stop reason: SDUPTHER

## 2024-11-20 RX ORDER — FERROUS GLUCONATE 324(38)MG
1 TABLET ORAL EVERY MORNING
Qty: 30 TABLET | Refills: 0 | Status: SHIPPED | OUTPATIENT
Start: 2024-11-20 | End: 2024-11-25 | Stop reason: SDUPTHER

## 2024-11-20 RX ORDER — POTASSIUM CHLORIDE 750 MG/1
10 TABLET, EXTENDED RELEASE ORAL DAILY
Qty: 30 TABLET | Refills: 0 | Status: SHIPPED | OUTPATIENT
Start: 2024-11-20 | End: 2024-12-20

## 2024-11-20 RX ORDER — LEVOFLOXACIN 500 MG/1
500 TABLET, FILM COATED ORAL DAILY
Qty: 3 TABLET | Refills: 0 | Status: SHIPPED | OUTPATIENT
Start: 2024-11-21 | End: 2024-11-25

## 2024-11-20 RX ORDER — LANOLIN ALCOHOL/MO/W.PET/CERES
1 CREAM (GRAM) TOPICAL DAILY
Qty: 30 TABLET | Refills: 3 | Status: SHIPPED | OUTPATIENT
Start: 2024-11-20 | End: 2024-11-25 | Stop reason: SDUPTHER

## 2024-11-20 RX ADMIN — PNEUMOCOCCAL 20-VALENT CONJUGATE VACCINE 0.5 ML
2.2; 2.2; 2.2; 2.2; 2.2; 2.2; 2.2; 2.2; 2.2; 2.2; 2.2; 2.2; 2.2; 2.2; 2.2; 2.2; 4.4; 2.2; 2.2; 2.2 INJECTION, SUSPENSION INTRAMUSCULAR at 12:11

## 2024-11-20 RX ADMIN — POTASSIUM CHLORIDE 10 MEQ: 750 TABLET, EXTENDED RELEASE ORAL at 08:11

## 2024-11-20 RX ADMIN — MAGNESIUM OXIDE TAB 400 MG (241.3 MG ELEMENTAL MG) 400 MG: 400 (241.3 MG) TAB at 08:11

## 2024-11-20 RX ADMIN — AMLODIPINE BESYLATE 5 MG: 5 TABLET ORAL at 08:11

## 2024-11-20 RX ADMIN — CARVEDILOL 25 MG: 25 TABLET, FILM COATED ORAL at 08:11

## 2024-11-20 RX ADMIN — RANOLAZINE 1000 MG: 500 TABLET, FILM COATED, EXTENDED RELEASE ORAL at 08:11

## 2024-11-20 RX ADMIN — CETIRIZINE HYDROCHLORIDE 10 MG: 10 TABLET, FILM COATED ORAL at 08:11

## 2024-11-20 RX ADMIN — Medication 324 MG: at 08:11

## 2024-11-20 RX ADMIN — SODIUM BICARBONATE 650 MG TABLET 650 MG: at 08:11

## 2024-11-20 RX ADMIN — AZITHROMYCIN DIHYDRATE 500 MG: 250 TABLET ORAL at 08:11

## 2024-11-20 RX ADMIN — INFLUENZA A VIRUS A/VICTORIA/4897/2022 IVR-238 (H1N1) ANTIGEN (FORMALDEHYDE INACTIVATED), INFLUENZA A VIRUS A/THAILAND/8/2022 IVR-237 (H3N2) ANTIGEN (FORMALDEHYDE INACTIVATED), INFLUENZA B VIRUS B/AUSTRIA/1359417/2021 BVR-26 ANTIGEN (FORMALDEHYDE INACTIVATED) 0.5 ML: 15; 15; 15 INJECTION, SUSPENSION INTRAMUSCULAR at 12:11

## 2024-11-20 NOTE — DISCHARGE SUMMARY
[facility] Medicine  Discharge Summary      Patient Name: Stanley Granados  MRN: [id number]  LAURYN: [id number]  Patient Class: IP- Inpatient  Admission Date: 11/17/2024  Hospital Length of Stay: 2 days  Discharge Date and Time:  11/20/2024 8:37 AM  Attending Physician: Phuong Gee MD   Discharging Provider: Phuong Salguero MD  Primary Care Provider: Phuong Umaña MD    Primary Care Team: Networked reference to record PCT     HPI:   84 y.o. male with a past medical history of Arthritis, Hypertension, Renal disorder, and Stroke transferred from [facility] for .   left-sided chest pain that started this morning accompanied by cough and shortness of breath x 2 days duration. CP is non radiating. He states he ran out of his blood pressure pills and has not been taking them. BP was 201/85 on presentation.  After arriving here from [facility], he had fever up to 102.1. No sick contacts.   He does have left-sided residual deficits from a previous stroke.  No leg swelling.  No nausea vomiting or diarrhea.  Trop x 1 - neg.  CXR showed bibasilar airspace disease.    * No surgery found *      Hospital Course:  Patient presented with SOB, with pneumonia and hypertension, echo showed grade 2 diastolic heart preserved ejection cardiology was consulted,.  Lasix before discharge patient euvolemic patient to be discharged on levofloxacin for pneumonia.  Patient to follow up with PCP 3 days post discharge  Goals of Care Treatment Preferences:  Code Status: Full Code      SDOH Screening:  The patient declined to be screened for utility difficulties, food insecurity, transport difficulties, housing insecurity, and interpersonal safety, so no concerns could be identified this admission.     Consults:   Consults (From admission, onward)          Status Ordering Provider     Inpatient consult to [facility]  Once        Provider:  (Not yet assigned)    Completed MATHEW MARROQUIN            Pulmonary  * Pneumonia of both lungs due  to infectious organism  Patient has a diagnosis of pneumonia. The cause of the pneumonia is unknown at this time. The pneumonia is stable. The patient has the following signs/symptoms of pneumonia: cough and shortness of breath. The patient does not have a current oxygen requirement and the patient does not have a home oxygen requirement. I have reviewed the pertinent imaging. The following cultures have been collected:  Blood  The culture results are listed below.     Current antimicrobial regimen consists of the antibiotics listed below. Will monitor patient closely and     Antibiotics (From admission, onward)      Start     Stop Route Frequency Ordered    11/21/24 0900  levoFLOXacin tablet 500 mg         11/24/24 0859 Oral Daily 11/20/24 0830    11/17/24 1915  cefTRIAXone injection 1 g         11/20/24 2359 IV Every 24 hours (non-standard times) 11/17/24 1812            Microbiology Results (last 7 days)       Procedure Component Value Units Date/Time    Blood culture [[id number]] Collected: 11/17/24 1957    Order Status: Completed Specimen: Blood from Peripheral, Hand, Left Updated: 11/20/24 0613     Blood Culture, Routine No Growth to date      No Growth to date      No Growth to date            CXR: Findings suggestive mild interstitial edema.      CT/ abd   Dependent pleural effusions and bibasilar airspace opacity/atelectasis   -pt received 3 days of azithromycine and 4 days of rocephen with significant improvement , pt will be dc on levofloxacine of total of 3 days (7 days course of abx).    Cardiac/Vascular  Elevated troponin    2/2 to high demand ischemia due to PNA and HTN.      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    Significant Diagnostic Studies: Labs: BMP:   Recent Labs   Lab 11/19/24  0323 11/20/24  0420   GLU 80 83    140   K 3.3* 4.4    106   CO2 29 26   BUN 25* 22   CREATININE 1.3 1.3   CALCIUM 8.1* 8.5*   MG 1.8 2.0    and CBC No results for input(s): "WBC", "HGB", "HCT", "PLT" in the last 48 hours.    Pending Diagnostic Studies:       Procedure Component Value Units Date/Time    EKG 12-lead [[id number]]     Order Status: Sent Lab Status: No result            Medications:  Reconciled Home Medications:      Medication List        START taking these medications      levoFLOXacin 500 MG tablet  Commonly known as: LEVAQUIN  Take 1 tablet (500 mg total) by mouth once daily. for 3 days  Start taking on: November 21, 2024            CONTINUE taking these medications      amLODIPine 5 MG tablet  Commonly known as: NORVASC  Take 5 mg by mouth.     atorvastatin 40 MG tablet  Commonly known as: LIPITOR  Take 40 mg by mouth.     carvediloL 25 MG tablet  Commonly known as: COREG  Take 25 mg by mouth 2 (two) times daily.     ferrous gluconate 324 MG tablet  Commonly known as: FERGON  Take 1 tablet by mouth every morning.     fexofenadine 180 MG tablet  Commonly known as: ALLEGRA  Take 1 tablet (180 mg total) by mouth once daily.     magnesium oxide 400 mg (241.3 mg magnesium) tablet  Commonly known as: MAG-OX  Take 1 tablet by mouth once daily.     nitroGLYCERIN 0.4 MG SL tablet  Commonly known as: NITROSTAT  Place 0.4 mg under the tongue.     potassium chloride 10 MEQ Tbsr  Commonly known as: KLOR-CON  Take 10 mEq by " mouth.     ranolazine 1,000 mg Tb12  Commonly known as: RANEXA  Take 1,000 mg by mouth 2 (two) times daily.     sodium bicarbonate 650 MG tablet  Take 650 mg by mouth 2 (two) times daily.              Indwelling Lines/Drains at time of discharge:   Lines/Drains/Airways       None                   Time spent on the discharge of patient: 45 minutes         Phuong Salguero MD  Department of Hospital Medicine  [facility]

## 2024-11-20 NOTE — ASSESSMENT & PLAN NOTE
Patient has a diagnosis of pneumonia. The cause of the pneumonia is unknown at this time. The pneumonia is stable. The patient has the following signs/symptoms of pneumonia: cough and shortness of breath. The patient does not have a current oxygen requirement and the patient does not have a home oxygen requirement. I have reviewed the pertinent imaging. The following cultures have been collected:  Blood  The culture results are listed below.     Current antimicrobial regimen consists of the antibiotics listed below. Will monitor patient closely and     Antibiotics (From admission, onward)      Start     Stop Route Frequency Ordered    11/21/24 0900  levoFLOXacin tablet 500 mg         11/24/24 0859 Oral Daily 11/20/24 0830    11/17/24 1915  cefTRIAXone injection 1 g         11/20/24 2359 IV Every 24 hours (non-standard times) 11/17/24 1812            Microbiology Results (last 7 days)       Procedure Component Value Units Date/Time    Blood culture [[id number]] Collected: 11/17/24 1957    Order Status: Completed Specimen: Blood from Peripheral, Hand, Left Updated: 11/20/24 0613     Blood Culture, Routine No Growth to date      No Growth to date      No Growth to date            CXR: Findings suggestive mild interstitial edema.      CT/ abd   Dependent pleural effusions and bibasilar airspace opacity/atelectasis   -pt received 3 days of azithromycine and 4 days of rocephen with significant improvement , pt will be dc on levofloxacine of total of 3 days (7 days course of abx).

## 2024-11-20 NOTE — PLAN OF CARE
11/20/24 1358   AVS Confirmation   Discharge instructions and AVS provided to and reviewed with patient and/or significant other. Yes       AVS printed and handed to patient by bedside nurse. VN reviewed discharge instructions with patient and daughter Ashley using teachback method.  Allowed time for questions, all questions answered.  Patient's daughter verbalized complete understanding of discharge instructions. [facility] [place]  Secure chat sent to Dr Gee; being addressed.  Discharge instructions complete. Bedside delivery complete of antibiotic. Wheelchair requested. Bedside nurse notified.

## 2024-11-20 NOTE — PT/OT/SLP PROGRESS
Physical Therapy Treatment    Patient Name:  Stanley Granados   MRN:  [id number]    Recommendations:     Discharge Recommendations: Moderate Intensity Therapy (vs low intensity therapy with 24/7 assistance)  Discharge Equipment Recommendations: none  Barriers to discharge:  limited independent safety awareness    Assessment:     Stanley Granados is a 84 y.o. male admitted with a medical diagnosis of Pneumonia of both lungs due to infectious organism.  He presents with the following impairments/functional limitations: weakness, impaired endurance, impaired self care skills, impaired functional mobility, gait instability, impaired balance, decreased safety awareness, impaired cognition.      Pt participates in bed mobility, transfers to standing and ambulation with use of RW. Pt unable to recall current month/year; oriented to name,  and place. Therapy will continue to progress pt as able.       Rehab Prognosis: Good; patient would benefit from acute skilled PT services to address these deficits and reach maximum level of function.    Recent Surgery: * No surgery found *      Plan:     During this hospitalization, patient to be seen 3 x/week to address the identified rehab impairments via gait training, therapeutic activities, therapeutic exercises, neuromuscular re-education and progress toward the following goals:    Plan of Care Expires:  24    Subjective     Chief Complaint: none  Patient/Family Comments/goals: to return home  Pain/Comfort:  Pain Rating 1: 0/10  Pain Rating Post-Intervention 2: 0/10      Objective:     Communicated with Nurse prior to session.  Patient found HOB elevated with bed alarm, PureWick, telemetry (telesitter) upon PT entry to room.     General Precautions: Standard, fall  Orthopedic Precautions: N/A  Braces: N/A  Respiratory Status: Room air     Functional Mobility:  Bed Mobility:     Supine to Sit: minimum assistance  Transfers:     Sit to Stand:  contact guard assistance with rolling  walker  Gait: ~100ft with use of RW and CGA; noted poor trunk posture (forward trunk leaning); verbal cues to improve posture      AM-PAC 6 CLICK MOBILITY  Turning over in bed (including adjusting bedclothes, sheets and blankets)?: 4  Sitting down on and standing up from a chair with arms (e.g., wheelchair, bedside commode, etc.): 3  Moving from lying on back to sitting on the side of the bed?: 3  Moving to and from a bed to a chair (including a wheelchair)?: 3  Need to walk in hospital room?: 3  Climbing 3-5 steps with a railing?: 2  Basic Mobility Total Score: 18       Treatment & Education:  Pt able to recall name/ and place as well as states it is Thanks[place]gan time but unable to recall current month and year (when asked both at beginning and end of session). Able to recall daughter's name present in room.   Pt educated on use of call button for mobility needs in room; pt understanding.   Per daughter: she states someone is with pt  between her and her spouse.     Patient left HOB elevated with all lines intact, call button in reach, bed alarm on, Nurse notified, and daughter present.    GOALS:   Multidisciplinary Problems       Physical Therapy Goals          Problem: Physical Therapy    Goal Priority Disciplines Outcome Interventions   Physical Therapy Goal     PT, PT/OT Progressing    Description: Goals to be met by: 24     Patient will increase functional independence with mobility by performin. Supine to sit with Modified [place]  2. Sit to supine with Modified [place]  3. Sit to stand transfer with Modified [place] with use of RW.  4. Bed to chair transfer with Modified [place] using Rolling Walker  5. Gait  x 150 feet with Modified [place] using Rolling Walker.                          Time Tracking:     PT Received On: 24  PT Start Time: 1111     PT Stop Time: 1129  PT Total Time (min): 18 min     Billable Minutes: Gait Training 18    Treatment  Type: Treatment  PT/PTA: PT     Number of PTA visits since last PT visit: 0     11/20/2024

## 2024-11-20 NOTE — PLAN OF CARE
Problem: Physical Therapy  Goal: Physical Therapy Goal  Description: Goals to be met by: 24     Patient will increase functional independence with mobility by performin. Supine to sit with Modified [place]  2. Sit to supine with Modified [place]  3. Sit to stand transfer with Modified [place] with use of RW.  4. Bed to chair transfer with Modified [place] using Rolling Walker  5. Gait  x 150 feet with Modified [place] using Rolling Walker.     Outcome: Progressing    Pt participates in bed mobility, transfers to standing and ambulation with use of RW. Pt unable to recall current month/year; oriented to name,  and place. Therapy will continue to progress pt as able.

## 2024-11-20 NOTE — PLAN OF CARE
[facility] - Telemetry      HOME HEALTH ORDERS  FACE TO FACE ENCOUNTER    Patient Name: Stanley Granados  YOB: 1940    PCP: Phuong Umaña MD   PCP Address: [address] / [place] [address]  PCP Phone Number: [phone]  PCP Fax: [phone]    Encounter Date: 11/17/24    Admit to Home Health    Diagnoses:  Active Hospital Problems    Diagnosis  POA    *Pneumonia of both lungs due to infectious organism [J18.9]  Yes    Elevated troponin [R79.89]  Yes    Fever [R50.9]  Yes    Hypokalemia [E87.6]  Yes    Atherosclerosis of native coronary artery of native heart with angina pectoris [I25.119]  Yes    Hypertension [I10]  Yes    Stage 3b chronic kidney disease [N18.32]  Yes    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       Follow Up Appointments:  Future Appointments   Date Time Provider Department Center   11/25/2024 11:00 AM Anna Quinonez MD [facility]   12/4/2024  2:00 PM Gaurang Cherry MD [facility]       Allergies:  Review of patient's allergies indicates:   Allergen Reactions    Nsaids (non-steroidal anti-inflammatory drug)        Medications: Review discharge medications with patient and family and provide education.    Current Facility-Administered Medications   Medication Dose Route Frequency Provider Last Rate Last Admin    acetaminophen tablet 650 mg  650 mg Oral Q4H PRN Maria D Ruiz MD   650 mg at 11/17/24 1829    amLODIPine tablet 5 mg  5 mg Oral Daily Maria D Ruiz MD   5 mg at 11/20/24 0806    atorvastatin tablet 40 mg  40 mg Oral QHS Maria D Ruiz MD   40 mg at 11/19/24 2139    carvediloL tablet 25 mg  25 mg Oral BID Maria D Ruiz MD   25 mg at 11/20/24 0806    cefTRIAXone injection 1 g  1 g Intravenous Q24H Phuong Gee MD   1 g at 11/19/24 2139    cetirizine tablet 10 mg  10 mg Oral Daily Maria D Ruiz  MD   10 mg at 11/20/24 0806    enoxaparin injection 40 mg  40 mg Subcutaneous Daily Maria D Ruiz MD   40 mg at 11/19/24 1741    ferrous gluconate tablet 324 mg  324 mg Oral QAM Maria D Ruiz MD   324 mg at 11/20/24 0800    hydrALAZINE injection 10 mg  10 mg Intravenous Q4H PRN Maria D Ruiz MD        influenza (adjuvanted) (Fluad) 45 mcg/0.5 mL IM vaccine (> or = 66 yo) 0.5 mL  0.5 mL Intramuscular vaccine x 1 dose Tina Johnson MD        [START ON 11/21/2024] levoFLOXacin tablet 500 mg  500 mg Oral Daily Phuong Gee MD        magnesium oxide tablet 400 mg  400 mg Oral Daily Maria D Ruiz MD   400 mg at 11/20/24 0806    naloxone 0.4 mg/mL injection 0.02 mg  0.02 mg Intravenous PRN Maria D Ruiz MD        pneumoc 20-perlita conj-dip cr(PF) (PREVNAR-20 (PF)) injection Syrg 0.5 mL  0.5 mL Intramuscular vaccine x 1 dose Tina Johnson MD        potassium bicarbonate disintegrating tablet 35 mEq  35 mEq Oral PRN Maria D Ruiz MD        potassium bicarbonate disintegrating tablet 50 mEq  50 mEq Oral PRN Maria D Ruiz MD        potassium bicarbonate disintegrating tablet 60 mEq  60 mEq Oral PRN Maria D Ruiz MD        potassium chloride SA CR tablet 10 mEq  10 mEq Oral Daily Maria D Ruiz MD   10 mEq at 11/20/24 0806    ranolazine 12 hr tablet 1,000 mg  1,000 mg Oral BID Ana Rogers APRN, [place]   1,000 mg at 11/20/24 0806    sodium bicarbonate tablet 650 mg  650 mg Oral BID Maria D Ruiz MD   650 mg at 11/20/24 0806        Medication List        START taking these medications      levoFLOXacin 500 MG tablet  Commonly known as: LEVAQUIN  Take 1 tablet (500 mg total) by mouth once daily. for 3 days  Start taking on: November 21, 2024            CONTINUE taking these medications      amLODIPine 5 MG tablet  Commonly known as: NORVASC  Take 5 mg by mouth.     atorvastatin 40 MG tablet  Commonly known as: LIPITOR  Take 40 mg by mouth.     carvediloL 25 MG  tablet  Commonly known as: COREG  Take 25 mg by mouth 2 (two) times daily.     ferrous gluconate 324 MG tablet  Commonly known as: FERGON  Take 1 tablet by mouth every morning.     fexofenadine 180 MG tablet  Commonly known as: ALLEGRA  Take 1 tablet (180 mg total) by mouth once daily.     magnesium oxide 400 mg (241.3 mg magnesium) tablet  Commonly known as: MAG-OX  Take 1 tablet by mouth once daily.     nitroGLYCERIN 0.4 MG SL tablet  Commonly known as: NITROSTAT  Place 0.4 mg under the tongue.     potassium chloride 10 MEQ Tbsr  Commonly known as: KLOR-CON  Take 10 mEq by mouth.     ranolazine 1,000 mg Tb12  Commonly known as: RANEXA  Take 1,000 mg by mouth 2 (two) times daily.     sodium bicarbonate 650 MG tablet  Take 650 mg by mouth 2 (two) times daily.                I have seen and examined this patient within the last 30 days. My clinical findings that support the need for the home health skilled services and home bound status are the following:no   Weakness/numbness causing balance and gait disturbance due to Weakness/Debility making it taxing to leave home.     Diet:   cardiac diet    Labs:  Report Lab results to PCP.    Referrals/ Consults  Physical Therapy to evaluate and treat. Evaluate for home safety and equipment needs; Establish/upgrade home exercise program. Perform / instruct on therapeutic exercises, gait training, transfer training, and Range of Motion.  Occupational Therapy to evaluate and treat. Evaluate home environment for safety and equipment needs. Perform/Instruct on transfers, ADL training, ROM, and therapeutic exercises.  Speech Therapy  to evaluate and treat for  Swallowing.  Aide to provide assistance with personal care, ADLs, and vital signs.    Activities:   activity as tolerated    Nursing:   Agency to admit patient within 24 hours of hospital discharge unless specified on physician order or at patient request    SN to complete comprehensive assessment including routine vital  signs. Instruct on disease process and s/s of complications to report to MD. Review/verify medication list sent home with the patient at time of discharge  and instruct patient/caregiver as needed. Frequency may be adjusted depending on start of care date.     Skilled nurse to perform up to 3 visits PRN for symptoms related to diagnosis    Notify MD if SBP > 160 or < 90; DBP > 90 or < 50; HR > 120 or < 50; Temp > 101; O2 < 88%;.    Ok to schedule additional visits based on staff availability and patient request on consecutive days within the home health episode.    When multiple disciplines ordered:    Start of Care occurs on Sunday - Wednesday schedule remaining discipline evaluations as ordered on separate consecutive days following the start of care.    Thursday SOC -schedule subsequent evaluations Friday and Monday the following week.     Friday - Saturday SOC - schedule subsequent discipline evaluations on consecutive days starting Monday of the following week.    For all post-discharge communication and subsequent orders please contact patient's primary care physician. If unable to reach primary care physician or do not receive response within 30 minutes, please contact PCP for clinical staff order clarification    Miscellaneous   Routine Skin for Bedridden Patients: Instruct patient/caregiver to apply moisture barrier cream to all skin folds and wet areas in perineal area daily and after baths and all bowel movements.    Home Health Aide:  Nursing Three times weekly, Physical Therapy Three times weekly, Occupational Therapy Three times weekly, Speech Language Pathology Three times weekly, and Home Health Aide Three times weekly    Wound Care Orders  no    I certify that this patient is confined to his home and needs physical therapy, speech therapy, and occupational therapy.

## 2024-11-20 NOTE — PLAN OF CARE
Discharge order noted. No DME ordered. Patient is accepted by [facility] for Home Health. Awaiting Home Health orders to send. PCC and Cardiology appointments scheduled. No further Case Management needs.     contacted daughter Ashley to discuss discharge plans. She is aware and agreeable to Home Health. She reports Porfirio already contacted her. Appointments scheduled and will be on AVS. Patient choice form signed. Daughter encouraged to call with any questions or concerns.  will continue to follow patient through transitions of care and assist with any discharge needs.     Home Health orders are in.     Emergency Contacts    Name Relation Home Work Mobile   Ashley Lane Daughter   [phone]   GILA OLSON Son   [phone]       Future Appointments   Date Time Provider Department Center   11/25/2024 11:00 AM Anna Quinonez MD [facility]   12/4/2024  2:00 PM Gaurang Cherry MD [facility]      [facility]  [facility], Home Therapy Services, Home Living Aide Services [phone] [address] [place] [address]      Next Steps: Follow up  Instructions: Home Health Company        11/20/24 0900   Final Note   Assessment Type Final Discharge Note   Anticipated Discharge Disposition Home-Health   Hospital Resources/Appts/Education Provided Appointments scheduled and added to AVS   Post-Acute Status   Post-Acute Authorization Home Health   Home Health Status Set-up Complete/Auth obtained  (Awaiting Home Health Orders)   Discharge Delays None known at this time     Kelsi Rodriguez RN    [phone]

## 2024-11-20 NOTE — PT/OT/SLP PROGRESS
Occupational Therapy   Treatment    Name: Stanley Granados  MRN: [id number]  Admitting Diagnosis:  Pneumonia of both lungs due to infectious organism       Recommendations:     Discharge Recommendations: Moderate Intensity Therapy  Discharge Equipment Recommendations:  none  Barriers to discharge:  Other (Comment) (decline in cognition)    Assessment:     Stanley Granados is a 84 y.o. male with a medical diagnosis of Pneumonia of both lungs due to infectious organism.  He presents with ..The primary encounter diagnosis was Acute on chronic congestive heart failure, unspecified heart failure type. Diagnoses of Chest pain, CHF (congestive heart failure), and Pneumonia were also pertinent to this visit.  Performance deficits affecting function are weakness, impaired balance, decreased safety awareness, impaired cognition, impaired endurance, decreased ROM, decreased coordination, gait instability, impaired functional mobility.     Rehab Prognosis:  Good; patient would benefit from acute skilled OT services to address these deficits and reach maximum level of function.       Plan:     Patient to be seen 4 x/week to address the above listed problems via self-care/home management, therapeutic activities, therapeutic exercises  Plan of Care Expires: 12/17/24  Plan of Care Reviewed with: patient    Subjective     Patient/Family Comments/goals: To go home  Pain/Comfort:  Pain Rating 1: 0/10  Pain Rating Post-Intervention 1: 0/10    Objective:     Communicated with: nurse prior to session.  Patient found HOB elevated with bed alarm, PureWick, telemetry, Other (comments) (Tele sitter) upon OT entry to room.    General Precautions: Standard, fall    Orthopedic Precautions:N/A  Braces: N/A  Respiratory Status: Room air     Occupational Performance:     Bed Mobility:    Patient completed Supine to Sit with stand by assistance  Patient completed Sit to Supine with stand by assistance     Functional Mobility/Transfers:  Patient completed  Sit <> Stand Transfer with minimum assistance  with  rolling walker   Patient completed Toilet Transfer Step Transfer technique with minimum assistance with  rolling walker with cues for walker management and turning  Functional Mobility: Out of room mobility with min A with rolling walker for walker management and safety.   Lateral scoots along bedside with SBA to HOB.     Activities of Daily Living:  Grooming: Supervision ; poor task initiation, needs verbal cues to start tasks.      [facility] 6 Click ADL: 19    Treatment & Education:  Patient re-educated on role of OT.  Patient oriented to name, month/year with choices and increased time.   ADL / functional mobility  training as above. (Verbal cues for proper hand placement on rolling walker; verbal/tactile cues for safety and walker management).   Used simple one step instructions. Needs redirection.   Educated on call light.  100% reciprocation for call light.  Answered inquires in scope.        Patient left HOB elevated with all lines intact, call button in reach, bed alarm on, and nurse notified    GOALS:   Multidisciplinary Problems       Occupational Therapy Goals          Problem: Occupational Therapy    Goal Priority Disciplines Outcome Interventions   Occupational Therapy Goal     OT, PT/OT Progressing    Description: Goals to be met by: 12/17/2024     Patient will increase functional independence with ADLs by performing:    UE Dressing with Set-up Assistance.  LE Dressing with Supervision.  Grooming while standing at sink with Modified [place].  Toileting from toilet with Modified [place] for hygiene and clothing management.   Functional mobility to / from bathroom inclusive of Toilet transfer to toilet with Modified [place] with use of least restrictive device.  100% reciprocation understanding for DME recommendations and at least 2 techniques for safety during ADL regimen.                         Time Tracking:     OT Date of Treatment:  11/20/24  OT Start Time: 0929  OT Stop Time: 0952  OT Total Time (min): 23 min    Billable Minutes:Self Care/Home Management 10  Therapeutic Activity 13    OT/ALIVIA: OT          11/20/2024

## 2024-11-20 NOTE — PLAN OF CARE
Problem: Adult Inpatient Plan of Care  Goal: Plan of Care Review  Outcome: Progressing     Problem: Fever  Goal: Body Temperature in Desired Range  Outcome: Progressing     Problem: Fall Injury Risk  Goal: Absence of Fall and Fall-Related Injury  Outcome: Progressing

## 2024-11-23 LAB — BACTERIA BLD CULT: NORMAL

## 2024-11-25 ENCOUNTER — OFFICE VISIT (OUTPATIENT)
Dept: PRIMARY CARE CLINIC | Facility: CLINIC | Age: 84
End: 2024-11-25
Payer: MEDICARE

## 2024-11-25 ENCOUNTER — LAB VISIT (OUTPATIENT)
Dept: LAB | Facility: HOSPITAL | Age: 84
End: 2024-11-25
Attending: INTERNAL MEDICINE
Payer: MEDICARE

## 2024-11-25 VITALS
BODY MASS INDEX: 22.38 KG/M2 | TEMPERATURE: 98 F | HEART RATE: 84 BPM | WEIGHT: 156.31 LBS | DIASTOLIC BLOOD PRESSURE: 76 MMHG | SYSTOLIC BLOOD PRESSURE: 149 MMHG | HEIGHT: 70 IN | OXYGEN SATURATION: 98 %

## 2024-11-25 DIAGNOSIS — D63.1 ANEMIA OF CHRONIC RENAL FAILURE, STAGE 3A: ICD-10-CM

## 2024-11-25 DIAGNOSIS — N18.31 STAGE 3A CHRONIC KIDNEY DISEASE: ICD-10-CM

## 2024-11-25 DIAGNOSIS — N18.31 ANEMIA OF CHRONIC RENAL FAILURE, STAGE 3A: ICD-10-CM

## 2024-11-25 DIAGNOSIS — H61.23 EXCESSIVE CERUMEN IN BOTH EAR CANALS: ICD-10-CM

## 2024-11-25 DIAGNOSIS — J18.9 PNEUMONIA OF BOTH LUNGS DUE TO INFECTIOUS ORGANISM, UNSPECIFIED PART OF LUNG: Primary | ICD-10-CM

## 2024-11-25 DIAGNOSIS — M06.9 RHEUMATOID ARTHRITIS, INVOLVING UNSPECIFIED SITE, UNSPECIFIED WHETHER RHEUMATOID FACTOR PRESENT: ICD-10-CM

## 2024-11-25 DIAGNOSIS — I16.0 HYPERTENSIVE URGENCY: ICD-10-CM

## 2024-11-25 DIAGNOSIS — R79.89 ELEVATED TROPONIN: ICD-10-CM

## 2024-11-25 PROBLEM — E87.6 HYPOKALEMIA: Status: RESOLVED | Noted: 2024-11-17 | Resolved: 2024-11-25

## 2024-11-25 PROBLEM — R05.9 COUGH: Status: RESOLVED | Noted: 2022-02-17 | Resolved: 2024-11-25

## 2024-11-25 PROBLEM — R50.9 FEVER: Status: RESOLVED | Noted: 2024-11-17 | Resolved: 2024-11-25

## 2024-11-25 LAB
BASOPHILS # BLD AUTO: 0.02 K/UL (ref 0–0.2)
BASOPHILS NFR BLD: 0.4 % (ref 0–1.9)
DIFFERENTIAL METHOD BLD: ABNORMAL
EOSINOPHIL # BLD AUTO: 0.4 K/UL (ref 0–0.5)
EOSINOPHIL NFR BLD: 7.4 % (ref 0–8)
ERYTHROCYTE [DISTWIDTH] IN BLOOD BY AUTOMATED COUNT: 14.5 % (ref 11.5–14.5)
HCT VFR BLD AUTO: 30.4 % (ref 40–54)
HGB BLD-MCNC: 9.8 G/DL (ref 14–18)
IMM GRANULOCYTES # BLD AUTO: 0.01 K/UL (ref 0–0.04)
IMM GRANULOCYTES NFR BLD AUTO: 0.2 % (ref 0–0.5)
IRON SERPL-MCNC: 53 UG/DL (ref 45–160)
LYMPHOCYTES # BLD AUTO: 1.4 K/UL (ref 1–4.8)
LYMPHOCYTES NFR BLD: 27.1 % (ref 18–48)
MCH RBC QN AUTO: 28 PG (ref 27–31)
MCHC RBC AUTO-ENTMCNC: 32.2 G/DL (ref 32–36)
MCV RBC AUTO: 87 FL (ref 82–98)
MONOCYTES # BLD AUTO: 0.5 K/UL (ref 0.3–1)
MONOCYTES NFR BLD: 9.8 % (ref 4–15)
NEUTROPHILS # BLD AUTO: 2.8 K/UL (ref 1.8–7.7)
NEUTROPHILS NFR BLD: 55.1 % (ref 38–73)
NRBC BLD-RTO: 0 /100 WBC
PLATELET # BLD AUTO: 240 K/UL (ref 150–450)
PMV BLD AUTO: 12.5 FL (ref 9.2–12.9)
RBC # BLD AUTO: 3.5 M/UL (ref 4.6–6.2)
RHEUMATOID FACT SERPL-ACNC: <13 IU/ML (ref 0–15)
SATURATED IRON: 15 % (ref 20–50)
TOTAL IRON BINDING CAPACITY: 354 UG/DL (ref 250–450)
TRANSFERRIN SERPL-MCNC: 239 MG/DL (ref 200–375)
WBC # BLD AUTO: 5.01 K/UL (ref 3.9–12.7)

## 2024-11-25 PROCEDURE — 99214 OFFICE O/P EST MOD 30 MIN: CPT | Mod: PBBFAC,PO | Performed by: INTERNAL MEDICINE

## 2024-11-25 PROCEDURE — 84466 ASSAY OF TRANSFERRIN: CPT | Performed by: INTERNAL MEDICINE

## 2024-11-25 PROCEDURE — 85025 COMPLETE CBC W/AUTO DIFF WBC: CPT | Performed by: INTERNAL MEDICINE

## 2024-11-25 PROCEDURE — 36415 COLL VENOUS BLD VENIPUNCTURE: CPT | Performed by: INTERNAL MEDICINE

## 2024-11-25 PROCEDURE — 99999 PR PBB SHADOW E&M-EST. PATIENT-LVL IV: CPT | Mod: PBBFAC,,, | Performed by: INTERNAL MEDICINE

## 2024-11-25 PROCEDURE — 86431 RHEUMATOID FACTOR QUANT: CPT | Performed by: INTERNAL MEDICINE

## 2024-11-25 PROCEDURE — 99204 OFFICE O/P NEW MOD 45 MIN: CPT | Mod: S$PBB,,, | Performed by: INTERNAL MEDICINE

## 2024-11-25 RX ORDER — FERROUS GLUCONATE 324(38)MG
1 TABLET ORAL EVERY MORNING
Qty: 90 TABLET | Refills: 3 | Status: SHIPPED | OUTPATIENT
Start: 2024-11-25

## 2024-11-25 RX ORDER — ATORVASTATIN CALCIUM 40 MG/1
40 TABLET, FILM COATED ORAL DAILY
Qty: 90 TABLET | Refills: 3 | Status: SHIPPED | OUTPATIENT
Start: 2024-11-25

## 2024-11-25 RX ORDER — AMLODIPINE BESYLATE 5 MG/1
5 TABLET ORAL DAILY
Qty: 90 TABLET | Refills: 3 | Status: SHIPPED | OUTPATIENT
Start: 2024-11-25

## 2024-11-25 RX ORDER — RANOLAZINE 1000 MG/1
1000 TABLET, EXTENDED RELEASE ORAL 2 TIMES DAILY
Qty: 180 TABLET | Refills: 3 | Status: SHIPPED | OUTPATIENT
Start: 2024-11-25

## 2024-11-25 RX ORDER — CARVEDILOL 25 MG/1
25 TABLET ORAL 2 TIMES DAILY
Qty: 180 TABLET | Refills: 3 | Status: SHIPPED | OUTPATIENT
Start: 2024-11-25

## 2024-11-25 RX ORDER — LANOLIN ALCOHOL/MO/W.PET/CERES
1 CREAM (GRAM) TOPICAL DAILY
Qty: 90 TABLET | Refills: 3 | Status: SHIPPED | OUTPATIENT
Start: 2024-11-25

## 2024-11-25 RX ORDER — SODIUM BICARBONATE 650 MG/1
650 TABLET ORAL 2 TIMES DAILY
Qty: 180 TABLET | Refills: 3 | Status: SHIPPED | OUTPATIENT
Start: 2024-11-25

## 2024-11-25 NOTE — PROGRESS NOTES
[facility]   New Visit Progress Note   Recent Hospital Discharge     PRESENTING HISTORY     Chief Complaint/Reason for Admission:  Follow up Hospital Discharge   PCP: Phuong Umaña MD    History of Present Illness:  Mr. Stanley Granados is a 84 y.o. male who was recently admitted to the hospital.    [facility] Medicine  Discharge Summary        Patient Name: Stanley Granados  MRN: [id number]  LAURYN: [id number]  Patient Class: IP- Inpatient  Admission Date: 11/17/2024  Hospital Length of Stay: 2 days  Discharge Date and Time:  11/20/2024 8:37 AM  Attending Physician: Phuong Gee MD   Discharging Provider: Phuong Salguero MD  Primary Care Provider: Phuong Umaña MD  ___________________________________________________________________    Today:  [facility] for [facility] hospital follow up.  Recently hospitalized for management of PNA.  Also noted to have troponin elevation and hypertensive urgency with /85 at presentation.  Febrile 102.1 at presentation.  Admitted to [facility] service with Cardiology consultation.  Home anti hypertensive regimen restarted with improvement in blood pressure.  Empiric ABX initiated for PNA.   Flu and COVID NEG.  Blood Cx NGTD.  TTE with EF 64% and Grade II diastolic dysfunction.  Patient required diureses while hospitalized.  Cardiology team felt elevated troponin 2/2 demand ischemia-> medical management recommended.   Patient discharged home on Levaquin.  [facility] arranged.     Patient accompanied today by daughter.  Ambulatory with [facility].  Brought medication bottles for review- did not take AM meds yet.  Just moved here from [place] and would like to transfer all care to [facility] area (PCP, Rheumatology, Nephrology, Cardiology).     Review of Systems  General ROS: negative for chills, fever or weight loss  Psychological ROS: negative for hallucination, depression or suicidal ideation  Ophthalmic ROS: negative for  blurry vision, photophobia or eye pain  ENT ROS: + bilateral ear pain, worse on right, hearing loss   Respiratory ROS: no cough, shortness of breath, or wheezing  Cardiovascular ROS: no chest pain + dyspnea on exertion  Gastrointestinal ROS: no abdominal pain, change in bowel habits, or black/ bloody stools  Genito-Urinary ROS: no dysuria, trouble voiding, or hematuria  Musculoskeletal ROS: negative for gait disturbance or muscular weakness  Neurological ROS: no syncope or seizures; no ataxia  Dermatological ROS: negative for pruritis, rash and jaundice      PAST HISTORY:     Past Medical History:   Diagnosis Date    Arthritis     Hypertension     Renal disorder     Stroke        No past surgical history on file.    Family History   Problem Relation Name Age of Onset    Hypertension Mother      COPD Father      No Known Problems Sister Zunilda     No Known Problems Brother Rowdy Hough     Hypertension Brother Jaxon     Diabetes Brother Jaxon     Hypertension Daughter x3     Hyperlipidemia Daughter x3     Diabetes Daughter x3     Lupus Daughter x3     Lymphoma Daughter x1     Hypertension Son x2     Diabetes Son x2          MEDICATIONS & ALLERGIES:     Current Outpatient Medications on File Prior to Visit   Medication Sig Dispense Refill    amLODIPine (NORVASC) 5 MG tablet Take 1 tablet (5 mg total) by mouth once daily. 30 tablet 0    atorvastatin (LIPITOR) 40 MG tablet Take 1 tablet (40 mg total) by mouth once daily. 30 tablet 0    carvediloL (COREG) 25 MG tablet Take 1 tablet (25 mg total) by mouth 2 (two) times daily. 60 tablet 0    ferrous gluconate (FERGON) 324 MG tablet Take 1 tablet (324 mg total) by mouth every morning. 30 tablet 0    fexofenadine (ALLEGRA) 180 MG tablet Take 1 tablet (180 mg total) by mouth once daily. 30 tablet 0    [] levoFLOXacin (LEVAQUIN) 500 MG tablet Take 1 tablet (500 mg total) by mouth once daily. for 3 days 3 tablet 0    magnesium oxide (MAG-OX) 400 mg (241.3 mg magnesium)  "tablet Take 1 tablet (400 mg total) by mouth once daily. 30 tablet 3    nitroGLYCERIN (NITROSTAT) 0.4 MG SL tablet Place 1 tablet (0.4 mg total) under the tongue every 5 (five) minutes as needed for Chest pain. 25 tablet 3    potassium chloride (KLOR-CON) 10 MEQ TbSR Take 1 tablet (10 mEq total) by mouth once daily. 30 tablet 0    ranolazine (RANEXA) 1,000 mg Tb12 Take 1 tablet (1,000 mg total) by mouth 2 (two) times daily. 60 tablet 0    sodium bicarbonate 650 MG tablet Take 1 tablet (650 mg total) by mouth 2 (two) times daily. 60 tablet 0     No current facility-administered medications on file prior to visit.        Review of patient's allergies indicates:   Allergen Reactions    Nsaids (non-steroidal anti-inflammatory drug)        OBJECTIVE:     Vital Signs:  BP (!) 149/76 (BP Location: Right arm, Patient Position: Sitting)   Pulse 84   Temp 98 °F (36.7 °C) (Oral)   Ht 5' 10" (1.778 m)   Wt 70.9 kg (156 lb 4.9 oz)   SpO2 98%   BMI 22.43 kg/m²   Wt Readings from Last 3 Encounters:   11/18/24 1141 69.1 kg (152 lb 5.4 oz)   11/17/24 1741 69.1 kg (152 lb 5.4 oz)   11/17/24 0914 68 kg (150 lb)   06/04/24 1413 72.3 kg (159 lb 4.5 oz)   09/21/22 1434 59 kg (130 lb)     There is no height or weight on file to calculate BMI.        Physical Exam:  BP (!) 149/76 (BP Location: Right arm, Patient Position: Sitting)   Pulse 84   Temp 98 °F (36.7 °C) (Oral)   Ht 5' 10" (1.778 m)   Wt 70.9 kg (156 lb 4.9 oz)   SpO2 98%   BMI 22.43 kg/m²   General appearance: alert, cooperative, no distress  Constitutional:Oriented to person, place, and time  + appears well-developed and well-nourished.   HEENT: Normocephalic, atraumatic, neck symmetrical, no nasal discharge   + impacted ear wax bilaterally   Eyes: conjunctivae/corneas clear, PERRL, EOM's intact  Lungs: clear to auscultation bilaterally, no dullness to percussion bilaterally  Heart: regular rate and rhythm without rub; no displacement of the PMI   Abdomen: soft, " "non-tender; bowel sounds normoactive; no organomegaly  Extremities: extremities symmetric; no clubbing, cyanosis, or edema  Integument: Skin color, texture, turgor normal; no rashes; hair distrubution normal  Neurologic: Alert and oriented X 3, normal strength  Psychiatric: no pressured speech; normal affect; + memory impaired     Laboratory  Lab Results   Component Value Date    WBC 5.97 11/17/2024    HGB 8.6 (L) 11/17/2024    HCT 26.7 (L) 11/17/2024    MCV 89 11/17/2024     11/17/2024     BMP  Lab Results   Component Value Date     11/20/2024    K 4.4 11/20/2024     11/20/2024    CO2 26 11/20/2024    BUN 22 11/20/2024    CREATININE 1.3 11/20/2024    CALCIUM 8.5 (L) 11/20/2024    ANIONGAP 8 11/20/2024    EGFRNORACEVR 55 (A) 11/20/2024     Lab Results   Component Value Date    ALT 14 11/17/2024    AST 22 11/17/2024    ALKPHOS 60 11/17/2024    BILITOT 0.5 11/17/2024     No results found for: "INR", "PROTIME"  Lab Results   Component Value Date    HGBA1C 5.5 11/12/2020       Diagnostic Results:    Chest X Ray 11/17/24:  Findings suggestive mild interstitial edema.     TTE 11/18/24:    Left Ventricle: The left ventricle is normal in size. Moderate septal thickening. There is concentric hypertrophy. There is normal systolic function. Biplane (2D) method of discs ejection fraction is 64%. Grade II diastolic dysfunction. Elevated left ventricular filling pressure.    Right Ventricle: Normal right ventricular cavity size. Wall thickness is normal. Systolic function is normal.    Left Atrium: Left atrium is severely dilated.    Aortic Valve: The aortic valve is a trileaflet valve. There is moderate aortic valve sclerosis. Mildly restricted motion. There is moderate aortic regurgitation with an anteromedial eccentrically directed jet.    Pulmonary Artery: The estimated pulmonary artery systolic pressure is 34 mmHg.    IVC/SVC: Normal venous pressure at 3 mmHg.    ASSESSMENT & PLAN:       Pneumonia of both " lungs due to infectious organism, unspecified part of lung  - recent hospitalization as above  - completed Levaquin as prescribed  - respiratory status stable on room air     Hypertensive urgency  - was OFF all home medications for several weeks at time of presentation  - all meds refilled today for 1 year supply  - blood pressure not at goal presently- he didn't take morning dose yet     Elevated troponin  - evaluate by Cardiology team- thought 2/2 demand ischemia in setting of PNA and hypertensive urgency  - see Cardiology 12/4/24     Anemia of chronic renal failure, stage 3a  - continue oral iron supplement  - repeat labs today  - never had stool testing or colonoscopy- defer to PCP   -     CBC Auto Differential; Future; Expected date: 11/26/2024  -     Iron and TIBC; Future; Expected date: 11/25/2024    Stage 3a chronic kidney disease  - established with Dr Basurto at [facility] but relocated to [facility] (now living with daughter) and requests tx to local Nephrologist   - awaiting scheduling with Dr Rocha   -     Ambulatory referral/consult to Nephrology; Future; Expected date: 12/02/2024    Excessive cerumen in both ear canals  - see ENT 12/16/24   -     Ambulatory referral/consult to ENT; Future; Expected date: 12/02/2024    Rheumatoid arthritis, involving unspecified site, unspecified whether rheumatoid factor present  - daughter provides history of rheumatoid arthritis but patient has not seen rheumatology in many years and not on related medication- I am unable to locate any rheumatology records in Care Everywhere   - patient was seeing pain management team in [place], [place] to manage joint pain- I am also unable to view records  - labs today  - see Rheumatology 3/14/24   -     Ambulatory referral/consult to Rheumatology; Future; Expected date: 12/02/2024  -     RHEUMATOID FACTOR; Future; Expected date: 11/25/2024    Other orders  -     amLODIPine (NORVASC) 5 MG tablet; Take 1 tablet (5 mg total) by  mouth once daily.  Dispense: 90 tablet; Refill: 3  -     atorvastatin (LIPITOR) 40 MG tablet; Take 1 tablet (40 mg total) by mouth once daily.  Dispense: 90 tablet; Refill: 3  -     carvediloL (COREG) 25 MG tablet; Take 1 tablet (25 mg total) by mouth 2 (two) times daily.  Dispense: 180 tablet; Refill: 3  -     ferrous gluconate (FERGON) 324 MG tablet; Take 1 tablet (324 mg total) by mouth every morning.  Dispense: 90 tablet; Refill: 3  -     magnesium oxide (MAG-OX) 400 mg (241.3 mg magnesium) tablet; Take 1 tablet (400 mg total) by mouth once daily.  Dispense: 90 tablet; Refill: 3  -     ranolazine (RANEXA) 1,000 mg Tb12; Take 1 tablet (1,000 mg total) by mouth 2 (two) times daily.  Dispense: 180 tablet; Refill: 3  -     sodium bicarbonate 650 MG tablet; Take 1 tablet (650 mg total) by mouth 2 (two) times daily.  Dispense: 180 tablet; Refill: 3    Patient will be released from hospital follow up clinic.  Follow up as detailed below.     Instructions for the patient:      Scheduled Follow-up :  Future Appointments   Date Time Provider Department Center   12/4/2024  2:00 PM Gaurang Cherry MD [facility]   12/16/2024  8:40 AM Chelle Irby NP [facility]   1/13/2025 10:40 AM Phuong Umaña MD [facility]   3/14/2025  8:40 AM Maria Elena Berry MD [facility]       Post Visit Medication List:     Medication List            Accurate as of November 25, 2024 12:01 PM. If you have any questions, ask your nurse or doctor.                CONTINUE taking these medications      amLODIPine 5 MG tablet  Commonly known as: NORVASC  Take 1 tablet (5 mg total) by mouth once daily.     atorvastatin 40 MG tablet  Commonly known as: LIPITOR  Take 1 tablet (40 mg total) by mouth once daily.     carvediloL 25 MG tablet  Commonly known as: COREG  Take 1 tablet (25 mg total) by mouth 2 (two) times daily.     ferrous gluconate 324 MG tablet  Commonly known as: FERGON  Take 1 tablet (324 mg  total) by mouth every morning.     fexofenadine 180 MG tablet  Commonly known as: ALLEGRA  Take 1 tablet (180 mg total) by mouth once daily.     magnesium oxide 400 mg (241.3 mg magnesium) tablet  Commonly known as: MAG-OX  Take 1 tablet (400 mg total) by mouth once daily.     nitroGLYCERIN 0.4 MG SL tablet  Commonly known as: NITROSTAT  Place 1 tablet (0.4 mg total) under the tongue every 5 (five) minutes as needed for Chest pain.     potassium chloride 10 MEQ Tbsr  Commonly known as: KLOR-CON  Take 1 tablet (10 mEq total) by mouth once daily.     ranolazine 1,000 mg Tb12  Commonly known as: RANEXA  Take 1 tablet (1,000 mg total) by mouth 2 (two) times daily.     sodium bicarbonate 650 MG tablet  Take 1 tablet (650 mg total) by mouth 2 (two) times daily.               Where to Get Your Medications        These medications were sent to [facility]  [address], [place] [address]      Hours: Mon-Fri, 8a-5:30p Phone: [phone]   amLODIPine 5 MG tablet  atorvastatin 40 MG tablet  carvediloL 25 MG tablet  ferrous gluconate 324 MG tablet  magnesium oxide 400 mg (241.3 mg magnesium) tablet  ranolazine 1,000 mg Tb12  sodium bicarbonate 650 MG tablet         Signing Physician:  Anna Quinonez MD

## 2024-12-16 ENCOUNTER — CLINICAL SUPPORT (OUTPATIENT)
Dept: OTOLARYNGOLOGY | Facility: CLINIC | Age: 84
End: 2024-12-16
Payer: MEDICARE

## 2024-12-16 ENCOUNTER — OFFICE VISIT (OUTPATIENT)
Dept: OTOLARYNGOLOGY | Facility: CLINIC | Age: 84
End: 2024-12-16
Payer: MEDICARE

## 2024-12-16 VITALS — HEART RATE: 57 BPM | SYSTOLIC BLOOD PRESSURE: 126 MMHG | DIASTOLIC BLOOD PRESSURE: 63 MMHG

## 2024-12-16 DIAGNOSIS — H90.3 SENSORINEURAL HEARING LOSS (SNHL) OF BOTH EARS: Primary | ICD-10-CM

## 2024-12-16 DIAGNOSIS — H90.3 SENSORINEURAL HEARING LOSS (SNHL) OF BOTH EARS: ICD-10-CM

## 2024-12-16 DIAGNOSIS — H61.23 EXCESSIVE CERUMEN IN BOTH EAR CANALS: Primary | ICD-10-CM

## 2024-12-16 DIAGNOSIS — K14.8 TONGUE LESION: ICD-10-CM

## 2024-12-16 PROCEDURE — 99999 PR PBB SHADOW E&M-EST. PATIENT-LVL IV: CPT | Mod: PBBFAC,,, | Performed by: NURSE PRACTITIONER

## 2024-12-16 PROCEDURE — 99213 OFFICE O/P EST LOW 20 MIN: CPT | Mod: S$PBB,,, | Performed by: NURSE PRACTITIONER

## 2024-12-16 PROCEDURE — 92557 COMPREHENSIVE HEARING TEST: CPT | Mod: PBBFAC,PN

## 2024-12-16 PROCEDURE — 99214 OFFICE O/P EST MOD 30 MIN: CPT | Mod: PBBFAC,PN | Performed by: NURSE PRACTITIONER

## 2024-12-16 PROCEDURE — 92567 TYMPANOMETRY: CPT | Mod: PBBFAC,PN

## 2024-12-16 NOTE — PROGRESS NOTES
"Stanley Granados, a 84 y.o. male was seen today in the clinic for an audiologic evaluation. The patient's family reports significant hearing issues despite Mr. Granados denying difficulty.  According to patient's daughter, he often asks for repetition and increases TV volume to a level uncomfortable for others in the home. Mr. Granados denies tinnitus, ear pain, and drainage. 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    Pure tone testing revealed severe to profound primarily sensorineural hearing loss in the right ear and moderate to profound sensorineural hearing loss in the left ear. Speech reception thresholds were obtained at 60 dBHL in the right ear and 50 dBHL in the left ear. Speech discrimination scores were 52% in the right ear and 60% in the left ear. Tympanometry revealed Type C tympanogram in the right ear (pressure -145 daPa) and Type A tympanogram in the left ear.    Results were reviewed with the patient and the recommendation of a hearing aid consultation was discussed.    Recommendations:  Otologic evaluation  Annual audiologic evaluation  Hearing protection in noise  Hearing aid consultation  Use of assistive devices such as "TV Ears" and "Pocket Talker"          "

## 2024-12-16 NOTE — PROGRESS NOTES
Patient ID: Stanley Granados is a 84 y.o. y.o. male    Chief Complaint:   Chief Complaint   Patient presents with    Cerumen Impaction     Per Primary doctor    Hearing Loss       Patient is referred to me by Dr. Anna Quinonez in consultation for evaluation of a possible wax impaction in bilateral ears.  He is here today with his daughter. he has complaints of hearing loss in the affected ears, but denies pain or drainage.  This has not been an issue in the past.  The patient has not been using any sort of ear drop to soften the wax. Denies history of noise exposure.   Reports of tongue lesion that has been there over 2 years. Denies tongue pain.       Review of Systems   Constitutional: Negative for fever, chills, fatigue and unexpected weight change.   HENT: Positive for ear blockage. Negative for hearing loss, nosebleeds, congestion, sore throat, facial swelling, rhinorrhea, sneezing, trouble swallowing, dental problem, voice change, postnasal drip, sinus pressure, tinnitus and ear discharge.    Eyes: Negative for redness, itching and visual disturbance.   Respiratory: Negative for cough, choking and wheezing.    Cardiovascular: Negative for chest pain and palpitations.   Gastrointestinal: Negative for abdominal pain.        No reflux.   Musculoskeletal: Negative for gait problem.   Skin: Negative for rash.   Neurological: Negative for dizziness, light-headedness and headaches.     Past Medical History:   Diagnosis Date    Arthritis     Hypertension     Renal disorder     Stroke      History reviewed. No pertinent surgical history.  Social History     Socioeconomic History    Marital status: Single   Tobacco Use    Smoking status: Former     Current packs/day: 0.00     Types: Cigarettes     Quit date: 1994     Years since quittin.5     Passive exposure: Never    Smokeless tobacco: Never   Substance and Sexual Activity    Alcohol use: Not Currently    Drug use: Not Currently    Sexual activity: Not  Currently   Social History Narrative    ** Merged History Encounter **          Social Drivers of Health     Financial Resource Strain: Patient Declined (11/18/2024)    Overall Financial Resource Strain (CARDIA)     Difficulty of Paying Living Expenses: Patient declined   Food Insecurity: Patient Declined (11/18/2024)    Hunger Vital Sign     Worried About Running Out of Food in the Last Year: Patient declined     Ran Out of Food in the Last Year: Patient declined   Transportation Needs: No Transportation Needs (11/18/2024)    TRANSPORTATION NEEDS     Transportation : No   Physical Activity: Patient Declined (11/18/2024)    Exercise Vital Sign     Days of Exercise per Week: Patient declined     Minutes of Exercise per Session: Patient declined   Stress: Patient Declined (11/18/2024)    [employer] - Occupational Stress Questionnaire     Feeling of Stress : Patient declined   Housing Stability: Low Risk  (11/18/2024)    Housing Stability Vital Sign     Unable to Pay for Housing in the Last Year: No     Homeless in the Last Year: No     Family History   Problem Relation Name Age of Onset    Hypertension Mother      COPD Father      No Known Problems Sister Zunilda     No Known Problems Brother Rowdy Hough     Hypertension Brother Jaxon     Diabetes Brother Jaxon     Hypertension Daughter x3     Hyperlipidemia Daughter x3     Diabetes Daughter x3     Lupus Daughter x3     Lymphoma Daughter x1     Hypertension Son x2     Diabetes Son x2        Objective:      Vitals:    12/16/24 0814   BP: 126/63   Pulse: (!) 57       Physical Exam   Constitutional: Well appearing / communicating without difficutly.  NAD.  Eyes: EOM I Bilaterally  Head/Face: Normocephalic. Negative paranasal sinus pressure/tenderness.  Salivary glands WNL.  House Brackmann I Bilaterally.     Right Ear: Auricle normal appearance. External Auditory Canal with no lesions or masses,TM w/o masses/lesions/perforations. TM  mobility noted.   Left Ear: Auricle normal appearance. External Auditory Canal  with no lesions or masses,TM w/o masses/lesions/perforations. TM mobility noted.  Nose: No gross nasal septal deviation. Inferior Turbinates 3+ bilaterally. No septal perforation. No masses/lesions. External nasal skin appears normal without masses/lesions.   Oral Cavity: Gingiva/lips within normal limits.  Mucus membranes moist. Floor of mouth soft, no masses palpated. Oral Tongue mobile. + lesion on tip of tongue.  Palate appears normal.    Oropharynx: Base of tongue appears normal. No masses/lesions noted. Tonsillar fossa/pharyngeal wall without lesions. Posterior oropharynx WNL.  Soft palate without masses. Midline uvula.   Neck/Lymphatic: No LAD I-VI bilaterally.  No thyromegaly.  No masses noted on exam.     Mirror laryngoscopy/nasopharyngoscopy: Active gag reflex.  Unable to perform.     Neuro/Psychiatric: AOx3.  Normal mood and affect.   Cardiovascular: Normal carotid pulses bilaterally, no increasing jugular venous distention noted at cervical region bilaterally.    Respiratory: Normal respiratory effort, no stridor, no retractions noted.            Audiogram interpreted personally by me and discussed in detail with the patient today.   Pure tone testing revealed severe to profound primarily sensorineural hearing loss in the right ear and moderate to profound sensorineural hearing loss in the left ear. Speech reception thresholds were obtained at 60 dBHL in the right ear and 50 dBHL in the left ear. Speech discrimination scores were 52% in the right ear and 60% in the left ear. Tympanometry revealed Type C tympanogram in the right ear (pressure -145 daPa) and Type A tympanogram in the left ear.       Assessment:         ICD-10-CM ICD-9-CM    1. Excessive cerumen in both ear canals  H61.23 380.4 Ambulatory referral/consult to ENT      2. Tongue lesion  K14.8 529.8 Ambulatory referral/consult to ENT      3. 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 The patient reported that it has been there for 2 years, I would like to refer him to ENT for further evaluation and determine if biopsy is warranted    -We reviewed the patient's recent audiogram and hearing loss in detail.  We also discussed that he is a good candidate for hearing aids, if and when he the patient is motivated.  He is not interested in hearing aids at this time.   We also discussed the use hearing protection when exposed to loud noise, including lawn equipment.         Chelle Irby NP

## 2024-12-31 ENCOUNTER — HOSPITAL ENCOUNTER (INPATIENT)
Facility: HOSPITAL | Age: 84
LOS: 4 days | Discharge: NURSING, REHAB OR LONG-TERM CARE | DRG: 065 | End: 2025-01-04
Attending: EMERGENCY MEDICINE | Admitting: HOSPITALIST
Payer: MEDICARE

## 2024-12-31 ENCOUNTER — EXTERNAL HOME HEALTH (OUTPATIENT)
Dept: HOME HEALTH SERVICES | Facility: HOSPITAL | Age: 84
End: 2024-12-31
Payer: MEDICARE

## 2024-12-31 DIAGNOSIS — I63.81 CEREBROVASCULAR ACCIDENT (CVA) DUE TO STENOSIS OF SMALL ARTERY: ICD-10-CM

## 2024-12-31 DIAGNOSIS — R41.82 ALTERED MENTAL STATUS, UNSPECIFIED ALTERED MENTAL STATUS TYPE: Primary | ICD-10-CM

## 2024-12-31 DIAGNOSIS — R29.818 ACUTE FOCAL NEUROLOGICAL DEFICIT: ICD-10-CM

## 2024-12-31 DIAGNOSIS — N18.32 STAGE 3B CHRONIC KIDNEY DISEASE: ICD-10-CM

## 2024-12-31 DIAGNOSIS — R52 PAIN: ICD-10-CM

## 2024-12-31 DIAGNOSIS — I63.9 STROKE: ICD-10-CM

## 2024-12-31 DIAGNOSIS — R47.81 SLURRED SPEECH: ICD-10-CM

## 2024-12-31 PROBLEM — G93.40 ACUTE ENCEPHALOPATHY: Status: RESOLVED | Noted: 2024-12-31 | Resolved: 2024-12-31

## 2024-12-31 PROBLEM — G93.40 ACUTE ENCEPHALOPATHY: Status: ACTIVE | Noted: 2024-12-31

## 2024-12-31 LAB
ALBUMIN SERPL BCP-MCNC: 3.6 G/DL (ref 3.5–5.2)
ALP SERPL-CCNC: 73 U/L (ref 40–150)
ALT SERPL W/O P-5'-P-CCNC: 14 U/L (ref 10–44)
ANION GAP SERPL CALC-SCNC: 12 MMOL/L (ref 8–16)
AST SERPL-CCNC: 17 U/L (ref 10–40)
BASOPHILS # BLD AUTO: 0.02 K/UL (ref 0–0.2)
BASOPHILS NFR BLD: 0.3 % (ref 0–1.9)
BILIRUB SERPL-MCNC: 0.4 MG/DL (ref 0.1–1)
BUN SERPL-MCNC: 22 MG/DL (ref 8–23)
CALCIUM SERPL-MCNC: 9.1 MG/DL (ref 8.7–10.5)
CHLORIDE SERPL-SCNC: 107 MMOL/L (ref 95–110)
CHOLEST SERPL-MCNC: 142 MG/DL (ref 120–199)
CHOLEST/HDLC SERPL: 3.2 {RATIO} (ref 2–5)
CO2 SERPL-SCNC: 20 MMOL/L (ref 23–29)
CREAT SERPL-MCNC: 1.3 MG/DL (ref 0.5–1.4)
DIFFERENTIAL METHOD BLD: ABNORMAL
EOSINOPHIL # BLD AUTO: 0.1 K/UL (ref 0–0.5)
EOSINOPHIL NFR BLD: 2.4 % (ref 0–8)
ERYTHROCYTE [DISTWIDTH] IN BLOOD BY AUTOMATED COUNT: 16 % (ref 11.5–14.5)
EST. GFR  (NO RACE VARIABLE): 55 ML/MIN/1.73 M^2
GLUCOSE SERPL-MCNC: 95 MG/DL (ref 70–110)
HCT VFR BLD AUTO: 30.2 % (ref 40–54)
HDLC SERPL-MCNC: 45 MG/DL (ref 40–75)
HDLC SERPL: 31.7 % (ref 20–50)
HGB BLD-MCNC: 10.2 G/DL (ref 14–18)
IMM GRANULOCYTES # BLD AUTO: 0.01 K/UL (ref 0–0.04)
IMM GRANULOCYTES NFR BLD AUTO: 0.2 % (ref 0–0.5)
INFLUENZA A, MOLECULAR: NEGATIVE
INFLUENZA B, MOLECULAR: NEGATIVE
INR PPP: 1 (ref 0.8–1.2)
LDLC SERPL CALC-MCNC: 84.2 MG/DL (ref 63–159)
LYMPHOCYTES # BLD AUTO: 2.1 K/UL (ref 1–4.8)
LYMPHOCYTES NFR BLD: 35.5 % (ref 18–48)
MCH RBC QN AUTO: 28.8 PG (ref 27–31)
MCHC RBC AUTO-ENTMCNC: 33.8 G/DL (ref 32–36)
MCV RBC AUTO: 85 FL (ref 82–98)
MONOCYTES # BLD AUTO: 0.7 K/UL (ref 0.3–1)
MONOCYTES NFR BLD: 12.1 % (ref 4–15)
NEUTROPHILS # BLD AUTO: 2.9 K/UL (ref 1.8–7.7)
NEUTROPHILS NFR BLD: 49.5 % (ref 38–73)
NONHDLC SERPL-MCNC: 97 MG/DL
NRBC BLD-RTO: 0 /100 WBC
PLATELET # BLD AUTO: 161 K/UL (ref 150–450)
PMV BLD AUTO: 12.3 FL (ref 9.2–12.9)
POCT GLUCOSE: 121 MG/DL (ref 70–110)
POTASSIUM SERPL-SCNC: 4.3 MMOL/L (ref 3.5–5.1)
PROT SERPL-MCNC: 7.6 G/DL (ref 6–8.4)
PROTHROMBIN TIME: 11.2 SEC (ref 9–12.5)
RBC # BLD AUTO: 3.54 M/UL (ref 4.6–6.2)
SARS-COV-2 RDRP RESP QL NAA+PROBE: NEGATIVE
SODIUM SERPL-SCNC: 139 MMOL/L (ref 136–145)
SPECIMEN SOURCE: NORMAL
TRIGL SERPL-MCNC: 64 MG/DL (ref 30–150)
TROPONIN I SERPL DL<=0.01 NG/ML-MCNC: <0.006 NG/ML (ref 0–0.03)
TSH SERPL DL<=0.005 MIU/L-ACNC: 3.3 UIU/ML (ref 0.4–4)
WBC # BLD AUTO: 5.77 K/UL (ref 3.9–12.7)

## 2024-12-31 PROCEDURE — 84443 ASSAY THYROID STIM HORMONE: CPT | Performed by: EMERGENCY MEDICINE

## 2024-12-31 PROCEDURE — 84484 ASSAY OF TROPONIN QUANT: CPT | Performed by: EMERGENCY MEDICINE

## 2024-12-31 PROCEDURE — 80053 COMPREHEN METABOLIC PANEL: CPT | Performed by: EMERGENCY MEDICINE

## 2024-12-31 PROCEDURE — 80061 LIPID PANEL: CPT | Performed by: EMERGENCY MEDICINE

## 2024-12-31 PROCEDURE — 93005 ELECTROCARDIOGRAM TRACING: CPT

## 2024-12-31 PROCEDURE — 85610 PROTHROMBIN TIME: CPT | Performed by: EMERGENCY MEDICINE

## 2024-12-31 PROCEDURE — 99285 EMERGENCY DEPT VISIT HI MDM: CPT | Mod: 25

## 2024-12-31 PROCEDURE — 87635 SARS-COV-2 COVID-19 AMP PRB: CPT | Performed by: EMERGENCY MEDICINE

## 2024-12-31 PROCEDURE — 12000002 HC ACUTE/MED SURGE SEMI-PRIVATE ROOM

## 2024-12-31 PROCEDURE — 82962 GLUCOSE BLOOD TEST: CPT

## 2024-12-31 PROCEDURE — 87502 INFLUENZA DNA AMP PROBE: CPT | Performed by: EMERGENCY MEDICINE

## 2024-12-31 PROCEDURE — 93010 ELECTROCARDIOGRAM REPORT: CPT | Mod: ,,, | Performed by: INTERNAL MEDICINE

## 2024-12-31 PROCEDURE — 85025 COMPLETE CBC W/AUTO DIFF WBC: CPT | Performed by: EMERGENCY MEDICINE

## 2024-12-31 RX ORDER — ACETAMINOPHEN 650 MG/1
650 SUPPOSITORY RECTAL ONCE
Status: DISCONTINUED | OUTPATIENT
Start: 2025-01-01 | End: 2025-01-01

## 2024-12-31 RX ORDER — ONDANSETRON HYDROCHLORIDE 2 MG/ML
4 INJECTION, SOLUTION INTRAVENOUS EVERY 8 HOURS PRN
Status: DISCONTINUED | OUTPATIENT
Start: 2024-12-31 | End: 2025-01-04 | Stop reason: HOSPADM

## 2024-12-31 RX ORDER — AMOXICILLIN 250 MG
1 CAPSULE ORAL 2 TIMES DAILY PRN
Status: DISCONTINUED | OUTPATIENT
Start: 2024-12-31 | End: 2025-01-04 | Stop reason: HOSPADM

## 2024-12-31 RX ORDER — SODIUM CHLORIDE 0.9 % (FLUSH) 0.9 %
5 SYRINGE (ML) INJECTION
Status: DISCONTINUED | OUTPATIENT
Start: 2024-12-31 | End: 2025-01-04 | Stop reason: HOSPADM

## 2024-12-31 RX ORDER — ACETAMINOPHEN 325 MG/1
650 TABLET ORAL EVERY 4 HOURS PRN
Status: DISCONTINUED | OUTPATIENT
Start: 2024-12-31 | End: 2025-01-04 | Stop reason: HOSPADM

## 2024-12-31 RX ORDER — NALOXONE HCL 0.4 MG/ML
0.02 VIAL (ML) INJECTION
Status: DISCONTINUED | OUTPATIENT
Start: 2024-12-31 | End: 2025-01-04 | Stop reason: HOSPADM

## 2024-12-31 RX ORDER — AMOXICILLIN 250 MG
1 CAPSULE ORAL 2 TIMES DAILY
Status: DISCONTINUED | OUTPATIENT
Start: 2024-12-31 | End: 2024-12-31

## 2024-12-31 RX ORDER — POTASSIUM CHLORIDE 600 MG/1
8 TABLET, FILM COATED, EXTENDED RELEASE ORAL DAILY
Status: ON HOLD | COMMUNITY
End: 2025-01-04 | Stop reason: HOSPADM

## 2024-12-31 RX ORDER — ENOXAPARIN SODIUM 100 MG/ML
40 INJECTION SUBCUTANEOUS EVERY 24 HOURS
Status: DISCONTINUED | OUTPATIENT
Start: 2025-01-01 | End: 2025-01-04 | Stop reason: HOSPADM

## 2024-12-31 RX ORDER — TALC
6 POWDER (GRAM) TOPICAL NIGHTLY PRN
Status: DISCONTINUED | OUTPATIENT
Start: 2024-12-31 | End: 2025-01-04 | Stop reason: HOSPADM

## 2024-12-31 RX ORDER — GLUCAGON 1 MG
1 KIT INJECTION
Status: DISCONTINUED | OUTPATIENT
Start: 2024-12-31 | End: 2025-01-04 | Stop reason: HOSPADM

## 2024-12-31 RX ORDER — BISACODYL 10 MG/1
10 SUPPOSITORY RECTAL DAILY PRN
Status: DISCONTINUED | OUTPATIENT
Start: 2025-01-01 | End: 2024-12-31

## 2024-12-31 RX ORDER — POLYETHYLENE GLYCOL 3350 17 G/17G
17 POWDER, FOR SOLUTION ORAL DAILY PRN
Status: DISCONTINUED | OUTPATIENT
Start: 2024-12-31 | End: 2025-01-04 | Stop reason: HOSPADM

## 2024-12-31 RX ORDER — IBUPROFEN 200 MG
24 TABLET ORAL
Status: DISCONTINUED | OUTPATIENT
Start: 2024-12-31 | End: 2025-01-04 | Stop reason: HOSPADM

## 2024-12-31 RX ORDER — IBUPROFEN 200 MG
16 TABLET ORAL
Status: DISCONTINUED | OUTPATIENT
Start: 2024-12-31 | End: 2025-01-04 | Stop reason: HOSPADM

## 2024-12-31 RX ORDER — LABETALOL HYDROCHLORIDE 5 MG/ML
10 INJECTION, SOLUTION INTRAVENOUS
Status: DISCONTINUED | OUTPATIENT
Start: 2025-01-01 | End: 2025-01-04 | Stop reason: HOSPADM

## 2025-01-01 PROBLEM — R10.9 ABDOMINAL PAIN: Status: ACTIVE | Noted: 2025-01-01

## 2025-01-01 LAB
ALBUMIN SERPL BCP-MCNC: 3.3 G/DL (ref 3.5–5.2)
ALP SERPL-CCNC: 69 U/L (ref 40–150)
ALT SERPL W/O P-5'-P-CCNC: 12 U/L (ref 10–44)
ANION GAP SERPL CALC-SCNC: 10 MMOL/L (ref 8–16)
APTT PPP: 24.8 SEC (ref 21–32)
AST SERPL-CCNC: 14 U/L (ref 10–40)
BASOPHILS # BLD AUTO: 0.03 K/UL (ref 0–0.2)
BASOPHILS # BLD AUTO: 0.03 K/UL (ref 0–0.2)
BASOPHILS NFR BLD: 0.7 % (ref 0–1.9)
BASOPHILS NFR BLD: 0.7 % (ref 0–1.9)
BILIRUB SERPL-MCNC: 0.8 MG/DL (ref 0.1–1)
BILIRUB UR QL STRIP: NEGATIVE
BUN SERPL-MCNC: 16 MG/DL (ref 8–23)
CALCIUM SERPL-MCNC: 8.8 MG/DL (ref 8.7–10.5)
CHLORIDE SERPL-SCNC: 108 MMOL/L (ref 95–110)
CLARITY UR: CLEAR
CO2 SERPL-SCNC: 21 MMOL/L (ref 23–29)
COLOR UR: YELLOW
CREAT SERPL-MCNC: 1.1 MG/DL (ref 0.5–1.4)
DIFFERENTIAL METHOD BLD: ABNORMAL
DIFFERENTIAL METHOD BLD: ABNORMAL
EOSINOPHIL # BLD AUTO: 0.1 K/UL (ref 0–0.5)
EOSINOPHIL # BLD AUTO: 0.1 K/UL (ref 0–0.5)
EOSINOPHIL NFR BLD: 3.2 % (ref 0–8)
EOSINOPHIL NFR BLD: 3.2 % (ref 0–8)
ERYTHROCYTE [DISTWIDTH] IN BLOOD BY AUTOMATED COUNT: 16.2 % (ref 11.5–14.5)
ERYTHROCYTE [DISTWIDTH] IN BLOOD BY AUTOMATED COUNT: 16.2 % (ref 11.5–14.5)
EST. GFR  (NO RACE VARIABLE): >60 ML/MIN/1.73 M^2
ESTIMATED AVG GLUCOSE: 111 MG/DL (ref 68–131)
FOLATE SERPL-MCNC: 10.5 NG/ML (ref 4–24)
GLUCOSE SERPL-MCNC: 86 MG/DL (ref 70–110)
GLUCOSE UR QL STRIP: NEGATIVE
HBA1C MFR BLD: 5.5 % (ref 4–5.6)
HCT VFR BLD AUTO: 27.9 % (ref 40–54)
HCT VFR BLD AUTO: 27.9 % (ref 40–54)
HGB BLD-MCNC: 9.3 G/DL (ref 14–18)
HGB BLD-MCNC: 9.3 G/DL (ref 14–18)
HGB UR QL STRIP: ABNORMAL
HIV 1+2 AB+HIV1 P24 AG SERPL QL IA: NORMAL
IMM GRANULOCYTES # BLD AUTO: 0.01 K/UL (ref 0–0.04)
IMM GRANULOCYTES # BLD AUTO: 0.01 K/UL (ref 0–0.04)
IMM GRANULOCYTES NFR BLD AUTO: 0.2 % (ref 0–0.5)
IMM GRANULOCYTES NFR BLD AUTO: 0.2 % (ref 0–0.5)
INR PPP: 1.1 (ref 0.8–1.2)
KETONES UR QL STRIP: NEGATIVE
LEUKOCYTE ESTERASE UR QL STRIP: NEGATIVE
LYMPHOCYTES # BLD AUTO: 1.7 K/UL (ref 1–4.8)
LYMPHOCYTES # BLD AUTO: 1.7 K/UL (ref 1–4.8)
LYMPHOCYTES NFR BLD: 38.7 % (ref 18–48)
LYMPHOCYTES NFR BLD: 38.7 % (ref 18–48)
MAGNESIUM SERPL-MCNC: 2.1 MG/DL (ref 1.6–2.6)
MCH RBC QN AUTO: 28.8 PG (ref 27–31)
MCH RBC QN AUTO: 28.8 PG (ref 27–31)
MCHC RBC AUTO-ENTMCNC: 33.3 G/DL (ref 32–36)
MCHC RBC AUTO-ENTMCNC: 33.3 G/DL (ref 32–36)
MCV RBC AUTO: 86 FL (ref 82–98)
MCV RBC AUTO: 86 FL (ref 82–98)
MICROSCOPIC COMMENT: ABNORMAL
MONOCYTES # BLD AUTO: 0.4 K/UL (ref 0.3–1)
MONOCYTES # BLD AUTO: 0.4 K/UL (ref 0.3–1)
MONOCYTES NFR BLD: 9.3 % (ref 4–15)
MONOCYTES NFR BLD: 9.3 % (ref 4–15)
NEUTROPHILS # BLD AUTO: 2.1 K/UL (ref 1.8–7.7)
NEUTROPHILS # BLD AUTO: 2.1 K/UL (ref 1.8–7.7)
NEUTROPHILS NFR BLD: 47.9 % (ref 38–73)
NEUTROPHILS NFR BLD: 47.9 % (ref 38–73)
NITRITE UR QL STRIP: NEGATIVE
NRBC BLD-RTO: 0 /100 WBC
NRBC BLD-RTO: 0 /100 WBC
PH UR STRIP: 7 [PH] (ref 5–8)
PHOSPHATE SERPL-MCNC: 4.1 MG/DL (ref 2.7–4.5)
PLATELET # BLD AUTO: 142 K/UL (ref 150–450)
PLATELET # BLD AUTO: 142 K/UL (ref 150–450)
PMV BLD AUTO: 12.1 FL (ref 9.2–12.9)
PMV BLD AUTO: 12.1 FL (ref 9.2–12.9)
POCT GLUCOSE: 167 MG/DL (ref 70–110)
POCT GLUCOSE: 77 MG/DL (ref 70–110)
POCT GLUCOSE: 77 MG/DL (ref 70–110)
POCT GLUCOSE: 86 MG/DL (ref 70–110)
POTASSIUM SERPL-SCNC: 4.2 MMOL/L (ref 3.5–5.1)
PROT SERPL-MCNC: 6.7 G/DL (ref 6–8.4)
PROT UR QL STRIP: ABNORMAL
PROTHROMBIN TIME: 11.4 SEC (ref 9–12.5)
RBC # BLD AUTO: 3.23 M/UL (ref 4.6–6.2)
RBC # BLD AUTO: 3.23 M/UL (ref 4.6–6.2)
RBC #/AREA URNS HPF: 95 /HPF (ref 0–4)
SODIUM SERPL-SCNC: 139 MMOL/L (ref 136–145)
SP GR UR STRIP: 1.01 (ref 1–1.03)
TREPONEMA PALLIDUM IGG+IGM AB [PRESENCE] IN SERUM OR PLASMA BY IMMUNOASSAY: NONREACTIVE
URN SPEC COLLECT METH UR: ABNORMAL
UROBILINOGEN UR STRIP-ACNC: NEGATIVE EU/DL
VIT B12 SERPL-MCNC: 164 PG/ML (ref 210–950)
WBC # BLD AUTO: 4.39 K/UL (ref 3.9–12.7)
WBC # BLD AUTO: 4.39 K/UL (ref 3.9–12.7)
WBC #/AREA URNS HPF: 2 /HPF (ref 0–5)

## 2025-01-01 PROCEDURE — 85730 THROMBOPLASTIN TIME PARTIAL: CPT

## 2025-01-01 PROCEDURE — 84100 ASSAY OF PHOSPHORUS: CPT

## 2025-01-01 PROCEDURE — 97116 GAIT TRAINING THERAPY: CPT

## 2025-01-01 PROCEDURE — 97166 OT EVAL MOD COMPLEX 45 MIN: CPT

## 2025-01-01 PROCEDURE — 82746 ASSAY OF FOLIC ACID SERUM: CPT

## 2025-01-01 PROCEDURE — 94760 N-INVAS EAR/PLS OXIMETRY 1: CPT

## 2025-01-01 PROCEDURE — 97162 PT EVAL MOD COMPLEX 30 MIN: CPT

## 2025-01-01 PROCEDURE — 85610 PROTHROMBIN TIME: CPT

## 2025-01-01 PROCEDURE — 84207 ASSAY OF VITAMIN B-6: CPT

## 2025-01-01 PROCEDURE — 92610 EVALUATE SWALLOWING FUNCTION: CPT

## 2025-01-01 PROCEDURE — 87389 HIV-1 AG W/HIV-1&-2 AB AG IA: CPT

## 2025-01-01 PROCEDURE — 83036 HEMOGLOBIN GLYCOSYLATED A1C: CPT

## 2025-01-01 PROCEDURE — 63600175 PHARM REV CODE 636 W HCPCS

## 2025-01-01 PROCEDURE — 11000001 HC ACUTE MED/SURG PRIVATE ROOM

## 2025-01-01 PROCEDURE — 84425 ASSAY OF VITAMIN B-1: CPT

## 2025-01-01 PROCEDURE — 92523 SPEECH SOUND LANG COMPREHEN: CPT

## 2025-01-01 PROCEDURE — 97530 THERAPEUTIC ACTIVITIES: CPT

## 2025-01-01 PROCEDURE — 25500020 PHARM REV CODE 255

## 2025-01-01 PROCEDURE — 80053 COMPREHEN METABOLIC PANEL: CPT

## 2025-01-01 PROCEDURE — 85025 COMPLETE CBC W/AUTO DIFF WBC: CPT

## 2025-01-01 PROCEDURE — 86593 SYPHILIS TEST NON-TREP QUANT: CPT

## 2025-01-01 PROCEDURE — 82607 VITAMIN B-12: CPT

## 2025-01-01 PROCEDURE — 81000 URINALYSIS NONAUTO W/SCOPE: CPT

## 2025-01-01 PROCEDURE — 83735 ASSAY OF MAGNESIUM: CPT

## 2025-01-01 RX ORDER — NAPROXEN SODIUM 220 MG/1
81 TABLET, FILM COATED ORAL DAILY
Status: DISCONTINUED | OUTPATIENT
Start: 2025-01-02 | End: 2025-01-04 | Stop reason: HOSPADM

## 2025-01-01 RX ORDER — CLOPIDOGREL BISULFATE 75 MG/1
75 TABLET ORAL DAILY
Status: DISCONTINUED | OUTPATIENT
Start: 2025-01-02 | End: 2025-01-04 | Stop reason: HOSPADM

## 2025-01-01 RX ADMIN — IOHEXOL 100 ML: 350 INJECTION, SOLUTION INTRAVENOUS at 10:01

## 2025-01-01 RX ADMIN — ENOXAPARIN SODIUM 40 MG: 40 INJECTION SUBCUTANEOUS at 04:01

## 2025-01-01 NOTE — PT/OT/SLP EVAL
Occupational Therapy   Evaluation    Name: Stanley Granados  MRN: [id number]  Admitting Diagnosis: Slurred speech  Recent Surgery: * No surgery found *      Recommendations:     Discharge Recommendations: High Intensity Therapy  Discharge Equipment Recommendations:  to be determined by next level of care  Barriers to discharge:  Decreased caregiver support    Assessment:     Stanley Granados is a 84 y.o. male with a medical diagnosis of Slurred speech.  He presents with the following performance deficits affecting function: weakness, impaired endurance, impaired sensation, impaired self care skills, impaired functional mobility, gait instability, impaired balance, impaired cognition, decreased coordination, decreased upper extremity function, decreased lower extremity function, decreased safety awareness, pain, abnormal tone, decreased ROM, impaired coordination, impaired fine motor, impaired skin, edema.      Pt was agreeable to and participated in OT/PT evaluation.  Pt reports that he was mod I with mobility and ADLS at [facility], but this is questionable.  Pt completed his evaluation and noted to require min-mod A with func mobility and setup - mod A with ADLS.  Pt noted with slurred, difficult to understand speech with improved somewhat during evaluation.  He was noted with increased muscle tone throughout his body and sat with a posterior lean EOB.  Goals established to assist pt with returning to [facility] regarding ADLs and func mobility.  Pt will benefit from skilled OT services in order to increase his level of safety and independence with ADLs and mobility.        Rehab Prognosis: Good; patient would benefit from acute skilled OT services to address these deficits and reach maximum level of function.       Plan:     Patient to be seen 5 x/week to address the above listed problems via self-care/home management, therapeutic activities, therapeutic exercises, neuromuscular re-education  Plan of Care Expires: 01/31/25  Plan of  Care Reviewed with: patient    Subjective     Chief Complaint: pain in right knee and confusion  Patient/Family Comments/goals: to return home    Occupational Profile:  Pt is questionable historian   Living Environment: pt lives with his daughter in a SSH with no [place] - WIS with SC and GB  Previous level of function: pt reports that he was mod I with mobility and ADLS - uses rollator  Equipment Used at Home: walker, rolling, rollator, shower chair  Assistance upon Discharge: lives with daughter, but unsure if available 24/7    Pain/Comfort:  Pain Rating 1:  (numerical value not given)  Location - Side 1: Left  Location 1: shoulder (with flexion past 45*)  Pain Addressed 1: Reposition, Distraction, Cessation of Activity  Pain Rating Post-Intervention 1: 0/10  Pain Rating 2:  (numerical value not given)  Location - Side 2: Right  Location 2: knee  Pain Addressed 2: Reposition, Distraction, Cessation of Activity  Pain Rating Post-Intervention 2:  (improved but numerical value not given)    Patients cultural, spiritual, Denominational conflicts given the current situation: no    Objective:     Communicated with: nurse prior to session.  Patient found HOB elevated with blood pressure cuff, oxygen, peripheral IV, pulse ox (continuous), telemetry upon OT entry to room.    General Precautions: Standard, fall, aspiration, aphasia  Orthopedic Precautions: N/A  Braces: N/A  Respiratory Status: Nasal cannula, flow 4 L/min    Occupational Performance:    Bed Mobility:    Patient completed Rolling/Turning to Left with  moderate assistance and with side rail  Patient completed Rolling/Turning to Right with minimum assistance and with side rail  Patient completed Scooting/Bridging with CGA to EOB when seated - max A x2 using draw sheet to HOB when supine  Patient completed Supine to Sit with moderate assistance  Patient completed Sit to Supine with moderate assistance    Functional Mobility/Transfers:  Patient completed Sit <> Stand  Transfer with moderate assistance  with  rolling walker   Functional Mobility: pt able to take a few steps forward, backwards and to his right using RW with min A    Activities of Daily Living:  Grooming: set up A    Upper Body Dressing: minimum assistance    Lower Body Dressing: moderate assistance to carlota slip on shoes    Cognitive/Visual Perceptual:  Cognitive/Psychosocial Skills:     -       Oriented to: Person and Place   -       Follows Commands/attention:Easily distracted and Follows one-step commands  -       Communication: expressive aphasia and slurred and difficult to understand  -       Memory: impaired  -       Safety awareness/insight to disability: impaired   -       Mood/Affect/Coping skills/emotional control: Pleasant    Physical Exam:  Balance: -       sitting EOB with CGA due to posterior lean;  standing with min A using RW for static standing and mod A for dynamic with RW  Postural examination/scapula alignment:    -       Rounded shoulders  -       Forward head  -       Posterior pelvic tilt  Skin integrity: Visible skin intact  Edema:  None noted  Sensation: -       Intact  Upper Extremity Range of Motion:   BUEs = WFL for ADLS - B shoulders with limited flexion (left worse than right and painful with flexion past 45*)  Upper Extremity Strength:  BUEs = WFL for ADLS   Strength:  B hands = good    AMPAC 6 Click ADL:  AMPAC Total Score: 15    Treatment & Education:  Pt completed ADLs and func mobility activities for tx session as noted above  Pt educated on role of OT and POC      Patient left HOB elevated with all lines intact and call button in reach    GOALS:   Multidisciplinary Problems       Occupational Therapy Goals          Problem: Occupational Therapy    Goal Priority Disciplines Outcome Interventions   Occupational Therapy Goal     OT, PT/OT Progressing    Description: Goals to be met by: 01/31/25     Patient will increase functional independence with ADLs by performing:    PAULA  Dressing with Set-up Assistance.  LE Dressing with Set-up Assistance.  Grooming while standing at sink with Contact Guard Assistance.  Toileting from bedside commode with Minimal Assistance for hygiene and clothing management.   Toilet transfer to bedside commode with Contact Guard Assistance.  Upper extremity exercise program 3x10 reps per handout, with assistance as needed.                         History:     Past Medical History:   Diagnosis Date    Arthritis     Hypertension     Renal disorder     Stroke        History reviewed. No pertinent surgical history.    Time Tracking:     OT Date of Treatment: 01/01/25  OT Start Time: 0813  OT Stop Time: 0834  OT Total Time (min): 21 min - coeval with PT    Billable Minutes:Evaluation 10  Therapeutic Activity 11    1/1/2025

## 2025-01-01 NOTE — PLAN OF CARE
Recommendation:  1. Encourage intake at meals as tolerated. 2. Monitor weight/labs.   3. Monitor need for supplements.   4. RD to follow to monitor po intake    Goals:  Pt will tolerate diet with at least 50-75% intake at meals by RD follow up  Nutrition Goal Status: new

## 2025-01-01 NOTE — PLAN OF CARE
Problem: Occupational Therapy  Goal: Occupational Therapy Goal  Description: Goals to be met by: 01/31/25     Patient will increase functional independence with ADLs by performing:    UE Dressing with Set-up Assistance.  LE Dressing with Set-up Assistance.  Grooming while standing at sink with Contact Guard Assistance.  Toileting from bedside commode with Minimal Assistance for hygiene and clothing management.   Toilet transfer to bedside commode with Contact Guard Assistance.  Upper extremity exercise program 3x10 reps per handout, with assistance as needed.    Outcome: Progressing   Pt was agreeable to and participated in OT/PT evaluation.  Pt reports that he was mod I with mobility and ADLS at [facility], but this is questionable.  Pt completed his evaluation and noted to require min-mod A with func mobility and setup - mod A with ADLS.  Pt noted with slurred, difficult to understand speech with improved somewhat during evaluation.  He was noted with increased muscle tone throughout his body and sat with a posterior lean EOB.  Goals established to assist pt with returning to [facility] regarding ADLs and func mobility.  Pt will benefit from skilled OT services in order to increase his level of safety and independence with ADLs and mobility.      Tita Watkins, OT  1/1/2025

## 2025-01-01 NOTE — PLAN OF CARE
Problem: SLP  Goal: SLP Goal  Description: Short Term Goals:  1. Pt will participate in swallow eval to determine safest diet level.  2. Pt will tolerate dental soft tray and thin liquids with no audible dysphagia signs.   3. Pt will participate in speech/lang eval to determine baseline  4. Pt will utilize speech strategies to improve speech clarity in all contexts.   Ongoing goals pending overall improvement.   Outcome: Progressing   Pt to be advanced to dental soft and thin liquids, pt with dysarthric speech which impacts overall speech clarity. Family member was at bedside this am while patient was gone for testing. No family available to provide patient collateral at this time. Will f/u with patient.

## 2025-01-01 NOTE — ASSESSMENT & PLAN NOTE
Patient with history of stroke with left sided deficits now with new right-sided weakness and slurred speech.  CT Head:   No convincing acute intracranial abnormalities or evidence of large territory acute infarct.   Suspected remote lacunar infarct in the right thalamus.  Correlation needed.   There is patchy deep white matter low density as seen with microvascular chronic ischemic changes .  This limits detection for nonhemorrhagic acute white matter lacunar type infarcts.  Additional imaging can further evaluate these findings as clinically warranted.   Pan sinus disease with air-fluid levels in the maxillary and sphenoid sinuses.     Plan:  - Neuro checks q4hr.   - Labs pending: Hgb A1c, B1, B9, B12, FTA, HIV,   - F/u MRA Brain and neck w/o Contrast  - [facility] consulted; appreciate recs  - PT/OT/SLP consulted, appreciate recs  - Holding all oral home meds, consider restarting as appropriate  - F/u UA and Ucx

## 2025-01-01 NOTE — PLAN OF CARE
01/01/25 1513   Admission   Initial VN Admission Questions Complete   Communication Issues? Technical Issue  (Audio not working on vidyo; telephone utilized for verbal communication)   Shift   Virtual Nurse - Rounding Complete   Pain Management Interventions care clustered;diversional activity provided;quiet environment facilitated;relaxation techniques promoted   Virtual Nurse - Patient Verbalized Approval Of Camera Use;VN Rounding   Type of Frequent Check   Type Patient Rounds;Telemetry Monitoring   Safety/Activity   Patient Rounds bed in low position;ID band on;bed wheels locked;placement of personal items at bedside;call light in patient/parent reach;clutter free environment maintained;visualized patient   Safety Promotion/Fall Prevention assistive device/personal item within reach;bed alarm set;Fall Risk reviewed with patient/family;Fall Risk signage in place;family expresses understanding of fall risk and prevention;family to remain at bedside;high risk medications identified;instructed to call staff for mobility;medications reviewed;side rails raised x 2;patient expresses understanding of fall risk and prevention   Positioning   Body Position position changed independently;supine   Head of Bed (HOB) Positioning HOB elevated   Pain/Comfort/Sleep   Preferred Pain Scale number (Numeric Rating Pain Scale)   Cardiac   Cardiac/Telemetry Monitor On Yes   ECG   Rhythm normal sinus rhythm     VN cued into patient's room for introduction with patient's permission.  Audio not working on [employer]. Called room and spoke with daughter Ashley. VN role explained and informed patient/family that VN would be working with bedside nurse and rest of care team.  Plan of care reviewed. Patient  advised to call for assistance at all times to ensure pt's safety.  Patient's chart, labs and vital signs reviewed.  Allowed time for questions.  Will continue to be available as needed.

## 2025-01-01 NOTE — PT/OT/SLP EVAL
Speech Language Pathology Evaluation  Bedside Swallow    Patient Name:  Stanley Granados   MRN:  [id number]  Admitting Diagnosis: Slurred speech    Recommendations:                 General Recommendations:  ongoing assessment of speech production and baseline   Diet recommendations:  Soft & Bite Sized Diet - IDDSI Level 6, Thin liquids - IDDSI Level 0   Aspiration Precautions: upright for meals, slow rate, tray set-up, whole meds   General Precautions: Standard, fall  Communication strategies:  repeat self, speak slowly, provide time to respond     Assessment:     Stanley Granados is a 84 y.o. male admitted  with slurred speech and stroke r/o who presents with dysarthria and some confusion is noted. No family available to obtain collateral hx.     History:     Past Medical History:   Diagnosis Date    Arthritis     Hypertension     Renal disorder     Stroke        History reviewed. No pertinent surgical history.    Social History: Patient lives with family at home.    Chest X-Rays: not on admit    CTA head: Atherosclerotic plaquing cavernous and supraclinoid segments of the ICAs with probable moderate narrowing.     In addition there is atherosclerotic disease in the distal V3 and proximal V4 segments of the vertebral arteries bilaterally with concern for additional moderate stenosis     No definite proximal high-grade stenosis or proximal large vessel occlusion.     CTA neck: Significant motion distorted examination.     Prominent atherosclerotic plaquing carotid bifurcations and proximal ICAs with concern for 50% right and approximately 60% left proximal ICA stenosis by NASCET criteria allowing for limitation.  This may be better assessed carotid ultrasonography.     CT head: No evidence for acute intracranial hemorrhage or definite abnormal parenchymal enhancement.   Stable hypodensities within the thalami suggestive for remote lacunar-type infarcts   Clinical correlation and further evaluation with MRI as warranted if  patient compatible     Prior diet: unknown.    Subjective     Consult received for clinical swallow eval/speech/lang eval this date, SLP did communicate with RN prior to eval/treat.    Patient goals: none stated      Pain/Comfort:  Pain Rating 1: 0/10    Respiratory Status: room air     Objective:   Pt seen in ED for swallow eval. No family at bedside to obtain hx.   Pt able to state name only. Pt is confused and has dysarthric speech, pt with general facial weakness present.   Pt was slow to follow commands. Pt needed constant repetition of info.   Pt with confusion t/o eval.     Oral Musculature Evaluation  Oral Musculature: general weakness  Dentition: scattered dentition  Secretion Management: adequate  Mucosal Quality: adequate  Mandibular Strength and Mobility: WFL  Oral Labial Strength and Mobility: WFL  Lingual Strength and Mobility: impaired strength  Buccal Strength and Mobility: decreased tone  Volitional Cough: elicited  Volitional Swallow: timely swallow  Voice Prior to PO Intake: clear voice    Bedside Swallow Eval:   Consistencies Assessed:  Thin liquids water by straw   Puree pudding   Solids tierney cracker       Oral Phase:   WFL  Increased mastication noted with dry solids   Trace oral residue cleared with subsequent swallows     Pharyngeal Phase:   no overt clinical signs/symptoms of aspiration  no overt clinical signs/symptoms of pharyngeal dysphagia  multiple spontaneous swallows    Compensatory Strategies  Multiple swallows   Alternate sips and bites  Upright for meals  Single straw sips    Treatment: Reviewed role of SLP, diet recs, attempted to ask about hx and prior speech but pt does not appear to be a credible historian.  Will speak with family when available about prior slurred speech.   Secure chat sent to primary MD team and RN with above diet recommendations and goals. Diet to be adjusted in epic.      Goals:   Multidisciplinary Problems       SLP Goals          Problem: SLP    Goal  Priority Disciplines Outcome   SLP Goal     SLP Progressing   Description: Short Term Goals:  1. Pt will participate in swallow eval to determine safest diet level.  2. Pt will tolerate dental soft tray and thin liquids with no audible dysphagia signs.   3. Pt will participate in speech/lang eval to determine baseline  4. Pt will utilize speech strategies to improve speech clarity in all contexts.   Ongoing goals pending overall improvement.                        Plan:     Patient to be seen:  3 x/week   Plan of Care expires:  01/30/25  Plan of Care reviewed with:  patient   SLP Follow-Up:  Yes       Discharge recommendations:  High Intensity Therapy   Barriers to Discharge:  none per SLP    Time Tracking:     SLP Treatment Date:   01/01/25  Speech Start Time:  1301  Speech Stop Time:  1323     Speech Total Time (min):  22 min    Billable Minutes: Eval 11  and Eval Swallow and Oral Function 11    01/01/2025

## 2025-01-01 NOTE — ASSESSMENT & PLAN NOTE
Patient reporting abdominal pain. Mild TTP over umbilicus. Patient VSS. Denies nausea and vomiting.    Plan:  - Can consider CTA abdomen and pelvis if worsening abdominal pain  - Will give rectal tylenol for abdominal and knee pain.

## 2025-01-01 NOTE — ASSESSMENT & PLAN NOTE
Patient with history of OA of both knees complaining of pain in the right knee.   XR Right knee: chronic findings stable from prior    Plan:  - Holding all oral medications 2/2 failed bedside swallow exam; restart when appropriate  - Will give rectal tylenol for abdominal and knee pain

## 2025-01-01 NOTE — SUBJECTIVE & OBJECTIVE
Past Medical History:   Diagnosis Date    Arthritis     Hypertension     Renal disorder     Stroke        History reviewed. No pertinent surgical history.    Review of patient's allergies indicates:   Allergen Reactions    Nsaids (non-steroidal anti-inflammatory drug)        No current facility-administered medications on file prior to encounter.     Current Outpatient Medications on File Prior to Encounter   Medication Sig    amLODIPine (NORVASC) 5 MG tablet Take 1 tablet (5 mg total) by mouth once daily.    carvediloL (COREG) 25 MG tablet Take 1 tablet (25 mg total) by mouth 2 (two) times daily.    ferrous gluconate (FERGON) 324 MG tablet Take 1 tablet (324 mg total) by mouth every morning.    magnesium oxide (MAG-OX) 400 mg (241.3 mg magnesium) tablet Take 1 tablet (400 mg total) by mouth once daily.    nitroGLYCERIN (NITROSTAT) 0.4 MG SL tablet Place 1 tablet (0.4 mg total) under the tongue every 5 (five) minutes as needed for Chest pain.    atorvastatin (LIPITOR) 40 MG tablet Take 1 tablet (40 mg total) by mouth once daily. (Patient not taking: Reported on 12/31/2024)    fexofenadine (ALLEGRA) 180 MG tablet Take 1 tablet (180 mg total) by mouth once daily. (Patient not taking: Reported on 12/31/2024)    potassium chloride (KLOR-CON) 8 MEQ TbSR Take 8 mEq by mouth once daily. (Patient not taking: Reported on 12/31/2024)    ranolazine (RANEXA) 1,000 mg Tb12 Take 1 tablet (1,000 mg total) by mouth 2 (two) times daily. (Patient not taking: Reported on 12/31/2024)    sodium bicarbonate 650 MG tablet Take 1 tablet (650 mg total) by mouth 2 (two) times daily. (Patient not taking: Reported on 12/31/2024)     Family History       Problem Relation (Age of Onset)    COPD Father    Diabetes Brother, Daughter, Son    Hyperlipidemia Daughter    Hypertension Mother, Brother, Daughter, Son    Lupus Daughter    Lymphoma Daughter    No Known Problems Sister, Brother          Tobacco Use    Smoking status: Former     Current  packs/day: 0.00     Types: Cigarettes     Quit date: 1994     Years since quittin.5     Passive exposure: Never    Smokeless tobacco: Never   Substance and Sexual Activity    Alcohol use: Not Currently    Drug use: Not Currently    Sexual activity: Not Currently     Review of Systems   Constitutional:  Negative for chills and fever.   Respiratory:  Negative for chest tightness and shortness of breath.    Cardiovascular:  Negative for chest pain and palpitations.   Gastrointestinal:  Positive for abdominal pain. Negative for blood in stool, constipation, diarrhea, nausea and vomiting.   Genitourinary:  Negative for dysuria.   Musculoskeletal:  Positive for arthralgias (Chronic right knee).   Neurological:  Positive for speech difficulty and weakness. Negative for dizziness, syncope and light-headedness.     Objective:     Vital Signs (Most Recent):  Temp: 98 °F (36.7 °C) (24 184)  Pulse: 70 (24)  Resp: 18 (24)  BP: (!) 154/70 (24)  SpO2: 100 % (24) Vital Signs (24h Range):  Temp:  [98 °F (36.7 °C)] 98 °F (36.7 °C)  Pulse:  [63-72] 70  Resp:  [17-33] 18  SpO2:  [99 %-100 %] 100 %  BP: (154-173)/(70-79) 154/70     Weight: 70.8 kg (156 lb)  Body mass index is 22.38 kg/m².     Physical Exam  Constitutional:       General: He is not in acute distress.     Appearance: Normal appearance. He is normal weight. He is not ill-appearing, toxic-appearing or diaphoretic.   HENT:      Head: Normocephalic and atraumatic.      Right Ear: External ear normal.      Left Ear: External ear normal.      Nose: Nose normal.      Mouth/Throat:      Mouth: Mucous membranes are moist.      Pharynx: Oropharynx is clear.   Eyes:      Extraocular Movements: Extraocular movements intact.      Conjunctiva/sclera: Conjunctivae normal.      Pupils: Pupils are equal, round, and reactive to light.   Cardiovascular:      Rate and Rhythm: Normal rate and regular rhythm.      Pulses: Normal  pulses.      Heart sounds: Normal heart sounds.   Pulmonary:      Effort: Pulmonary effort is normal. No respiratory distress.      Breath sounds: Normal breath sounds.   Abdominal:      General: Abdomen is flat. Bowel sounds are normal. There is no distension.      Palpations: Abdomen is soft.      Tenderness: There is abdominal tenderness (TTP around umbilicus).   Musculoskeletal:         General: No swelling or tenderness.      Cervical back: Normal range of motion.   Neurological:      Mental Status: He is alert.      Cranial Nerves: No cranial nerve deficit.      Sensory: No sensory deficit.      Motor: Weakness (3/5 strength in all limbs) present.      Coordination: Coordination abnormal.      Comments: Speech slurred  Can perform heel to shin and finger to nose (albeit, slowly), not able to perform rapid alternating hand test              CRANIAL NERVES     CN III, IV, VI   Pupils are equal, round, and reactive to light.       Significant Labs: All pertinent labs within the past 24 hours have been reviewed.    Significant Imaging: I have reviewed all pertinent imaging results/findings within the past 24 hours.

## 2025-01-01 NOTE — ED NOTES
Pt laying in bed, chest rising and falling. Pt's daughter at bedside. PT able  to state name and date of birth. Pt knows he is at the hospital. PT denies any pain at this time. No slurred speech noted when talking to pt.

## 2025-01-01 NOTE — ASSESSMENT & PLAN NOTE
Patient reporting abdominal pain. Mild TTP over umbilicus. Patient VSS. Denies nausea and vomiting.    Plan:  - Continue to monitor

## 2025-01-01 NOTE — ED PROVIDER NOTES
Emergency Department Encounter  Provider Note  Encounter Date: 2024    Patient Name: Stanley Granados  MRN: [id number]    History of Present Illness   HPI  History of Present Illness:    Chief Complaint:   Chief Complaint   Patient presents with    Neurologic Problem     Patient reports to the ED for evaluation of slurred speech and a possible facial droop. Last seen normal x4 days ago, Daughter states picked him up from family's house today and noticed slur. Patient has deficits from previous stroke on left side. Right sided paresis noted in triage. .     84m with a history of CVA and left-sided weakness presenting with slurred speech, drooling, difficulty moving his right side.  Has a shuffling gait.  Last seen well 4 days ago.  Denies any falls, history obtained from daughter.    The following PMH/PSH/SocHx/FamHx has been reviewed by myself:  Past Medical History:   Diagnosis Date    Arthritis     Hypertension     Renal disorder     Stroke      History reviewed. No pertinent surgical history.  Social History     Tobacco Use    Smoking status: Former     Current packs/day: 0.00     Types: Cigarettes     Quit date: 1994     Years since quittin.6     Passive exposure: Never    Smokeless tobacco: Never   Substance Use Topics    Alcohol use: Not Currently    Drug use: Not Currently     Family History   Problem Relation Name Age of Onset    Hypertension Mother      COPD Father      No Known Problems Sister Zunilda     No Known Problems Brother Rowdyyaima Hough     Hypertension Brother Jaxon     Diabetes Brother Jaxon     Hypertension Daughter x3     Hyperlipidemia Daughter x3     Diabetes Daughter x3     Lupus Daughter x3     Lymphoma Daughter x1     Hypertension Son x2     Diabetes Son x2      Allergies reviewed:  Review of patient's allergies indicates:   Allergen Reactions    Nsaids (non-steroidal anti-inflammatory drug)      Medications reviewed:  Medication List with Changes/Refills   Current  Medications    AMLODIPINE (NORVASC) 5 MG TABLET    Take 1 tablet (5 mg total) by mouth once daily.    ATORVASTATIN (LIPITOR) 40 MG TABLET    Take 1 tablet (40 mg total) by mouth once daily.    CARVEDILOL (COREG) 25 MG TABLET    Take 1 tablet (25 mg total) by mouth 2 (two) times daily.    FERROUS GLUCONATE (FERGON) 324 MG TABLET    Take 1 tablet (324 mg total) by mouth every morning.    FEXOFENADINE (ALLEGRA) 180 MG TABLET    Take 1 tablet (180 mg total) by mouth once daily.    MAGNESIUM OXIDE (MAG-OX) 400 MG (241.3 MG MAGNESIUM) TABLET    Take 1 tablet (400 mg total) by mouth once daily.    NITROGLYCERIN (NITROSTAT) 0.4 MG SL TABLET    Place 1 tablet (0.4 mg total) under the tongue every 5 (five) minutes as needed for Chest pain.    POTASSIUM CHLORIDE (KLOR-CON) 8 MEQ TBSR    Take 8 mEq by mouth once daily.    RANOLAZINE (RANEXA) 1,000 MG TB12    Take 1 tablet (1,000 mg total) by mouth 2 (two) times daily.    SODIUM BICARBONATE 650 MG TABLET    Take 1 tablet (650 mg total) by mouth 2 (two) times daily.       Physical Exam     Initial Vitals [12/31/24 1841]   BP Pulse Resp Temp SpO2   (!) 157/73 63 18 98 °F (36.7 °C) 99 %      MAP       --           Triage vital signs reviewed.    Constitutional: Well-nourished, well-developed. Not in acute distress.  HENT: Normocephalic, atraumatic. Moist mucous membranes.  Eyes: No conjunctival injection.  Resp: Normal respiratory effort, breathing unlabored.  Cardio: Regular rate and rhythm.  GI: Abdomen non-distended.   MSK: Extremities without any deformities noted. No lower extremity edema.  Skin: Warm and dry. No rashes or lesions noted.  Neuro: Awake and alert. L sided residual deficits. 1/5 RUE, 1/5 RLE. L facial droop. Slurred speech.     ED Course   Procedures    Medical Decision Making    History Acquisition     The history is provided by the patient.  Limited due to slurred speech  Assessment requiring an independent historian and why historian was needed:  Daughter at  bedside giving history    Review of prior external/non ED notes:  Admitted 11/20/2024 for left-sided chest pain, but basilar airspace disease/treated for pneumonia    Differential Diagnoses   Based on available information and initial assessment, the working Differential Diagnosis includes, but is not limited to:  CVA/TIA, intracranial mass/hemorrhage, head trauma, seizure, status epilepticus, post-ictal state, meningitis/encephalitis, sepsis, MI/ACS, arrhythmia, syncope,   anaphylaxis, thyroid disease, neuroleptic malignant syndrome, serotonin syndrome, CO poisoning, hypoxia/hypercapnea, uremic/hepatic encephalopathy, medication reaction, intentional overdose, metabolic derangement, psychiatric disturbance, substance abuse, alcohol intoxication/withdrawal, hypoglycemia/hyperglycemia, complicated migraine.    EKG   EKG ordered and independently reviewed by me:   Sinus rhythm, rate 72, first-degree AV block, normal axis, no STEMI    Labs   Lab tests ordered and independently reviewed by me:    Labs Reviewed   CBC W/ AUTO DIFFERENTIAL - Abnormal       Result Value    WBC 5.77      RBC 3.54 (*)     Hemoglobin 10.2 (*)     Hematocrit 30.2 (*)     MCV 85      MCH 28.8      MCHC 33.8      RDW 16.0 (*)     Platelets 161      MPV 12.3      Immature Granulocytes 0.2      Gran # (ANC) 2.9      Immature Grans (Abs) 0.01      Lymph # 2.1      Mono # 0.7      Eos # 0.1      Baso # 0.02      nRBC 0      Gran % 49.5      Lymph % 35.5      Mono % 12.1      Eosinophil % 2.4      Basophil % 0.3      Differential Method Automated     COMPREHENSIVE METABOLIC PANEL - Abnormal    Sodium 139      Potassium 4.3      Chloride 107      CO2 20 (*)     Glucose 95      BUN 22      Creatinine 1.3      Calcium 9.1      Total Protein 7.6      Albumin 3.6      Total Bilirubin 0.4      Alkaline Phosphatase 73      AST 17      ALT 14      eGFR 55 (*)     Anion Gap 12     POCT GLUCOSE - Abnormal    POCT Glucose 121 (*)    INFLUENZA A & B BY MOLECULAR     Influenza A, Molecular Negative      Influenza B, Molecular Negative      Flu A & B Source NP     PROTIME-INR    Prothrombin Time 11.2      INR 1.0     TSH    TSH 3.304     LIPID PANEL    Cholesterol 142      Triglycerides 64      HDL 45      LDL Cholesterol 84.2      HDL/Cholesterol Ratio 31.7      Total Cholesterol/HDL Ratio 3.2      Non-HDL Cholesterol 97     TROPONIN I    Troponin I <0.006     SARS-COV-2 RNA AMPLIFICATION, QUAL    SARS-CoV-2 RNA, Amplification, Qual Negative     URINALYSIS, REFLEX TO URINE CULTURE   URINALYSIS, REFLEX TO URINE CULTURE       Imaging   Imaging ordered and independently reviewed by me:   Imaging Results              X-Ray Knee 3 View Right (Final result)  Result time 12/31/24 23:35:57      Final result by Ariel Luo MD (12/31/24 23:35:57)                   Impression:      No acute fracture.    Chronic findings, similar to prior.      Electronically signed by: Ariel Luo MD  Date:    12/31/2024  Time:    23:35               Narrative:    EXAMINATION:  XR KNEE 3 VIEW RIGHT    CLINICAL HISTORY:  Pain, unspecified    TECHNIQUE:  AP, lateral, and Merchant views of the right knee were performed.    COMPARISON:  02/22/2022.    FINDINGS:  No fracture or dislocation.  Possible small suprapatellar effusion, similar to prior.  Advanced/severe tricompartment degenerative change, similar compared to prior.  Vascular calcifications, similar to prior.                                       CT Head Without Contrast (Final result)  Result time 12/31/24 20:45:22      Final result by Gisela Manuel MD (12/31/24 20:45:22)                   Impression:      No convincing acute intracranial abnormalities or evidence of large territory acute infarct.    Suspected remote lacunar infarct in the right thalamus.  Correlation needed.    There is patchy deep white matter low density as seen with microvascular chronic ischemic changes .  This limits detection for nonhemorrhagic acute  white matter lacunar type infarcts.  Additional imaging can further evaluate these findings as clinically warranted.    Pan sinus disease with air-fluid levels in the maxillary and sphenoid sinuses.      Electronically signed by: Gisela Manuel  Date:    12/31/2024  Time:    20:45               Narrative:    EXAMINATION:  CT HEAD WITHOUT CONTRAST    CLINICAL HISTORY:  Neuro deficit, acute, stroke suspected;    TECHNIQUE:  Low dose axial images were obtained through the head.  Coronal and sagittal reformations were also performed. Contrast was not administered.    COMPARISON:  None.    FINDINGS:  There is no acute intra or extra-axial hemorrhage or hematoma.  The gray-white matter junction differentiation is intact with no evidence of acute major arterial infarct.  There is no evidence of mass effect/midline shift.  There is prominence of the ventricles, cisterns and sulci consistent with senescent atrophic changes and the patient's age.    Patchy low density in the deep white matter as seen with microvascular chronic ischemic changes.  There is lacunar infarct in the right thalamus appearing fairly well-defined suggesting remote lacunar infarct.    There is opacification of the maxillary sinuses is more so on the left.  Mucosal thickening is noted in the sphenoid and ethmoid sinuses is bilaterally.  Mastoid air cells middle ears are clear.  Orbital structures appear grossly intact and symmetric.  Bony calvarium is intact.                                         Additional Consideration   Stanley Granados  presents to the Emergency Department today with concern for stroke-like symptoms, last seen well 4 days ago.  Patient is having difficulty handling all his secretions, but able to clear, will make NPO.  Will likely need admission for speech and swallow, PT/OT.  Stroke workup.    Additional testing considered but not indicated during the course of this workup: further imaging and labwork, not  indicated  Co-morbidities/chronic illness/exacerbation of chronic illness considered which impacted clinical decision making:  Previous CVA, age  Procedures done in the ED or plan for the OR: No  Social determinants of care considered during development of treatment plan include: Decreased medical literacy  Discussion of management or test interpretation with external provider: Yes, describe:  See ED course  DNR discussion: No    The patient's list of active medications and allergies as documented per RN staff has been reviewed.  Medications given in the ED and/or prescribed:   Medications   sodium chloride 0.9% flush 5 mL (has no administration in time range)   melatonin tablet 6 mg (has no administration in time range)   ondansetron injection 4 mg (has no administration in time range)   polyethylene glycol packet 17 g (has no administration in time range)   acetaminophen tablet 650 mg (has no administration in time range)   naloxone 0.4 mg/mL injection 0.02 mg (has no administration in time range)   glucose chewable tablet 16 g (has no administration in time range)   glucose chewable tablet 24 g (has no administration in time range)   dextrose 50% injection 12.5 g (has no administration in time range)   dextrose 50% injection 25 g (has no administration in time range)   glucagon (human recombinant) injection 1 mg (has no administration in time range)   enoxaparin injection 40 mg (has no administration in time range)   senna-docusate 8.6-50 mg per tablet 1 tablet (has no administration in time range)   sodium chloride 0.9% bolus 500 mL 500 mL (has no administration in time range)   labetaloL injection 10 mg (has no administration in time range)   acetaminophen suppository 650 mg (has no administration in time range)             ED Course as of 01/01/25 0035   Tue Dec 31, 2024   2242 Sign-out given to [facility] family Medicine, stroke workup [CS]   2242 SARS-CoV-2 RNA, Amplification, Qual: Negative [CS]   2242 TSH: 3.304  [CS]   2242 Comprehensive metabolic panel(!)  Independently interpreted by me; unremarkable or consistent with the patient's baseline   [CS]   2242 Influenza A, Molecular: Negative [CS]   2242 Influenza B, Molecular: Negative [CS]   2242 LDL - Lipid Panel [CS]   2242 Troponin I: <0.006 [CS]   2243 CBC W/ AUTO DIFFERENTIAL(!)  Independently interpreted by me; unremarkable or consistent with the patient's baseline   [CS]   2243 CT Head Without Contrast  No acute findings [CS]   Wed Jan 01, 2025   0035 Sign-out given to [facility] family Medicine, needs MRI in the morning, PT/OT/speech [CS]      ED Course User Index  [CS] Sylvia Mi MD       Explanation of disposition: Admit    Clinical Impression:     1. Altered mental status, unspecified altered mental status type    2. Acute focal neurological deficit    3. Pain         ED Disposition Condition    Admit                Sylvia Mi MD  01/01/25 0035

## 2025-01-01 NOTE — SUBJECTIVE & OBJECTIVE
Interval History: Patient doing well this morning.  Working with PT/OT.  Patient's speech more legible.  We will follow up imaging and lab work.    Review of Systems   Constitutional:  Negative for chills and fever.   Respiratory:  Negative for shortness of breath.    Cardiovascular:  Negative for chest pain.   Gastrointestinal:  Negative for abdominal pain, nausea and vomiting.   Neurological:  Negative for dizziness and light-headedness.     Objective:     Vital Signs (Most Recent):  Temp: 98 °F (36.7 °C) (12/31/24 1841)  Pulse: (!) 59 (01/01/25 0801)  Resp: (!) 21 (01/01/25 0801)  BP: (!) 140/61 (01/01/25 0801)  SpO2: 99 % (01/01/25 0801) Vital Signs (24h Range):  Temp:  [98 °F (36.7 °C)] 98 °F (36.7 °C)  Pulse:  [57-72] 59  Resp:  [17-33] 21  SpO2:  [98 %-100 %] 99 %  BP: (126-173)/(60-80) 140/61     Weight: 70.8 kg (156 lb)  Body mass index is 22.38 kg/m².    Intake/Output Summary (Last 24 hours) at 1/1/2025 0848  Last data filed at 1/1/2025 0140  Gross per 24 hour   Intake --   Output 440 ml   Net -440 ml         Physical Exam  Constitutional:       General: He is not in acute distress.     Appearance: He is not toxic-appearing.   HENT:      Head: Normocephalic and atraumatic.      Nose: Nose normal.   Cardiovascular:      Pulses: Normal pulses.      Heart sounds: Normal heart sounds.   Pulmonary:      Effort: Pulmonary effort is normal.      Breath sounds: Normal breath sounds.   Skin:     General: Skin is warm and dry.   Neurological:      Mental Status: He is alert.   Psychiatric:         Mood and Affect: Mood normal.             Significant Labs: All pertinent labs within the past 24 hours have been reviewed.  CBC:   Recent Labs   Lab 12/31/24 1930   WBC 5.77   HGB 10.2*   HCT 30.2*        CMP:   Recent Labs   Lab 12/31/24 1930      K 4.3      CO2 20*   GLU 95   BUN 22   CREATININE 1.3   CALCIUM 9.1   PROT 7.6   ALBUMIN 3.6   BILITOT 0.4   ALKPHOS 73   AST 17   ALT 14   ANIONGAP 12        Significant Imaging: I have reviewed all pertinent imaging results/findings within the past 24 hours.

## 2025-01-01 NOTE — ED NOTES
Pt reports right knee pain/right leg pain and left arm pain ongoing, MD Mi aware, awaiting response at this time.

## 2025-01-01 NOTE — PT/OT/SLP EVAL
Physical Therapy Evaluation    Patient Name:  Stanley Granados   MRN:  [id number]    Recommendations:     Discharge Recommendations: High Intensity Therapy   Discharge Equipment Recommendations: other (see comments) (TBD by next level of care)   Barriers to discharge:  decreased functional mobility, requiring increased assistance    Assessment:     Stanley Granados is a 84 y.o. male admitted with a medical diagnosis of Slurred speech.  He presents with the following impairments/functional limitations: weakness, impaired endurance, impaired sensation, impaired self care skills, impaired functional mobility, gait instability, impaired balance, decreased lower extremity function, decreased upper extremity function, decreased safety awareness, pain, impaired coordination, impaired fine motor.    PT Eval received and completed. Patient's PLOF is mod I with transfers and gait with rollator however questionable accuracy. Patient able to communicate however difficulty understanding his speech which improved throughout evaluation however still limited. Patient currently requiring moderate assist with sit to/from supine, moderate assist with sit to/from stand transition, and minimal assist with gait x 5 feet forward/backward and side stepping with patient experiencing pain at right knee. Patient presents with right knee flexion contracture possibly contributing to chronic right knee pain. This patient would benefit from HIT skilled physical therapy while admitted in the hospital to improve bilateral LE MMT grossly, to improve functional independence with transfers and gait, and to improve balance and decrease fall risk.     Rehab Prognosis: Good; patient would benefit from acute skilled PT services to address these deficits and reach maximum level of function.    Recent Surgery: * No surgery found *      Plan:     During this hospitalization, patient to be seen 6 x/week to address the identified rehab impairments via gait training,  "therapeutic activities, therapeutic exercises, neuromuscular re-education and progress toward the following goals:    Plan of Care Expires:  02/01/25    Subjective     Chief Complaint: pain at left shoulder and right knee  Patient/Family Comments/goals: "be able to get back home with my daughter"  Pain/Comfort:  Pain Rating 1:  (no number provided)  Location - Side 1: Right  Location - Orientation 1: generalized  Location 1: knee  Pain Addressed 1: Reposition, Distraction, Cessation of Activity    Patients cultural, spiritual, Uatsdin conflicts given the current situation: no    Living Environment: patient lives with daughter in [facility] with walk in shower and grabbars  Prior to admission, patients level of function was mod I with ADLS and gait with rollator, unsure of accuracy.  Equipment used at home: walker, rolling, rollator, shower chair.  DME owned (not currently used): none.  Upon discharge, patient will have assistance from facility staff.    Objective:     Communicated with SACHI Calderon prior to session.  Patient found HOB elevated with blood pressure cuff, oxygen, peripheral IV, pulse ox (continuous), telemetry  upon PT entry to room.    General Precautions: Standard, fall, aspiration, aphasia  Orthopedic Precautions:N/A   Braces: N/A  Respiratory Status: Room air    Exams:  Cognitive Exam:  Patient is oriented to Person, Place, and Time  RLE ROM: Deficits: right knee flexion contracture  RLE Strength: Deficits: right hip 4-/5, right knee 4-/5, and right ankle 4/5 MMT grossly  LLE ROM: WFL  LLE Strength: Deficits: left hip 4-/5, left knee 4-/5, and left ankle 4/5 MMT grossly    Functional Mobility:  Bed Mobility:     Supine to Sit: moderate assistance  Sit to Supine: moderate assistance  Transfers:     Sit to Stand:  moderate assistance with rolling walker  Gait: patient was able to ambulate 5 feet forward/backward and sidestepping with RW with minimal assist with reports or right knee pain with antalgic gait " including decreased weightbearing and stance through right LE with forward flexion of trunk and increased UE support  Balance: poor seated balance with CGA with posterior trunk lean, poor standing balance with minimal assist for safety with patient demonstrating wide CONY with stance      AM-PAC 6 CLICK MOBILITY  Total Score:14       Treatment & Education:  PT reviewed PT role and POC. PT reviewed safety with proper hand placement and sequencing with transfers and gait with RW. PT reviewed patient to complete initial HEP including ankle pumps, heel slides, SLR, LAQS, and  hip flexion with patient demonstrating understanding. PT reviewed postural awareness to ensure upright trunk and midline posture.     Patient left HOB elevated with all lines intact, call button in reach, bed alarm on, and SACHI Calderon notified.    GOALS:   Multidisciplinary Problems       Physical Therapy Goals          Problem: Physical Therapy    Goal Priority Disciplines Outcome Interventions   Physical Therapy Goal     PT, PT/OT Progressing    Description: Goals to be met by: 2025     Patient will increase functional independence with mobility by performin. Supine to sit with Supervision/Mod I  2. Sit to supine with Supervision/Mod I  3. Sit to stand transfer with Supervision  4. Gait  x 50 feet with Supervision using Rolling Walker.                          History:     Past Medical History:   Diagnosis Date    Arthritis     Hypertension     Renal disorder     Stroke        History reviewed. No pertinent surgical history.    Time Tracking:     PT Received On: 25  PT Start Time: 813     PT Stop Time: 08  PT Total Time (min): 21 min     Billable Minutes: Evaluation 10 minutes and Gait Training 11 minutes - Adela Ochoa with OT      2025

## 2025-01-01 NOTE — ED NOTES
Pt straight cathed with order from provider. Immediate return of yellow clear non odorous urine. Pt tolerated well. Urine sample collected and sent to lab.

## 2025-01-01 NOTE — CONSULTS
"  [facility] - Telemetry  Adult Nutrition  Consult Note    SUMMARY     Recommendations    Recommendation:  1. Encourage intake at meals as tolerated. 2. Monitor weight/labs.   3. Monitor need for supplements.   4. RD to follow to monitor po intake    Goals:  Pt will tolerate diet with at least 50-75% intake at meals by RD follow up  Nutrition Goal Status: new  Communication of RD Recs: reviewed with RN    Assessment and Plan  No nutrition dx at this time     Malnutrition Assessment  Weight Loss (Malnutrition):  (2% x 7 months)       Reason for Assessment  Reason For Assessment: consult (stroke pathway)  Diagnosis:  (slurred speech)  General Information Comments: Pt admitted with slurred speech and facial droop. Pt was in ED at time of visit-unable to assess NFPE. NOted pt now on soft & bite sized diet per SLP recs. Noted 4lb weight loss x 7 months. Low sodium diet handouts attached to d/c paperwork.  Nutrition Discharge Planning: d/c diet per SLP    Past Medical History:   Diagnosis Date    Arthritis     Hypertension     Renal disorder     Stroke       Nutrition/Diet History  Food Preferences: no Baptism or cultural food prefs identified  Spiritual, Cultural Beliefs, Yazidi Practices, Values that Affect Care: no    Anthropometrics  Temp: 97.5 °F (36.4 °C)  Height Method: Stated  Height: 5' 10" (177.8 cm)  Height (inches): 70 in  Weight Method: Bed Scale  Weight: 70.8 kg (156 lb 1.4 oz)  Weight (lb): 156.09 lb  Ideal Body Weight (IBW), Male: 166 lb  % Ideal Body Weight, Male (lb): 94.03 %  BMI (Calculated): 22.4  BMI Grade: 18.5-24.9 - normal  Usual Body Weight (UBW), k.3 kg (24)  % Usual Body Weight: 98.13  % Weight Change From Usual Weight: -2.08 %     Lab/Procedures/Meds  Pertinent Labs Reviewed: reviewed  Pertinent Labs Comments: reviewed  Pertinent Medications Reviewed: reviewed  Pertinent Medications Comments: tylenol, enoxaparin    Estimated/Assessed Needs  Weight Used For Calorie Calculations: 70.8 " kg (156 lb 1.4 oz)  Energy Calorie Requirements (kcal): 1982 (28 kcal/kg)  Energy Need Method: Kcal/kg  Protein Requirements: 70g (1.0g/kg)  Weight Used For Protein Calculations: 70.8 kg (156 lb 1.4 oz)  Estimated Fluid Requirement Method: RDA Method  RDA Method (mL): 1982     Nutrition Prescription Ordered  Current Diet Order: soft & bite sized    Evaluation of Received Nutrient/Fluid Intake  I/O: 0/440  Energy Calories Required: not meeting needs  Protein Required: not meeting needs  Fluid Required: not meeting needs  Comments: LBM unknown  % Intake of Estimated Energy Needs: Other: intake not yet recorded  % Meal Intake: Other: intake not yet recorded    Nutrition Risk  Level of Risk/Frequency of Follow-up:  (2xweekly)     Monitor and Evaluation  Food and Nutrient Intake: food and beverage intake  Food and Nutrient Adminstration: diet order  Physical Activity and Function: nutrition-related ADLs and IADLs  Anthropometric Measurements: weight  Biochemical Data, Medical Tests and Procedures: electrolyte and renal panel  Nutrition-Focused Physical Findings: overall appearance     Nutrition Related Social Determinants of Health: SDOH: Adequate food in home environment     Nutrition Follow-Up  RD Follow-up?: Yes

## 2025-01-01 NOTE — ED NOTES
"Pt unable to states dgt's name, but able to identify her stating, "that's my daughter.". Pt confused on stating his  year, verbalizes  instead of . Needs reorientation to year, unable to state year. Pt smiling and laughing in response to not being able to verbalize correct answers. Calm and cooperative. Dgt at bedside name, Ashley.  "

## 2025-01-01 NOTE — PROGRESS NOTES
[facility]  [facility] Medicine  Progress Note    Patient Name: Stanley Granados  MRN: [id number]  Patient Class: IP- Inpatient   Admission Date: 12/31/2024  Length of Stay: 1 days  Attending Physician: No att. providers found  Primary Care Provider: Phuong Umaña MD        Subjective     Principal Problem:Slurred speech        HPI:  Mr. Stanley Granados is an 85 y/o male with a PMHx of stroke with residual left-sided weakness, HTN, CKD 3b, and arthritis presents to the ED 12/31/24 with complaints of slurred speech and right-sided weakness. Per chart review, daughter at bedside during initial ED exam reported his last known normal was 4 days ago. He reportedly has a shuffling gait and denies any falls. Upon my assessment, patient with slurred speech limits history taking but he reports that his abdomen hurts. Denies nausea, vomiting, headache, chest pain, SOB, syncope.     In the ED, vitals notable for hypertension (170s/70s), CBC and CMP WNL, Trop negative, Lipid panel WNL, TSH WNL, Flu/Covid negative, CT Head: No convincing acute intracranial abnormalities or evidence of large territory acute infarct. Suspected remote lacunar infarct in the right thalamus.  Correlation needed. There is patchy deep white matter low density as seen with microvascular chronic ischemic changes .  This limits detection for nonhemorrhagic acute white matter lacunar type infarcts.  Additional imaging can further evaluate these findings as clinically warranted. Pan sinus disease with air-fluid levels in the maxillary and sphenoid sinuses. Per ED, patient did not pass bedside swallow. [facility] asked to admit for stroke rule out.     Overview/Hospital Course:  No notes on file    Interval History: Patient doing well this morning.  Working with PT/OT.  Patient's speech more legible.  We will follow up imaging and lab work.    Review of Systems   Constitutional:  Negative for chills and fever.   Respiratory:  Negative for  shortness of breath.    Cardiovascular:  Negative for chest pain.   Gastrointestinal:  Negative for abdominal pain, nausea and vomiting.   Neurological:  Negative for dizziness and light-headedness.     Objective:     Vital Signs (Most Recent):  Temp: 98 °F (36.7 °C) (12/31/24 1841)  Pulse: (!) 59 (01/01/25 0801)  Resp: (!) 21 (01/01/25 0801)  BP: (!) 140/61 (01/01/25 0801)  SpO2: 99 % (01/01/25 0801) Vital Signs (24h Range):  Temp:  [98 °F (36.7 °C)] 98 °F (36.7 °C)  Pulse:  [57-72] 59  Resp:  [17-33] 21  SpO2:  [98 %-100 %] 99 %  BP: (126-173)/(60-80) 140/61     Weight: 70.8 kg (156 lb)  Body mass index is 22.38 kg/m².    Intake/Output Summary (Last 24 hours) at 1/1/2025 0848  Last data filed at 1/1/2025 0140  Gross per 24 hour   Intake --   Output 440 ml   Net -440 ml         Physical Exam  Constitutional:       General: He is not in acute distress.     Appearance: He is not toxic-appearing.   HENT:      Head: Normocephalic and atraumatic.      Nose: Nose normal.   Cardiovascular:      Pulses: Normal pulses.      Heart sounds: Normal heart sounds.   Pulmonary:      Effort: Pulmonary effort is normal.      Breath sounds: Normal breath sounds.   Skin:     General: Skin is warm and dry.   Neurological:      Mental Status: He is alert.   Psychiatric:         Mood and Affect: Mood normal.             Significant Labs: All pertinent labs within the past 24 hours have been reviewed.  CBC:   Recent Labs   Lab 12/31/24 1930   WBC 5.77   HGB 10.2*   HCT 30.2*        CMP:   Recent Labs   Lab 12/31/24 1930      K 4.3      CO2 20*   GLU 95   BUN 22   CREATININE 1.3   CALCIUM 9.1   PROT 7.6   ALBUMIN 3.6   BILITOT 0.4   ALKPHOS 73   AST 17   ALT 14   ANIONGAP 12       Significant Imaging: I have reviewed all pertinent imaging results/findings within the past 24 hours.    Assessment and Plan     * Slurred speech  Patient with history of stroke with left sided deficits now with new right-sided weakness and  slurred speech.  CT Head:   No convincing acute intracranial abnormalities or evidence of large territory acute infarct.   Suspected remote lacunar infarct in the right thalamus.  Correlation needed.   There is patchy deep white matter low density as seen with microvascular chronic ischemic changes .  This limits detection for nonhemorrhagic acute white matter lacunar type infarcts.  Additional imaging can further evaluate these findings as clinically warranted.   Pan sinus disease with air-fluid levels in the maxillary and sphenoid sinuses.     Plan:  - Neuro checks q4hr.   - Labs pending: Hgb A1c, B1, B9, B12, FTA, HIV,   - F/u MRA Brain and neck w/o Contrast  - [facility] consulted; appreciate recs  - PT/OT/SLP consulted, appreciate recs  - Holding all oral home meds, consider restarting as appropriate  - F/u UA and Ucx    Abdominal pain  Patient reporting abdominal pain. Mild TTP over umbilicus. Patient VSS. Denies nausea and vomiting.    Plan:  - Continue to monitor        Hypertension  Patient's blood pressure range in the last 24 hours was: BP  Min: 154/70  Max: 173/79.The patient's inpatient anti-hypertensive regimen is listed below:  Current Antihypertensives  labetaloL injection 10 mg, Every 15 min PRN, Intravenous    Plan  - BP is controlled, no changes needed to their regimen  - Holding all oral home meds 2/2 failed bedside swallow test  - Permissive HTN protocol in place    Stage 3b chronic kidney disease  Creatine stable for now. BMP reviewed- noted Estimated Creatinine Clearance: 43.9 mL/min (based on SCr of 1.3 mg/dL). according to latest data. Based on current GFR, CKD stage is stage 3 - GFR 30-59.  Monitor UOP and serial BMP and adjust therapy as needed. Renally dose meds. Avoid nephrotoxic medications and procedures.    Plan:  - Continue to monitor    Osteoarthritis of both knees  Patient with history of OA of both knees complaining of pain in the right knee.   XR Right knee: chronic findings  stable from prior    Plan:  - Holding all oral medications 2/2 failed bedside swallow exam; restart when appropriate  - Will give rectal tylenol for abdominal and knee pain      VTE Risk Mitigation (From admission, onward)           Ordered     enoxaparin injection 40 mg  Daily         12/31/24 2258     IP VTE HIGH RISK PATIENT  Once         12/31/24 2258     Place sequential compression device  Until discontinued         12/31/24 2258                    Discharge Planning   VERONICA:      Code Status: Full Code   Medical Readiness for Discharge Date:                    Please place Justification for DME        Kali Cummings MD  Department of Hospital Medicine   [facility] - Emergency Dept

## 2025-01-01 NOTE — ASSESSMENT & PLAN NOTE
Patient's blood pressure range in the last 24 hours was: BP  Min: 154/70  Max: 173/79.The patient's inpatient anti-hypertensive regimen is listed below:  Current Antihypertensives  labetaloL injection 10 mg, Every 15 min PRN, Intravenous    Plan  - BP is controlled, no changes needed to their regimen  - Holding all oral home meds 2/2 failed bedside swallow test  - Permissive HTN protocol in place

## 2025-01-01 NOTE — ASSESSMENT & PLAN NOTE
Creatine stable for now. BMP reviewed- noted Estimated Creatinine Clearance: 43.9 mL/min (based on SCr of 1.3 mg/dL). according to latest data. Based on current GFR, CKD stage is stage 3 - GFR 30-59.  Monitor UOP and serial BMP and adjust therapy as needed. Renally dose meds. Avoid nephrotoxic medications and procedures.    Plan:  - Continue to monitor

## 2025-01-01 NOTE — HPI
Mr. Stanley Granados is an 83 y/o male with a PMHx of stroke with residual left-sided weakness, HTN, CKD 3b, and arthritis presents to the ED 12/31/24 with complaints of slurred speech and right-sided weakness. Per chart review, daughter at bedside during initial ED exam reported his last known normal was 4 days ago. He reportedly has a shuffling gait and denies any falls. Upon my assessment, patient with slurred speech limits history taking but he reports that his abdomen hurts. Denies nausea, vomiting, headache, chest pain, SOB, syncope.     In the ED, vitals notable for hypertension (170s/70s), CBC and CMP WNL, Trop negative, Lipid panel WNL, TSH WNL, Flu/Covid negative, CT Head: No convincing acute intracranial abnormalities or evidence of large territory acute infarct. Suspected remote lacunar infarct in the right thalamus.  Correlation needed. There is patchy deep white matter low density as seen with microvascular chronic ischemic changes .  This limits detection for nonhemorrhagic acute white matter lacunar type infarcts.  Additional imaging can further evaluate these findings as clinically warranted. Pan sinus disease with air-fluid levels in the maxillary and sphenoid sinuses. Per ED, patient did not pass bedside swallow. [facility] asked to admit for stroke rule out.

## 2025-01-01 NOTE — PHARMACY MED REC
"  [facility]           Pharmacy  Admission Medication History     The home medication history was taken by Hiwot Lancaster.      Medication history obtained from Medications listed below were obtained from: [employer]    Based on information gathered for medication list, you may go to "Admission" then "Reconcile Home Medications" tabs to review and/or act upon those items.     The home medication list has been updated by the Pharmacy department.   Please read ALL comments highlighted in yellow.   Please address this information as you see fit.    Feel free to contact us if you have any questions or require assistance.        No current facility-administered medications on file prior to encounter.     Current Outpatient Medications on File Prior to Encounter   Medication Sig Dispense Refill    amLODIPine (NORVASC) 5 MG tablet Take 1 tablet (5 mg total) by mouth once daily. 90 tablet 3    carvediloL (COREG) 25 MG tablet Take 1 tablet (25 mg total) by mouth 2 (two) times daily. 180 tablet 3    ferrous gluconate (FERGON) 324 MG tablet Take 1 tablet (324 mg total) by mouth every morning. 90 tablet 3    magnesium oxide (MAG-OX) 400 mg (241.3 mg magnesium) tablet Take 1 tablet (400 mg total) by mouth once daily. 90 tablet 3    nitroGLYCERIN (NITROSTAT) 0.4 MG SL tablet Place 1 tablet (0.4 mg total) under the tongue every 5 (five) minutes as needed for Chest pain. 25 tablet 3       Please address this information as you see fit.  Feel free to contact us if you have any questions or require assistance.    Hiwot Lancaster  [phone]                .          "

## 2025-01-01 NOTE — H&P
[facility]  History & Physical    Patient Name: Stanley Granados  MRN: [id number]  Patient Class: IP- Inpatient  Admission Date: 12/31/2024  Attending Physician: Sylvia Mi MD   Primary Care Provider: Phuong Umaña MD         Patient information was obtained from patient, past medical records, and ER records.     Subjective:     Principal Problem:Slurred speech    Chief Complaint:   Chief Complaint   Patient presents with    Neurologic Problem     Patient reports to the ED for evaluation of slurred speech and a possible facial droop. Last seen normal x4 days ago, Daughter states picked him up from family's house today and noticed slur. Patient has deficits from previous stroke on left side. Right sided paresis noted in triage. .        HPI: Mr. Stanley Granados is an 83 y/o male with a PMHx of stroke with residual left-sided weakness, HTN, CKD 3b, and arthritis presents to the ED 12/31/24 with complaints of slurred speech and right-sided weakness. Per chart review, daughter at bedside during initial ED exam reported his last known normal was 4 days ago. He reportedly has a shuffling gait and denies any falls. Upon my assessment, patient with slurred speech limits history taking but he reports that his abdomen hurts. Denies nausea, vomiting, headache, chest pain, SOB, syncope.     In the ED, vitals notable for hypertension (170s/70s), CBC and CMP WNL, Trop negative, Lipid panel WNL, TSH WNL, Flu/Covid negative, CT Head: No convincing acute intracranial abnormalities or evidence of large territory acute infarct. Suspected remote lacunar infarct in the right thalamus.  Correlation needed. There is patchy deep white matter low density as seen with microvascular chronic ischemic changes .  This limits detection for nonhemorrhagic acute white matter lacunar type infarcts.  Additional imaging can further evaluate these findings as clinically warranted. Pan sinus disease with air-fluid  levels in the maxillary and sphenoid sinuses. Per ED, patient did not pass bedside swallow. [facility] asked to admit for stroke rule out.     Past Medical History:   Diagnosis Date    Arthritis     Hypertension     Renal disorder     Stroke        History reviewed. No pertinent surgical history.    Review of patient's allergies indicates:   Allergen Reactions    Nsaids (non-steroidal anti-inflammatory drug)        No current facility-administered medications on file prior to encounter.     Current Outpatient Medications on File Prior to Encounter   Medication Sig    amLODIPine (NORVASC) 5 MG tablet Take 1 tablet (5 mg total) by mouth once daily.    carvediloL (COREG) 25 MG tablet Take 1 tablet (25 mg total) by mouth 2 (two) times daily.    ferrous gluconate (FERGON) 324 MG tablet Take 1 tablet (324 mg total) by mouth every morning.    magnesium oxide (MAG-OX) 400 mg (241.3 mg magnesium) tablet Take 1 tablet (400 mg total) by mouth once daily.    nitroGLYCERIN (NITROSTAT) 0.4 MG SL tablet Place 1 tablet (0.4 mg total) under the tongue every 5 (five) minutes as needed for Chest pain.    atorvastatin (LIPITOR) 40 MG tablet Take 1 tablet (40 mg total) by mouth once daily. (Patient not taking: Reported on 12/31/2024)    fexofenadine (ALLEGRA) 180 MG tablet Take 1 tablet (180 mg total) by mouth once daily. (Patient not taking: Reported on 12/31/2024)    potassium chloride (KLOR-CON) 8 MEQ TbSR Take 8 mEq by mouth once daily. (Patient not taking: Reported on 12/31/2024)    ranolazine (RANEXA) 1,000 mg Tb12 Take 1 tablet (1,000 mg total) by mouth 2 (two) times daily. (Patient not taking: Reported on 12/31/2024)    sodium bicarbonate 650 MG tablet Take 1 tablet (650 mg total) by mouth 2 (two) times daily. (Patient not taking: Reported on 12/31/2024)     Family History       Problem Relation (Age of Onset)    COPD Father    Diabetes Brother, Daughter, Son    Hyperlipidemia Daughter    Hypertension Mother, Brother,  Daughter, Son    Lupus Daughter    Lymphoma Daughter    No Known Problems Sister, Brother          Tobacco Use    Smoking status: Former     Current packs/day: 0.00     Types: Cigarettes     Quit date: 1994     Years since quittin.5     Passive exposure: Never    Smokeless tobacco: Never   Substance and Sexual Activity    Alcohol use: Not Currently    Drug use: Not Currently    Sexual activity: Not Currently     Review of Systems   Constitutional:  Negative for chills and fever.   Respiratory:  Negative for chest tightness and shortness of breath.    Cardiovascular:  Negative for chest pain and palpitations.   Gastrointestinal:  Positive for abdominal pain. Negative for blood in stool, constipation, diarrhea, nausea and vomiting.   Genitourinary:  Negative for dysuria.   Musculoskeletal:  Positive for arthralgias (Chronic right knee).   Neurological:  Positive for speech difficulty and weakness. Negative for dizziness, syncope and light-headedness.     Objective:     Vital Signs (Most Recent):  Temp: 98 °F (36.7 °C) (24 1841)  Pulse: 70 (24)  Resp: 18 (24)  BP: (!) 154/70 (24)  SpO2: 100 % (24) Vital Signs (24h Range):  Temp:  [98 °F (36.7 °C)] 98 °F (36.7 °C)  Pulse:  [63-72] 70  Resp:  [17-33] 18  SpO2:  [99 %-100 %] 100 %  BP: (154-173)/(70-79) 154/70     Weight: 70.8 kg (156 lb)  Body mass index is 22.38 kg/m².     Physical Exam  Constitutional:       General: He is not in acute distress.     Appearance: Normal appearance. He is normal weight. He is not ill-appearing, toxic-appearing or diaphoretic.   HENT:      Head: Normocephalic and atraumatic.      Right Ear: External ear normal.      Left Ear: External ear normal.      Nose: Nose normal.      Mouth/Throat:      Mouth: Mucous membranes are moist.      Pharynx: Oropharynx is clear.   Eyes:      Extraocular Movements: Extraocular movements intact.      Conjunctiva/sclera: Conjunctivae normal.       Pupils: Pupils are equal, round, and reactive to light.   Cardiovascular:      Rate and Rhythm: Normal rate and regular rhythm.      Pulses: Normal pulses.      Heart sounds: Normal heart sounds.   Pulmonary:      Effort: Pulmonary effort is normal. No respiratory distress.      Breath sounds: Normal breath sounds.   Abdominal:      General: Abdomen is flat. Bowel sounds are normal. There is no distension.      Palpations: Abdomen is soft.      Tenderness: There is abdominal tenderness (TTP around umbilicus).   Musculoskeletal:         General: No swelling or tenderness.      Cervical back: Normal range of motion.   Neurological:      Mental Status: He is alert.      Cranial Nerves: No cranial nerve deficit.      Sensory: No sensory deficit.      Motor: Weakness (3/5 strength in all limbs) present.      Coordination: Coordination abnormal.      Comments: Speech slurred  Can perform heel to shin and finger to nose (albeit, slowly), not able to perform rapid alternating hand test              CRANIAL NERVES     CN III, IV, VI   Pupils are equal, round, and reactive to light.       Significant Labs: All pertinent labs within the past 24 hours have been reviewed.    Significant Imaging: I have reviewed all pertinent imaging results/findings within the past 24 hours.  Assessment/Plan:     * Slurred speech  Patient with history of stroke with left sided deficits now with new right-sided weakness and slurred speech.  CT Head:   No convincing acute intracranial abnormalities or evidence of large territory acute infarct.   Suspected remote lacunar infarct in the right thalamus.  Correlation needed.   There is patchy deep white matter low density as seen with microvascular chronic ischemic changes .  This limits detection for nonhemorrhagic acute white matter lacunar type infarcts.  Additional imaging can further evaluate these findings as clinically warranted.   Pan sinus disease with air-fluid levels in the maxillary and  sphenoid sinuses.     Plan:  - Neuro checks q4hr.   - Labs pending: Hgb A1c, B1, B9, B12, FTA, HIV,   - F/u MRA Brain and neck w/o Contrast  - [facility] consulted; appreciate recs  - PT/OT/SLP consulted, appreciate recs  - Holding all oral home meds, consider restarting as appropriate  - F/u UA and Ucx    Abdominal pain  Patient reporting abdominal pain. Mild TTP over umbilicus. Patient VSS. Denies nausea and vomiting.    Plan:  - Can consider CTA abdomen and pelvis if worsening abdominal pain  - Will give rectal tylenol for abdominal and knee pain.         Osteoarthritis of both knees  Patient with history of OA of both knees complaining of pain in the right knee.   XR Right knee: chronic findings stable from prior    Plan:  - Holding all oral medications 2/2 failed bedside swallow exam; restart when appropriate  - Will give rectal tylenol for abdominal and knee pain    Hypertension  Patient's blood pressure range in the last 24 hours was: BP  Min: 154/70  Max: 173/79.The patient's inpatient anti-hypertensive regimen is listed below:  Current Antihypertensives  labetaloL injection 10 mg, Every 15 min PRN, Intravenous    Plan  - BP is controlled, no changes needed to their regimen  - Holding all oral home meds 2/2 failed bedside swallow test  - Permissive HTN protocol in place    Stage 3b chronic kidney disease  Creatine stable for now. BMP reviewed- noted Estimated Creatinine Clearance: 43.9 mL/min (based on SCr of 1.3 mg/dL). according to latest data. Based on current GFR, CKD stage is stage 3 - GFR 30-59.  Monitor UOP and serial BMP and adjust therapy as needed. Renally dose meds. Avoid nephrotoxic medications and procedures.    Plan:  - Continue to monitor      VTE Risk Mitigation (From admission, onward)           Ordered     enoxaparin injection 40 mg  Daily         12/31/24 2258     IP VTE HIGH RISK PATIENT  Once         12/31/24 2258     Place sequential compression device  Until discontinued          12/31/24 2258                       _________________________________________  Giuliana Regalado MD, PGY-1  [facility]

## 2025-01-01 NOTE — PLAN OF CARE
Problem: Physical Therapy  Goal: Physical Therapy Goal  Description: Goals to be met by: 2025     Patient will increase functional independence with mobility by performin. Supine to sit with Supervision/Mod I  2. Sit to supine with Supervision/Mod I  3. Sit to stand transfer with Supervision  4. Gait  x 50 feet with Supervision using Rolling Walker.     Outcome: Progressing     PT Eval received and completed. Patient's PLOF is mod I with transfers and gait with rollator however questionable accuracy. Patient able to communicate however difficulty understanding his speech which improved throughout evaluation however still limited. Patient currently requiring moderate assist with sit to/from supine, moderate assist with sit to/from stand transition, and minimal assist with gait x 5 feet forward/backward and side stepping with patient experiencing pain at right knee. Patient presents with right knee flexion contracture possibly contributing to chronic right knee pain. This patient would benefit from HIT skilled physical therapy while admitted in the hospital to improve bilateral LE MMT grossly, to improve functional independence with transfers and gait, and to improve balance and decrease fall risk.

## 2025-01-02 PROBLEM — I65.23 BILATERAL CAROTID ARTERY STENOSIS: Status: ACTIVE | Noted: 2025-01-02

## 2025-01-02 LAB
ALBUMIN SERPL BCP-MCNC: 3.3 G/DL (ref 3.5–5.2)
ALP SERPL-CCNC: 71 U/L (ref 40–150)
ALT SERPL W/O P-5'-P-CCNC: 14 U/L (ref 10–44)
ANION GAP SERPL CALC-SCNC: 12 MMOL/L (ref 8–16)
APICAL FOUR CHAMBER EJECTION FRACTION: 53 %
APICAL TWO CHAMBER EJECTION FRACTION: 60 %
ASCENDING AORTA: 3.34 CM
AST SERPL-CCNC: 18 U/L (ref 10–40)
AV INDEX (PROSTH): 0.87
AV MEAN GRADIENT: 3.9 MMHG
AV PEAK GRADIENT: 6.8 MMHG
AV REGURGITATION PRESSURE HALF TIME: 632.43 MS
AV VALVE AREA BY VELOCITY RATIO: 4.4 CM²
AV VALVE AREA: 5 CM²
AV VELOCITY RATIO: 0.77
BASOPHILS # BLD AUTO: 0.02 K/UL (ref 0–0.2)
BASOPHILS # BLD AUTO: 0.02 K/UL (ref 0–0.2)
BASOPHILS NFR BLD: 0.4 % (ref 0–1.9)
BASOPHILS NFR BLD: 0.4 % (ref 0–1.9)
BILIRUB SERPL-MCNC: 0.6 MG/DL (ref 0.1–1)
BSA FOR ECHO PROCEDURE: 1.87 M2
BUN SERPL-MCNC: 18 MG/DL (ref 8–23)
CALCIUM SERPL-MCNC: 8.9 MG/DL (ref 8.7–10.5)
CHLORIDE SERPL-SCNC: 107 MMOL/L (ref 95–110)
CO2 SERPL-SCNC: 18 MMOL/L (ref 23–29)
CREAT SERPL-MCNC: 1.2 MG/DL (ref 0.5–1.4)
CV ECHO LV RWT: 0.42 CM
DIFFERENTIAL METHOD BLD: ABNORMAL
DIFFERENTIAL METHOD BLD: ABNORMAL
DOP CALC AO PEAK VEL: 1.3 M/S
DOP CALC AO VTI: 28.7 CM
DOP CALC LVOT AREA: 5.7 CM2
DOP CALC LVOT DIAMETER: 2.7 CM
DOP CALC LVOT PEAK VEL: 1 M/S
DOP CALC LVOT STROKE VOLUME: 143.6 CM3
DOP CALC MV VTI: 27.3 CM
DOP CALCLVOT PEAK VEL VTI: 25.1 CM
E WAVE DECELERATION TIME: 305.36 MSEC
E/A RATIO: 0.64
E/E' RATIO: 13.43 M/S
ECHO LV POSTERIOR WALL: 0.9 CM (ref 0.6–1.1)
EOSINOPHIL # BLD AUTO: 0.2 K/UL (ref 0–0.5)
EOSINOPHIL # BLD AUTO: 0.2 K/UL (ref 0–0.5)
EOSINOPHIL NFR BLD: 3.4 % (ref 0–8)
EOSINOPHIL NFR BLD: 3.4 % (ref 0–8)
ERYTHROCYTE [DISTWIDTH] IN BLOOD BY AUTOMATED COUNT: 16.2 % (ref 11.5–14.5)
ERYTHROCYTE [DISTWIDTH] IN BLOOD BY AUTOMATED COUNT: 16.2 % (ref 11.5–14.5)
EST. GFR  (NO RACE VARIABLE): >60 ML/MIN/1.73 M^2
FRACTIONAL SHORTENING: 30.2 % (ref 28–44)
GLUCOSE SERPL-MCNC: 69 MG/DL (ref 70–110)
HCT VFR BLD AUTO: 30.8 % (ref 40–54)
HCT VFR BLD AUTO: 30.8 % (ref 40–54)
HGB BLD-MCNC: 9.9 G/DL (ref 14–18)
HGB BLD-MCNC: 9.9 G/DL (ref 14–18)
HR MV ECHO: 75 BPM
IMM GRANULOCYTES # BLD AUTO: 0.01 K/UL (ref 0–0.04)
IMM GRANULOCYTES # BLD AUTO: 0.01 K/UL (ref 0–0.04)
IMM GRANULOCYTES NFR BLD AUTO: 0.2 % (ref 0–0.5)
IMM GRANULOCYTES NFR BLD AUTO: 0.2 % (ref 0–0.5)
INTERVENTRICULAR SEPTUM: 1.5 CM (ref 0.6–1.1)
IVC DIAMETER: 1.75 CM
LEFT ATRIUM AREA SYSTOLIC (APICAL 2 CHAMBER): 15.2 CM2
LEFT ATRIUM AREA SYSTOLIC (APICAL 4 CHAMBER): 21.33 CM2
LEFT ATRIUM VOLUME INDEX MOD: 21.8 ML/M2
LEFT ATRIUM VOLUME MOD: 41.04 ML
LEFT INTERNAL DIMENSION IN SYSTOLE: 3 CM (ref 2.1–4)
LEFT VENTRICLE DIASTOLIC VOLUME INDEX: 43.58 ML/M2
LEFT VENTRICLE DIASTOLIC VOLUME: 81.93 ML
LEFT VENTRICLE END DIASTOLIC VOLUME APICAL 2 CHAMBER: 89.22 ML
LEFT VENTRICLE END DIASTOLIC VOLUME APICAL 4 CHAMBER: 76.09 ML
LEFT VENTRICLE END SYSTOLIC VOLUME APICAL 2 CHAMBER: 31.03 ML
LEFT VENTRICLE END SYSTOLIC VOLUME APICAL 4 CHAMBER: 54.6 ML
LEFT VENTRICLE MASS INDEX: 98.2 G/M2
LEFT VENTRICLE SYSTOLIC VOLUME INDEX: 19.2 ML/M2
LEFT VENTRICLE SYSTOLIC VOLUME: 36.12 ML
LEFT VENTRICULAR INTERNAL DIMENSION IN DIASTOLE: 4.3 CM (ref 3.5–6)
LEFT VENTRICULAR MASS: 184.7 G
LV LATERAL E/E' RATIO: 11.75 M/S
LV SEPTAL E/E' RATIO: 15.67 M/S
LVED V (TEICH): 81.93 ML
LVES V (TEICH): 36.12 ML
LVOT MG: 2.4 MMHG
LVOT MV: 0.75 CM/S
LYMPHOCYTES # BLD AUTO: 1.8 K/UL (ref 1–4.8)
LYMPHOCYTES # BLD AUTO: 1.8 K/UL (ref 1–4.8)
LYMPHOCYTES NFR BLD: 41 % (ref 18–48)
LYMPHOCYTES NFR BLD: 41 % (ref 18–48)
MAGNESIUM SERPL-MCNC: 2.2 MG/DL (ref 1.6–2.6)
MCH RBC QN AUTO: 28.1 PG (ref 27–31)
MCH RBC QN AUTO: 28.1 PG (ref 27–31)
MCHC RBC AUTO-ENTMCNC: 32.1 G/DL (ref 32–36)
MCHC RBC AUTO-ENTMCNC: 32.1 G/DL (ref 32–36)
MCV RBC AUTO: 88 FL (ref 82–98)
MCV RBC AUTO: 88 FL (ref 82–98)
MONOCYTES # BLD AUTO: 0.5 K/UL (ref 0.3–1)
MONOCYTES # BLD AUTO: 0.5 K/UL (ref 0.3–1)
MONOCYTES NFR BLD: 11 % (ref 4–15)
MONOCYTES NFR BLD: 11 % (ref 4–15)
MV A" WAVE DURATION": 163.65 MSEC
MV MEAN GRADIENT: 1 MMHG
MV PEAK A VEL: 0.74 M/S
MV PEAK E VEL: 0.47 M/S
MV PEAK GRADIENT: 3 MMHG
MV STENOSIS PRESSURE HALF TIME: 88.55 MS
MV VALVE AREA BY CONTINUITY EQUATION: 5.26 CM2
MV VALVE AREA P 1/2 METHOD: 2.48 CM2
NEUTROPHILS # BLD AUTO: 2 K/UL (ref 1.8–7.7)
NEUTROPHILS # BLD AUTO: 2 K/UL (ref 1.8–7.7)
NEUTROPHILS NFR BLD: 44 % (ref 38–73)
NEUTROPHILS NFR BLD: 44 % (ref 38–73)
NRBC BLD-RTO: 0 /100 WBC
NRBC BLD-RTO: 0 /100 WBC
OHS CV RV/LV RATIO: 1.02 CM
OHS LV EJECTION FRACTION SIMPSONS BIPLANE MOD: 58 %
PHOSPHATE SERPL-MCNC: 4.2 MG/DL (ref 2.7–4.5)
PISA AR MAX VEL: 4.61 M/S
PISA TR MAX VEL: 2.03 M/S
PLATELET # BLD AUTO: 131 K/UL (ref 150–450)
PLATELET # BLD AUTO: 131 K/UL (ref 150–450)
PMV BLD AUTO: 12.8 FL (ref 9.2–12.9)
PMV BLD AUTO: 12.8 FL (ref 9.2–12.9)
POCT GLUCOSE: 114 MG/DL (ref 70–110)
POCT GLUCOSE: 75 MG/DL (ref 70–110)
POCT GLUCOSE: 77 MG/DL (ref 70–110)
POCT GLUCOSE: 78 MG/DL (ref 70–110)
POCT GLUCOSE: 91 MG/DL (ref 70–110)
POTASSIUM SERPL-SCNC: 4.4 MMOL/L (ref 3.5–5.1)
PROT SERPL-MCNC: 6.9 G/DL (ref 6–8.4)
PULM VEIN S/D RATIO: 1.5
PV MV: 0.76 M/S
PV PEAK D VEL: 0.34 M/S
PV PEAK GRADIENT: 4 MMHG
PV PEAK S VEL: 0.51 M/S
PV PEAK VELOCITY: 0.98 M/S
RA PRESSURE ESTIMATED: 8 MMHG
RA VOL SYS: 57.1 ML
RBC # BLD AUTO: 3.52 M/UL (ref 4.6–6.2)
RBC # BLD AUTO: 3.52 M/UL (ref 4.6–6.2)
RIGHT ATRIAL AREA: 17.9 CM2
RIGHT ATRIUM VOLUME AREA LENGTH APICAL 4 CHAMBER: 55.35 ML
RIGHT VENTRICLE DIASTOLIC BASEL DIMENSION: 4.4 CM
RV TB RVSP: 10 MMHG
RV TISSUE DOPPLER FREE WALL SYSTOLIC VELOCITY 1 (APICAL 4 CHAMBER VIEW): 11.78 CM/S
SINUS: 4.02 CM
SODIUM SERPL-SCNC: 137 MMOL/L (ref 136–145)
STJ: 3.27 CM
TDI LATERAL: 0.04 M/S
TDI SEPTAL: 0.03 M/S
TDI: 0.04 M/S
TR MAX PG: 16 MMHG
TRICUSPID ANNULAR PLANE SYSTOLIC EXCURSION: 1.8 CM
TV REST PULMONARY ARTERY PRESSURE: 24 MMHG
WBC # BLD AUTO: 4.46 K/UL (ref 3.9–12.7)
WBC # BLD AUTO: 4.46 K/UL (ref 3.9–12.7)
Z-SCORE OF LEFT VENTRICULAR DIMENSION IN END DIASTOLE: -2
Z-SCORE OF LEFT VENTRICULAR DIMENSION IN END SYSTOLE: -0.6

## 2025-01-02 PROCEDURE — 99223 1ST HOSP IP/OBS HIGH 75: CPT | Mod: FS,,, | Performed by: INTERNAL MEDICINE

## 2025-01-02 PROCEDURE — 84100 ASSAY OF PHOSPHORUS: CPT

## 2025-01-02 PROCEDURE — 97535 SELF CARE MNGMENT TRAINING: CPT | Mod: CO

## 2025-01-02 PROCEDURE — 83735 ASSAY OF MAGNESIUM: CPT

## 2025-01-02 PROCEDURE — 25000003 PHARM REV CODE 250

## 2025-01-02 PROCEDURE — 36415 COLL VENOUS BLD VENIPUNCTURE: CPT

## 2025-01-02 PROCEDURE — 27000221 HC OXYGEN, UP TO 24 HOURS

## 2025-01-02 PROCEDURE — 11000001 HC ACUTE MED/SURG PRIVATE ROOM

## 2025-01-02 PROCEDURE — 85025 COMPLETE CBC W/AUTO DIFF WBC: CPT

## 2025-01-02 PROCEDURE — 97530 THERAPEUTIC ACTIVITIES: CPT | Mod: CO

## 2025-01-02 PROCEDURE — 63600175 PHARM REV CODE 636 W HCPCS

## 2025-01-02 PROCEDURE — C1751 CATH, INF, PER/CENT/MIDLINE: HCPCS

## 2025-01-02 PROCEDURE — 36410 VNPNXR 3YR/> PHY/QHP DX/THER: CPT

## 2025-01-02 PROCEDURE — 80053 COMPREHEN METABOLIC PANEL: CPT

## 2025-01-02 PROCEDURE — 94761 N-INVAS EAR/PLS OXIMETRY MLT: CPT

## 2025-01-02 PROCEDURE — 99900035 HC TECH TIME PER 15 MIN (STAT)

## 2025-01-02 RX ORDER — ATORVASTATIN CALCIUM 40 MG/1
40 TABLET, FILM COATED ORAL DAILY
Status: DISCONTINUED | OUTPATIENT
Start: 2025-01-02 | End: 2025-01-04 | Stop reason: HOSPADM

## 2025-01-02 RX ORDER — LANOLIN ALCOHOL/MO/W.PET/CERES
1000 CREAM (GRAM) TOPICAL DAILY
Status: DISCONTINUED | OUTPATIENT
Start: 2025-01-02 | End: 2025-01-04 | Stop reason: HOSPADM

## 2025-01-02 RX ORDER — SODIUM CHLORIDE 0.9 % (FLUSH) 0.9 %
10 SYRINGE (ML) INJECTION EVERY 12 HOURS PRN
Status: DISCONTINUED | OUTPATIENT
Start: 2025-01-02 | End: 2025-01-04 | Stop reason: HOSPADM

## 2025-01-02 RX ORDER — AMLODIPINE BESYLATE 5 MG/1
5 TABLET ORAL DAILY
Status: DISCONTINUED | OUTPATIENT
Start: 2025-01-02 | End: 2025-01-04 | Stop reason: HOSPADM

## 2025-01-02 RX ADMIN — CLOPIDOGREL BISULFATE 75 MG: 75 TABLET ORAL at 08:01

## 2025-01-02 RX ADMIN — ENOXAPARIN SODIUM 40 MG: 40 INJECTION SUBCUTANEOUS at 03:01

## 2025-01-02 RX ADMIN — AMLODIPINE BESYLATE 5 MG: 5 TABLET ORAL at 11:01

## 2025-01-02 RX ADMIN — CYANOCOBALAMIN TAB 1000 MCG 1000 MCG: 1000 TAB at 11:01

## 2025-01-02 RX ADMIN — ASPIRIN 81 MG CHEWABLE TABLET 81 MG: 81 TABLET CHEWABLE at 08:01

## 2025-01-02 RX ADMIN — ATORVASTATIN CALCIUM 40 MG: 40 TABLET, FILM COATED ORAL at 11:01

## 2025-01-02 NOTE — PT/OT/SLP PROGRESS
"Occupational Therapy   Treatment    Name: Stanley Granados  MRN: [id number]  Admitting Diagnosis:  Slurred speech       Recommendations:     Discharge Recommendations: High Intensity Therapy  Discharge Equipment Recommendations:  to be determined by next level of care  Barriers to discharge:  Decreased caregiver support, Other (Comment) (Increased level of assistance)    Assessment:     Stanley Granados is a 84 y.o. male with a medical diagnosis of Slurred speech.   Performance deficits affecting function are weakness, impaired endurance, impaired self care skills, impaired functional mobility, gait instability, impaired balance, decreased upper extremity function, decreased lower extremity function, decreased safety awareness, abnormal tone, decreased ROM, impaired coordination, impaired fine motor, impaired skin, impaired joint extensibility.    Pt found in bed, agreeable to therapy.  Pt is progressing well towards goals. Continue OT services to address functional goals, progressing as able.      Rehab Prognosis:  Good; patient would benefit from acute skilled OT services to address these deficits and reach maximum level of function.       Plan:     Patient to be seen 5 x/week to address the above listed problems via self-care/home management, therapeutic activities, therapeutic exercises, neuromuscular re-education  Plan of Care Expires: 01/31/25  Plan of Care Reviewed with: patient    Subjective     Chief Complaint: "I need to walk more."   Patient/Family Comments/goals: to return to Prior level of functioning   Pain/Comfort:  Pain Rating 1: 0/10  Pain Rating Post-Intervention 1: 0/10    Objective:     Communicated with: nurse prior to session.  Patient found HOB elevated with bed alarm, peripheral IV, telemetry, Condom Catheter upon OT entry to room.    General Precautions: Standard, fall    Orthopedic Precautions:N/A  Braces: N/A  Respiratory Status: Room air     Occupational Performance:     Bed Mobility:    Patient " completed Rolling/Turning to Left with  minimum assistance and with side rail  Patient completed Scooting/Bridging with contact guard assistance to scoot seated to EOB  Patient completed Supine to Sit with minimum assistance and with side rail, HOB elevated, use of bed rail, increased time and effort, vc's for effective technique  Patient completed Sit to Supine with minimum assistance for BLE assist     Functional Mobility/Transfers:  Patient completed Sit <> Stand Transfer with minimum assistance  with  rolling walker   Patient completed Bed <> Chair Transfer using Stand Pivot technique with minimum assistance with rolling walker  Functional Mobility: Pt ambulated room distances (around bed x 2 trials) with Min A using RW.  Pt requires assist for RW safety/mgmt/proximity.     Activities of Daily Living:  Upper Body Dressing: maximal assistance don gown behind back   Lower Body Dressing: moderate assistance to don B socks.  Pt able to place LLE into figure 4 position. Pt with poor FMC/strength-difficulty manipulating socks.       [facility] 6 Click ADL: 15    Treatment & Education:  Pt O x person and hospital when given choices.   Pt with dysarthric speech however intelligible.   Pt sat EOB with SBA while attempting to don socks.  Unable to leave in chair 2/2 transport arrived for US.     Patient left HOB elevated with all lines intact, call button in reach, bed alarm on, and nurse notified    GOALS:   Multidisciplinary Problems       Occupational Therapy Goals          Problem: Occupational Therapy    Goal Priority Disciplines Outcome Interventions   Occupational Therapy Goal     OT, PT/OT Progressing    Description: Goals to be met by: 01/31/25     Patient will increase functional independence with ADLs by performing:    UE Dressing with Set-up Assistance.  LE Dressing with Set-up Assistance.  Grooming while standing at sink with Contact Guard Assistance.  Toileting from bedside commode with Minimal Assistance for  hygiene and clothing management.   Toilet transfer to bedside commode with Contact Guard Assistance.  Upper extremity exercise program 3x10 reps per handout, with assistance as needed.                         Time Tracking:     OT Date of Treatment: 01/02/25  OT Start Time: 1045  OT Stop Time: 1107  OT Total Time (min): 22 min    Billable Minutes:Self Care/Home Management 10  Therapeutic Activity 12               1/2/2025

## 2025-01-02 NOTE — HPI
Stanley Granados is an 85 y/o male with CVA- residual left-sided weakness, HTN, CKD 3b, and arthritis. Patient presented to the ED with slurred speech and right-sided weakness x 4 days. Patient is a poor historian. HPI retrieved mostly from chart. He reportedly has a shuffling gait and denies any falls. He denies CP, SOB, syncope, palpitations. 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 Patient is on DAPT, statin.

## 2025-01-02 NOTE — CONSULTS
[facility] - Telemetry  Cardiology  Consult Note    Patient Name: Stanley Granados  MRN: [id number]  Admission Date: 12/31/2024  Hospital Length of Stay: 2 days  Code Status: Full Code   Attending Provider: No att. providers found   Consulting Provider: Silverio Cole NP  Primary Care Physician: Phuong Umaña MD  Principal Problem:Slurred speech    Patient information was obtained from past medical records and ER records.     Inpatient consult to [facility]  Consult performed by: Silverio Cole NP  Consult ordered by: Kali Cummings MD        Subjective:     Chief Complaint:  Slurred speech, weakness     HPI:   Stanley Granados is an 83 y/o male with CVA- residual left-sided weakness, HTN, CKD 3b, and arthritis. Patient presented to the ED with slurred speech and right-sided weakness x 4 days. Patient is a poor historian. HPI retrieved mostly from chart. He reportedly has a shuffling gait and denies any falls. He denies CP, SOB, syncope, palpitations. 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 Patient is on DAPT, statin.     Past Medical History:   Diagnosis Date    Arthritis     Hypertension     Renal disorder     Stroke        History reviewed. No pertinent surgical history.    Review of patient's allergies indicates:   Allergen Reactions    Nsaids (non-steroidal anti-inflammatory drug)        No current facility-administered medications on file prior to encounter.     Current Outpatient Medications on File Prior to Encounter   Medication Sig    amLODIPine (NORVASC) 5 MG tablet Take 1 tablet (5 mg total) by mouth once daily.    carvediloL (COREG) 25 MG tablet Take 1 tablet (25 mg total) by mouth 2 (two) times daily.    ferrous gluconate (FERGON) 324 MG tablet Take 1 tablet (324 mg total) by mouth every morning.    magnesium oxide (MAG-OX) 400 mg (241.3 mg magnesium) tablet Take 1 tablet (400 mg total) by mouth once daily.    nitroGLYCERIN (NITROSTAT) 0.4 MG SL tablet Place 1 tablet (0.4 mg total) under the  tongue every 5 (five) minutes as needed for Chest pain.    atorvastatin (LIPITOR) 40 MG tablet Take 1 tablet (40 mg total) by mouth once daily. (Patient not taking: Reported on 2024)    fexofenadine (ALLEGRA) 180 MG tablet Take 1 tablet (180 mg total) by mouth once daily. (Patient not taking: Reported on 2024)    potassium chloride (KLOR-CON) 8 MEQ TbSR Take 8 mEq by mouth once daily. (Patient not taking: Reported on 2024)    ranolazine (RANEXA) 1,000 mg Tb12 Take 1 tablet (1,000 mg total) by mouth 2 (two) times daily. (Patient not taking: Reported on 2024)    sodium bicarbonate 650 MG tablet Take 1 tablet (650 mg total) by mouth 2 (two) times daily. (Patient not taking: Reported on 2024)     Family History       Problem Relation (Age of Onset)    COPD Father    Diabetes Brother, Daughter, Son    Hyperlipidemia Daughter    Hypertension Mother, Brother, Daughter, Son    Lupus Daughter    Lymphoma Daughter    No Known Problems Sister, Brother          Tobacco Use    Smoking status: Former     Current packs/day: 0.00     Types: Cigarettes     Quit date: 1994     Years since quittin.6     Passive exposure: Never    Smokeless tobacco: Never   Substance and Sexual Activity    Alcohol use: Not Currently    Drug use: Not Currently    Sexual activity: Not Currently     Review of Systems   Constitutional: Negative. Negative for diaphoresis.   HENT: Negative.     Eyes: Negative.    Cardiovascular:  Negative for chest pain, irregular heartbeat, leg swelling, near-syncope, orthopnea, palpitations, paroxysmal nocturnal dyspnea and syncope.   Respiratory: Negative.  Negative for cough.    Endocrine: Negative.    Hematologic/Lymphatic: Negative.    Gastrointestinal:  Negative for nausea and vomiting.   Genitourinary: Negative.    Neurological:  Positive for focal weakness. Negative for dizziness and light-headedness.   Psychiatric/Behavioral: Negative.       Objective:     Vital Signs (Most  Recent):  Temp: 97.5 °F (36.4 °C) (01/02/25 0718)  Pulse: 70 (01/02/25 0718)  Resp: 18 (01/02/25 0718)  BP: (!) 180/75 (01/02/25 0718)  SpO2: 99 % (01/02/25 0718) Vital Signs (24h Range):  Temp:  [97.1 °F (36.2 °C)-98.8 °F (37.1 °C)] 97.5 °F (36.4 °C)  Pulse:  [52-75] 70  Resp:  [17-25] 18  SpO2:  [94 %-100 %] 99 %  BP: (151-186)/(70-84) 180/75     Weight: 70.8 kg (156 lb 1.4 oz)  Body mass index is 22.4 kg/m².    SpO2: 99 %         Intake/Output Summary (Last 24 hours) at 1/2/2025 1031  Last data filed at 1/2/2025 0520  Gross per 24 hour   Intake --   Output 1100 ml   Net -1100 ml       Lines/Drains/Airways       Drain  Duration             Male External Urinary Catheter 01/01/25 0912 1 day              Peripheral Intravenous Line  Duration                  Peripheral IV - Single Lumen 12/31/24 1923 20 G Yes Anterior;Distal;Right Upper Arm 1 day                     Physical Exam  Constitutional:       General: He is not in acute distress.     Appearance: He is not diaphoretic.   HENT:      Head: Atraumatic.   Eyes:      General:         Right eye: No discharge.         Left eye: No discharge.   Cardiovascular:      Rate and Rhythm: Normal rate and regular rhythm.   Pulmonary:      Effort: Pulmonary effort is normal.      Breath sounds: Normal breath sounds.   Abdominal:      General: Bowel sounds are normal.      Palpations: Abdomen is soft.   Musculoskeletal:      Right lower leg: No edema.      Left lower leg: No edema.   Skin:     General: Skin is warm and dry.   Neurological:      Mental Status: He is alert. Mental status is at baseline.          Significant Labs: BMP:   Recent Labs   Lab 12/31/24  1930 01/01/25  1319 01/02/25  0212   GLU 95 86 69*    139 137   K 4.3 4.2 4.4    108 107   CO2 20* 21* 18*   BUN 22 16 18   CREATININE 1.3 1.1 1.2   CALCIUM 9.1 8.8 8.9   MG  --  2.1 2.2   , CMP   Recent Labs   Lab 12/31/24  1930 01/01/25  1319 01/02/25  0212    139 137   K 4.3 4.2 4.4    108  "107   CO2 20* 21* 18*   GLU 95 86 69*   BUN 22 16 18   CREATININE 1.3 1.1 1.2   CALCIUM 9.1 8.8 8.9   PROT 7.6 6.7 6.9   ALBUMIN 3.6 3.3* 3.3*   BILITOT 0.4 0.8 0.6   ALKPHOS 73 69 71   AST 17 14 18   ALT 14 12 14   ANIONGAP 12 10 12   , CBC   Recent Labs   Lab 12/31/24 1930 01/01/25  1319 01/02/25  0212   WBC 5.77 4.39  4.39 4.46  4.46   HGB 10.2* 9.3*  9.3* 9.9*  9.9*   HCT 30.2* 27.9*  27.9* 30.8*  30.8*    142*  142* 131*  131*   , INR   Recent Labs   Lab 12/31/24 1930 01/01/25  1319   INR 1.0 1.1   , Lipid Panel   Recent Labs   Lab 12/31/24 1930   CHOL 142   HDL 45   LDLCALC 84.2   TRIG 64   CHOLHDL 31.7   , Troponin No results for input(s): "TROPONINIHS" in the last 48 hours., and All pertinent lab results from the last 24 hours have been reviewed.    Significant Imaging: Echocardiogram: Transthoracic echo (TTE) complete (Cupid Only):   Results for orders placed or performed during the hospital encounter of 11/17/24   Echo   Result Value Ref Range    BSA 1.85 m2    Quach's Biplane MOD Ejection Fraction 64 %    A2C EF 65 %    A4C EF 71 %    LVOT stroke volume 207.3 cm3    LVIDd 4.9 3.5 - 6.0 cm    LV Systolic Volume 44.06 mL    LV Systolic Volume Index 23.7 mL/m2    LVIDs 3.3 2.1 - 4.0 cm    LV ESV A2C 126.41 mL    LV Diastolic Volume 113.17 mL    LV ESV A4C 124.00 mL    LV Diastolic Volume Index 60.84 mL/m2    LV EDV A2C 69.630023033306042 mL    LV EDV A4C 60.98 mL    Left Ventricular End Systolic Volume by Teichholz Method 44.06 mL    Left Ventricular End Diastolic Volume by Teichholz Method 113.17 mL    IVS 1.7 (A) 0.6 - 1.1 cm    LVOT diameter 2.9 cm    LVOT area 6.6 cm2    FS 32.7 28 - 44 %    Left Ventricle Relative Wall Thickness 0.73 cm    PW 1.8 (A) 0.6 - 1.1 cm    LV mass 395.8 g    LV Mass Index 212.8 g/m2    MV Peak E Bill 1.01 m/s    TDI LATERAL 0.06 m/s    TDI SEPTAL 0.06 m/s    E/E' ratio 16.83 m/s    MV Peak A Bill 0.76 m/s    TR Max Bill 2.79 m/s    E/A ratio 1.33     E wave " deceleration time 254.79 msec    LV SEPTAL E/E' RATIO 16.83 m/s    LV LATERAL E/E' RATIO 16.83 m/s    PV Peak S Bill 0.68 m/s    PV Peak D Bill 0.42 m/s    Pulm vein S/D ratio 1.62     LVOT peak bill 1.3 m/s    Left Ventricular Outflow Tract Mean Velocity 1.03 cm/s    Left Ventricular Outflow Tract Mean Gradient 4.55 mmHg    RV- torres basal diam 3.9 cm    RV S' 12.41 cm/s    TAPSE 2.19 cm    RV/LV Ratio 0.80 cm    LA Vol (MOD) 131.30 mL    SLOANE (MOD) 70.6 mL/m2    RA area length vol 65.33 mL    RA Area 22.0 cm2    RA Vol 68.34 mL    AV regurgitation pressure 1/2 time [id number] ms    AR Max Bill 4.11 m/s    AV mean gradient 9.4 mmHg    AV peak gradient 13.0 mmHg    Ao peak bill 1.8 m/s    Ao VTI 41.9 cm    LVOT peak VTI 31.4 cm    AV valve area 4.9 cm²    AV Velocity Ratio 0.72     AV index (prosthetic) 0.75     GIANA by Velocity Ratio 4.8 cm²    Mr max bill 4.93 m/s    MV mean gradient 2 mmHg    MV peak gradient 4 mmHg    MV valve area by continuity eq 6.56 cm2    MV VTI 31.6 cm    Triscuspid Valve Regurgitation Peak Gradient 31 mmHg    PV PEAK VELOCITY 0.91 m/s    PV peak gradient 3 mmHg    Pulmonary Valve Mean Velocity 0.66 m/s    Sinus 3.64 cm    STJ 2.52 cm    Ascending aorta 3.21 cm    IVC diameter 1.29 cm    Mean e' 0.06 m/s    ZLVIDS 0.26     ZLVIDD -0.55     LA area A4C 36.09 cm2    LA area A2C 33.69 cm2    TV resting pulmonary artery pressure 34 mmHg    RV TB RVSP 6 mmHg    Est. RA pres 3 mmHg    Narrative      Left Ventricle: The left ventricle is normal in size. Moderate septal   thickening. There is concentric hypertrophy. There is normal systolic   function. Biplane (2D) method of discs ejection fraction is 64%. Grade II   diastolic dysfunction. Elevated left ventricular filling pressure.    Right Ventricle: Normal right ventricular cavity size. Wall thickness   is normal. Systolic function is normal.    Left Atrium: Left atrium is severely dilated.    Aortic Valve: The aortic valve is a trileaflet  valve. There is moderate   aortic valve sclerosis. Mildly restricted motion. There is moderate aortic   regurgitation with an anteromedial eccentrically directed jet.    Pulmonary Artery: The estimated pulmonary artery systolic pressure is   34 mmHg.    IVC/SVC: Normal venous pressure at 3 mmHg.       Assessment and Plan:     Bilateral carotid artery stenosis  CT with KIMBERLEY 50% LICA 60%  US pending  Continue medical management with DAPT statin  Further recs to follow- will consider referral as OP if significant stenosis     Hypertension  Patient's blood pressure range in the last 24 hours was: BP  Min: 151/70  Max: 186/79.The patient's inpatient anti-hypertensive regimen is listed below:  Current Antihypertensives  labetaloL injection 10 mg, Every 15 min PRN, Intravenous  amLODIPine tablet 5 mg, Daily, Oral    Resume home antihypertensives once cleared by neuro    Stage 3b chronic kidney disease  Stable  Avoid nephrotoxic agents         VTE Risk Mitigation (From admission, onward)           Ordered     enoxaparin injection 40 mg  Daily         12/31/24 2258     IP VTE HIGH RISK PATIENT  Once         12/31/24 2258     Place sequential compression device  Until discontinued         12/31/24 2258                    Thank you for your consult. I will follow-up with patient. Please contact us if you have any additional questions.    Silverio Cole NP  Cardiology   [facility] - Telemetry

## 2025-01-02 NOTE — CONSULTS
NEUROLOGY CONSULT EVALUATION    Reason for consult:  Slurred speech, RSW    HPI: Patient is an 83 yo male with PMH of dementia, CVA, HTN, and CKD who presented to the ED for slurred speech and R sided facial droop. Last known normal was 4 days prior to presentation per his daughter. CTH with remote lacunar infarcts and microvascular chronic ischemic changes. CTA with atherosclerotic disease and bilateral proximal ICA stenosis. Patient is only oriented to self and location on interview, this is baseline per his daughter. However, she says he is mostly independent of ADLs. Able to dress and bathe himself, does his own laundry. He ambulates independently around his house.     Histories:     Allergies:  Nsaids (non-steroidal anti-inflammatory drug)    Current Medications:    Current Outpatient Medications   Medication Instructions    amLODIPine (NORVASC) 5 mg, Oral, Daily    atorvastatin (LIPITOR) 40 mg, Oral, Daily    carvediloL (COREG) 25 mg, Oral, 2 times daily    ferrous gluconate (FERGON) 324 mg, Oral, Every morning    fexofenadine (ALLEGRA) 180 mg, Oral, Daily    magnesium oxide (MAG-OX) 400 mg, Oral, Daily    nitroGLYCERIN (NITROSTAT) 0.4 mg, Sublingual, Every 5 min PRN    potassium chloride (KLOR-CON) 8 MEQ TbSR 8 mEq, Daily    ranolazine (RANEXA) 1,000 mg, Oral, 2 times daily    sodium bicarbonate 650 mg, Oral, 2 times daily        Past Medical/Surgical/Family History:  PMHx:   Past Medical History:   Diagnosis Date    Arthritis     Hypertension     Renal disorder     Stroke       Surgeries: History reviewed. No pertinent surgical history.   Family  Hx:   Family History   Problem Relation Name Age of Onset    Hypertension Mother      COPD Father      No Known Problems Sister Zunilda     No Known Problems Brother Rowdy Hough     Hypertension Brother Jaxon     Diabetes Brother Jaxon     Hypertension Daughter x3     Hyperlipidemia Daughter x3     Diabetes Daughter x3     Lupus Daughter x3     Lymphoma Daughter x1      Hypertension Son x2     Diabetes Son x2        Current Evaluation:     Vital Signs:   Vitals:    01/02/25 0718   BP: (!) 180/75   Pulse: 70   Resp: 18   Temp: 97.5 °F (36.4 °C)        Neurological Examination   Gen: Oriented to person and place, not year   Language: No dysarthria or aphasia    CN  CN II - PERRLA  CN III, IV, VI - EOM intact without nystagmus  CN V1, V2, V3 - Facial sensation preserved bilaterally  CN VII - Patient is able to smile and raise eyebrows symmetrically  CN VIII - No hearing deficit  CN XI - Motor strength of shoulder shrug is 5/5 bilaterally  CN XII - Tongue protrudes midline without fasciculation    Motor  Diffuse muscle wasting   LUE 5/5   RUE 4/5 hand , biceps, and triceps  LLE 4/5   RLE 4/5  Mild diffuse weakness of bilateral lower extremities     Sensory: Intact to light touch in BUE and BLE, no neglect    Cerebellar: No dysmetria on finger to nose bilaterally    Gait: Deferred    LABORATORY STUDIES:  CBC:   Recent Labs   Lab 12/31/24 1930 01/01/25 1319 01/02/25 0212   WBC 5.77 4.39  4.39 4.46  4.46   HGB 10.2* 9.3*  9.3* 9.9*  9.9*   HCT 30.2* 27.9*  27.9* 30.8*  30.8*    142*  142* 131*  131*   MCV 85 86  86 88  88   RDW 16.0* 16.2*  16.2* 16.2*  16.2*     BMP:   Recent Labs   Lab 12/31/24 1930 01/01/25 1319 01/02/25 0212    139 137   K 4.3 4.2 4.4    108 107   CO2 20* 21* 18*   BUN 22 16 18   CREATININE 1.3 1.1 1.2   GLU 95 86 69*   CALCIUM 9.1 8.8 8.9   MG  --  2.1 2.2   PHOS  --  4.1 4.2     LFTs:   Recent Labs   Lab 12/31/24 1930 01/01/25 1319 01/02/25 0212   PROT 7.6 6.7 6.9   ALBUMIN 3.6 3.3* 3.3*   BILITOT 0.4 0.8 0.6   AST 17 14 18   ALKPHOS 73 69 71   ALT 14 12 14     Coags:   Recent Labs   Lab 12/31/24 1930 01/01/25  1319   INR 1.0 1.1     FLP:   Recent Labs   Lab 12/31/24 1930   CHOL 142   LDLCALC 84.2   TRIG 64   CHOLHDL 31.7     DM:   Recent Labs   Lab 12/31/24 1930 01/01/25  1319 01/02/25  0212   HGBA1C  --  5.5  --     LDLCALC 84.2  --   --    CREATININE 1.3 1.1 1.2     Thyroid:   Recent Labs   Lab 12/31/24 1930   TSH 3.304     Anemia:   Recent Labs   Lab 01/01/25  1319   [id number] 164*   FOLATE 10.5     Cardiac:   Recent Labs   Lab 12/31/24 1930   TROPONINI <0.006     Urinalysis:   Recent Labs   Lab 01/01/25  0141   COLORU Yellow   APPEARANCEUA Clear   PHUR 7.0   SPECGRAV 1.010   PROTEINUA Trace*   GLUCUA Negative   KETONESU Negative   BILIRUBINUA Negative   OCCULTUA 2+*   NITRITE Negative   UROBILINOGEN Negative   LEUKOCYTESUR Negative     Urine Micro:  Recent Labs   Lab 01/01/25  0141   RBCUA 95*   WBCUA 2   MICROCMT SEE COMMENT         RADIOLOGY STUDIES:  I have personally reviewed the images performed.     CTA head: Atherosclerotic plaquing cavernous and supraclinoid segments of the ICAs with probable moderate narrowing.  In addition there is atherosclerotic disease in the distal V3 and proximal V4 segments of the vertebral arteries bilaterally with concern for additional moderate stenosis  No definite proximal high-grade stenosis or proximal large vessel occlusion.     CTA neck: Significant motion distorted examination.  Prominent atherosclerotic plaquing carotid bifurcations and proximal ICAs with concern for 50% right and approximately 60% left proximal ICA stenosis by NASCET criteria allowing for limitation.  This may be better assessed carotid ultrasonography.     CT head: No evidence for acute intracranial hemorrhage or definite abnormal parenchymal enhancement.  Stable hypodensities within the thalami suggestive for remote lacunar-type infarcts        Assessment:     Patient is an 85 yo male with PMH of CVA with residual LSW, HTN, and CKD who presented to the ED for slurred speech and R sided weakness. CTH with chronic lacunar infarcts. CTA with prominent atherosclerosis at the carotid bifurcation and 50% stenosis of proximal R ICA and 60% stenosis of proximal L ICA. On exam, he has mild RUE weakness. Bilateral  lower extremities are diffusely weak.     Impression:  Acute infarct vs TIA (symptoms have since resolved per his daughter)     Recommendations  - MRI Brain without contrast   - Carotid ultrasound with elevated L ICA velocities, consider vascular consult   - Recent echo (11/2024) with diastolic dysfunction and L atrial dilation. Recommend repeat echo with bubble study  - DAPT for 21 days followed by aspirin monotherapy thereafter   - LDL 84, continue atorvastatin for LDL goal <70  - TSH, HIV, RPR, and A1C wnl   - B12 goal >400, continue B12 supplementation      Elen Nair MD   [facility] PGY-3

## 2025-01-02 NOTE — PLAN OF CARE
Problem: Occupational Therapy  Goal: Occupational Therapy Goal  Description: Goals to be met by: 01/31/25     Patient will increase functional independence with ADLs by performing:    UE Dressing with Set-up Assistance.  LE Dressing with Set-up Assistance.  Grooming while standing at sink with Contact Guard Assistance.  Toileting from bedside commode with Minimal Assistance for hygiene and clothing management.   Toilet transfer to bedside commode with Contact Guard Assistance.  Upper extremity exercise program 3x10 reps per handout, with assistance as needed.    Outcome: Pili Granados is a 84 y.o. male with a medical diagnosis of Slurred speech.   Performance deficits affecting function are weakness, impaired endurance, impaired self care skills, impaired functional mobility, gait instability, impaired balance, decreased upper extremity function, decreased lower extremity function, decreased safety awareness, abnormal tone, decreased ROM, impaired coordination, impaired fine motor, impaired skin, impaired joint extensibility.    Pt found in bed, agreeable to therapy.  Pt is progressing well towards goals. Continue OT services to address functional goals, progressing as able.

## 2025-01-02 NOTE — ASSESSMENT & PLAN NOTE
Patient's blood pressure range in the last 24 hours was: BP  Min: 151/70  Max: 186/79.The patient's inpatient anti-hypertensive regimen is listed below:  Current Antihypertensives  labetaloL injection 10 mg, Every 15 min PRN, Intravenous    Plan  - BP is controlled, no changes needed to their regimen  - Permissive HTN protocol in place  - Will restart home medications as appropriate

## 2025-01-02 NOTE — PLAN OF CARE
CM met with pt - he is AAO x 3 but has difficulty hearing - CM called and spoke with daughter Ashley Lane  [phone] - pt lives with Ms. Ramirez and her .      Pt has a Rollator, RW and SC   Current with [facility]       Dx;  AMS      Per therapy recc - high intensity - Sofia is evaluaing pt ([facility])   Daughter is open to IPR - she doesn't want plcmt in a NH setting.    Pt is a retired .         01/02/25 1728   Discharge Assessment   Assessment Type Discharge Planning Assessment   Confirmed/corrected address, phone number and insurance Yes   Confirmed Demographics Correct on Facesheet   Source of Information family;health record   Communicated VERONICA with patient/caregiver No   People in Home child(chanelle), adult  (Daughter Ashley Lane  and her )   Do you expect to return to your current living situation?   (to be determine)   Do you have help at home or someone to help you manage your care at home? Yes   Who are your caregiver(s) and their phone number(s)? abelardo Ramirez   Prior to hospitilization cognitive status: Alert/Oriented   Current cognitive status: Alert/Oriented   Walking or Climbing Stairs Difficulty yes   Dressing/Bathing Difficulty no   Equipment Currently Used at Home rollator;walker, rolling;shower chair   Readmission within 30 days? No   How do you get to doctors appointments? family or friend will provide   Are you on dialysis? No   Do you take coumadin? No   Discharge Plan A Rehab   Discharge Plan B Home;Home with family;Home Health   Discharge Plan discussed with: Adult children   Transition of Care Barriers None   Physical Activity   On average, how many days per week do you engage in moderate to strenuous exercise (like a brisk walk)? 7 days   Financial Resource Strain   How hard is it for you to pay for the very basics like food, housing, medical care, and heating? Not very   Housing Stability   In the last 12 months, was there a time when you were  not able to pay the mortgage or rent on time? N   At any time in the past 12 months, were you homeless or living in a shelter (including now)? N   Transportation Needs   Has the lack of transportation kept you from medical appointments, meetings, work or from getting things needed for daily living? No   Food Insecurity   Within the past 12 months, you worried that your food would run out before you got the money to buy more. Never true   Within the past 12 months, the food you bought just didn't last and you didn't have money to get more. Never true   Stress   Do you feel stress - tense, restless, nervous, or anxious, or unable to sleep at night because your mind is troubled all the time - these days? Not at all   Social Isolation   How often do you feel lonely or isolated from those around you?  Never   Alcohol Use   Q1: How often do you have a drink containing alcohol? Never   Utilities   In the past 12 months has the electric, gas, oil, or water company threatened to shut off services in your home? No

## 2025-01-02 NOTE — PT/OT/SLP PROGRESS
Physical Therapy      Patient Name:  Stanley Granados   MRN:  [id number]    [id number]- Nsg Mitesh castillo RN/and charge nurse, at this time secondary to pt getting ready to have an ultrasound and MRI. Will follow-up next tx date.

## 2025-01-02 NOTE — ASSESSMENT & PLAN NOTE
Patient with history of stroke with left sided deficits now with new right-sided weakness and slurred speech.  CT Head:   No convincing acute intracranial abnormalities or evidence of large territory acute infarct.   Suspected remote lacunar infarct in the right thalamus.  Correlation needed.   There is patchy deep white matter low density as seen with microvascular chronic ischemic changes .  This limits detection for nonhemorrhagic acute white matter lacunar type infarcts.  Additional imaging can further evaluate these findings as clinically warranted.   Pan sinus disease with air-fluid levels in the maxillary and sphenoid sinuses.     Plan:  - Neuro checks q4hr.   - Follow up labs  - [facility] consulted; appreciate recs  - PT/OT/SLP consulted, appreciate recs  - F/U Cardiology recs 2/2 stenosis

## 2025-01-02 NOTE — ASSESSMENT & PLAN NOTE
Creatine stable for now. BMP reviewed- noted Estimated Creatinine Clearance: 47.5 mL/min (based on SCr of 1.2 mg/dL). according to latest data. Based on current GFR, CKD stage is stage 3 - GFR 30-59.  Monitor UOP and serial BMP and adjust therapy as needed. Renally dose meds. Avoid nephrotoxic medications and procedures.    Plan:  - Continue to monitor

## 2025-01-02 NOTE — PROGRESS NOTES
[facility] Medicine  Progress Note    Patient Name: Stanley Granados  MRN: [id number]  Patient Class: IP- Inpatient   Admission Date: 12/31/2024  Length of Stay: 2 days  Attending Physician: No att. providers found  Primary Care Provider: Phuong Umaña MD        Subjective     Principal Problem:Slurred speech        HPI:  Mr. Stanley Granados is an 83 y/o male with a PMHx of stroke with residual left-sided weakness, HTN, CKD 3b, and arthritis presents to the ED 12/31/24 with complaints of slurred speech and right-sided weakness. Per chart review, daughter at bedside during initial ED exam reported his last known normal was 4 days ago. He reportedly has a shuffling gait and denies any falls. Upon my assessment, patient with slurred speech limits history taking but he reports that his abdomen hurts. Denies nausea, vomiting, headache, chest pain, SOB, syncope.     In the ED, vitals notable for hypertension (170s/70s), CBC and CMP WNL, Trop negative, Lipid panel WNL, TSH WNL, Flu/Covid negative, CT Head: No convincing acute intracranial abnormalities or evidence of large territory acute infarct. Suspected remote lacunar infarct in the right thalamus.  Correlation needed. There is patchy deep white matter low density as seen with microvascular chronic ischemic changes .  This limits detection for nonhemorrhagic acute white matter lacunar type infarcts.  Additional imaging can further evaluate these findings as clinically warranted. Pan sinus disease with air-fluid levels in the maxillary and sphenoid sinuses. Per ED, patient did not pass bedside swallow. [facility] asked to admit for stroke rule out.     Overview/Hospital Course:  No notes on file    Interval History: Patient oriented to person and that he is in a hospital. Speech somewhat understandable. Pending Cardiology recs for ICA stenosis, carotid U/S, and neuro recs. May need placement given PT/OT recs. Abdominal pain improved    Review of  Systems   Constitutional:  Negative for chills and fever.   Respiratory:  Negative for shortness of breath.    Cardiovascular:  Negative for chest pain.   Gastrointestinal:  Negative for abdominal pain, nausea and vomiting.   Neurological:  Negative for dizziness and light-headedness.     Objective:     Vital Signs (Most Recent):  Temp: 97.5 °F (36.4 °C) (01/02/25 0718)  Pulse: 70 (01/02/25 0718)  Resp: 18 (01/02/25 0718)  BP: (!) 180/75 (01/02/25 0718)  SpO2: 99 % (01/02/25 0718) Vital Signs (24h Range):  Temp:  [97.1 °F (36.2 °C)-98.8 °F (37.1 °C)] 97.5 °F (36.4 °C)  Pulse:  [52-75] 70  Resp:  [17-25] 18  SpO2:  [94 %-100 %] 99 %  BP: (151-186)/(70-84) 180/75     Weight: 70.8 kg (156 lb 1.4 oz)  Body mass index is 22.4 kg/m².    Intake/Output Summary (Last 24 hours) at 1/2/2025 0830  Last data filed at 1/2/2025 0520  Gross per 24 hour   Intake --   Output 1100 ml   Net -1100 ml         Physical Exam  Constitutional:       General: He is not in acute distress.     Appearance: He is not toxic-appearing.   HENT:      Head: Normocephalic and atraumatic.      Nose: Nose normal.   Cardiovascular:      Pulses: Normal pulses.      Heart sounds: Normal heart sounds.   Pulmonary:      Effort: Pulmonary effort is normal.      Breath sounds: Normal breath sounds.   Abdominal:      Palpations: Abdomen is soft.      Tenderness: There is no abdominal tenderness. There is no guarding.   Skin:     General: Skin is warm and dry.   Neurological:      Mental Status: He is alert.      Motor: No weakness.   Psychiatric:         Mood and Affect: Mood normal.             Significant Labs: All pertinent labs within the past 24 hours have been reviewed.  CBC:   Recent Labs   Lab 12/31/24  1930 01/01/25  1319 01/02/25  0212   WBC 5.77 4.39  4.39 4.46  4.46   HGB 10.2* 9.3*  9.3* 9.9*  9.9*   HCT 30.2* 27.9*  27.9* 30.8*  30.8*    142*  142* 131*  131*     CMP:   Recent Labs   Lab 12/31/24  1930 01/01/25  1319 01/02/25  0219     139 137   K 4.3 4.2 4.4    108 107   CO2 20* 21* 18*   GLU 95 86 69*   BUN 22 16 18   CREATININE 1.3 1.1 1.2   CALCIUM 9.1 8.8 8.9   PROT 7.6 6.7 6.9   ALBUMIN 3.6 3.3* 3.3*   BILITOT 0.4 0.8 0.6   ALKPHOS 73 69 71   AST 17 14 18   ALT 14 12 14   ANIONGAP 12 10 12       Significant Imaging: I have reviewed all pertinent imaging results/findings within the past 24 hours.    Assessment and Plan     * Slurred speech  Patient with history of stroke with left sided deficits now with new right-sided weakness and slurred speech.  CT Head:   No convincing acute intracranial abnormalities or evidence of large territory acute infarct.   Suspected remote lacunar infarct in the right thalamus.  Correlation needed.   There is patchy deep white matter low density as seen with microvascular chronic ischemic changes .  This limits detection for nonhemorrhagic acute white matter lacunar type infarcts.  Additional imaging can further evaluate these findings as clinically warranted.   Pan sinus disease with air-fluid levels in the maxillary and sphenoid sinuses.     Plan:  - Neuro checks q4hr.   - Follow up labs  - [facility] consulted; appreciate recs  - PT/OT/SLP consulted, appreciate recs  - F/U Cardiology recs 2/2 stenosis    Abdominal pain  Patient reporting abdominal pain. Mild TTP over umbilicus. Patient VSS. Denies nausea and vomiting.    Plan:  - Continue to monitor        Hypertension  Patient's blood pressure range in the last 24 hours was: BP  Min: 151/70  Max: 186/79.The patient's inpatient anti-hypertensive regimen is listed below:  Current Antihypertensives  labetaloL injection 10 mg, Every 15 min PRN, Intravenous    Plan  - BP is controlled, no changes needed to their regimen  - Permissive HTN protocol in place  - Will restart home medications as appropriate    Stage 3b chronic kidney disease  Creatine stable for now. BMP reviewed- noted Estimated Creatinine Clearance: 47.5 mL/min (based on SCr of 1.2  mg/dL). according to latest data. Based on current GFR, CKD stage is stage 3 - GFR 30-59.  Monitor UOP and serial BMP and adjust therapy as needed. Renally dose meds. Avoid nephrotoxic medications and procedures.    Plan:  - Continue to monitor    Osteoarthritis of both knees  Patient with history of OA of both knees complaining of pain in the right knee.   XR Right knee: chronic findings stable from prior    Plan:  - Will manage pain as appropriate      VTE Risk Mitigation (From admission, onward)           Ordered     enoxaparin injection 40 mg  Daily         12/31/24 2258     IP VTE HIGH RISK PATIENT  Once         12/31/24 2258     Place sequential compression device  Until discontinued         12/31/24 2258                    Discharge Planning   VERONICA:      Code Status: Full Code   Medical Readiness for Discharge Date:                    Please place Justification for DME        Kali Cummings MD  Department of Hospital Medicine   [place] [facility]

## 2025-01-02 NOTE — ASSESSMENT & PLAN NOTE
Patient with history of OA of both knees complaining of pain in the right knee.   XR Right knee: chronic findings stable from prior    Plan:  - Will manage pain as appropriate

## 2025-01-02 NOTE — SUBJECTIVE & OBJECTIVE
Past Medical History:   Diagnosis Date    Arthritis     Hypertension     Renal disorder     Stroke        History reviewed. No pertinent surgical history.    Review of patient's allergies indicates:   Allergen Reactions    Nsaids (non-steroidal anti-inflammatory drug)        No current facility-administered medications on file prior to encounter.     Current Outpatient Medications on File Prior to Encounter   Medication Sig    amLODIPine (NORVASC) 5 MG tablet Take 1 tablet (5 mg total) by mouth once daily.    carvediloL (COREG) 25 MG tablet Take 1 tablet (25 mg total) by mouth 2 (two) times daily.    ferrous gluconate (FERGON) 324 MG tablet Take 1 tablet (324 mg total) by mouth every morning.    magnesium oxide (MAG-OX) 400 mg (241.3 mg magnesium) tablet Take 1 tablet (400 mg total) by mouth once daily.    nitroGLYCERIN (NITROSTAT) 0.4 MG SL tablet Place 1 tablet (0.4 mg total) under the tongue every 5 (five) minutes as needed for Chest pain.    atorvastatin (LIPITOR) 40 MG tablet Take 1 tablet (40 mg total) by mouth once daily. (Patient not taking: Reported on 12/31/2024)    fexofenadine (ALLEGRA) 180 MG tablet Take 1 tablet (180 mg total) by mouth once daily. (Patient not taking: Reported on 12/31/2024)    potassium chloride (KLOR-CON) 8 MEQ TbSR Take 8 mEq by mouth once daily. (Patient not taking: Reported on 12/31/2024)    ranolazine (RANEXA) 1,000 mg Tb12 Take 1 tablet (1,000 mg total) by mouth 2 (two) times daily. (Patient not taking: Reported on 12/31/2024)    sodium bicarbonate 650 MG tablet Take 1 tablet (650 mg total) by mouth 2 (two) times daily. (Patient not taking: Reported on 12/31/2024)     Family History       Problem Relation (Age of Onset)    COPD Father    Diabetes Brother, Daughter, Son    Hyperlipidemia Daughter    Hypertension Mother, Brother, Daughter, Son    Lupus Daughter    Lymphoma Daughter    No Known Problems Sister, Brother          Tobacco Use    Smoking status: Former     Current  packs/day: 0.00     Types: Cigarettes     Quit date: 1994     Years since quittin.6     Passive exposure: Never    Smokeless tobacco: Never   Substance and Sexual Activity    Alcohol use: Not Currently    Drug use: Not Currently    Sexual activity: Not Currently     Review of Systems   Constitutional: Negative. Negative for diaphoresis.   HENT: Negative.     Eyes: Negative.    Cardiovascular:  Negative for chest pain, irregular heartbeat, leg swelling, near-syncope, orthopnea, palpitations, paroxysmal nocturnal dyspnea and syncope.   Respiratory: Negative.  Negative for cough.    Endocrine: Negative.    Hematologic/Lymphatic: Negative.    Gastrointestinal:  Negative for nausea and vomiting.   Genitourinary: Negative.    Neurological:  Positive for focal weakness. Negative for dizziness and light-headedness.   Psychiatric/Behavioral: Negative.       Objective:     Vital Signs (Most Recent):  Temp: 97.5 °F (36.4 °C) (25)  Pulse: 70 (25)  Resp: 18 (25)  BP: (!) 180/75 (25)  SpO2: 99 % (25) Vital Signs (24h Range):  Temp:  [97.1 °F (36.2 °C)-98.8 °F (37.1 °C)] 97.5 °F (36.4 °C)  Pulse:  [52-75] 70  Resp:  [17-25] 18  SpO2:  [94 %-100 %] 99 %  BP: (151-186)/(70-84) 180/75     Weight: 70.8 kg (156 lb 1.4 oz)  Body mass index is 22.4 kg/m².    SpO2: 99 %         Intake/Output Summary (Last 24 hours) at 2025 1031  Last data filed at 2025 0520  Gross per 24 hour   Intake --   Output 1100 ml   Net -1100 ml       Lines/Drains/Airways       Drain  Duration             Male External Urinary Catheter 25 0912 1 day              Peripheral Intravenous Line  Duration                  Peripheral IV - Single Lumen 24 1923 20 G Yes Anterior;Distal;Right Upper Arm 1 day                     Physical Exam  Constitutional:       General: He is not in acute distress.     Appearance: He is not diaphoretic.   HENT:      Head: Atraumatic.   Eyes:      General:   "       Right eye: No discharge.         Left eye: No discharge.   Cardiovascular:      Rate and Rhythm: Normal rate and regular rhythm.   Pulmonary:      Effort: Pulmonary effort is normal.      Breath sounds: Normal breath sounds.   Abdominal:      General: Bowel sounds are normal.      Palpations: Abdomen is soft.   Musculoskeletal:      Right lower leg: No edema.      Left lower leg: No edema.   Skin:     General: Skin is warm and dry.   Neurological:      Mental Status: He is alert. Mental status is at baseline.          Significant Labs: BMP:   Recent Labs   Lab 12/31/24 1930 01/01/25 1319 01/02/25  0212   GLU 95 86 69*    139 137   K 4.3 4.2 4.4    108 107   CO2 20* 21* 18*   BUN 22 16 18   CREATININE 1.3 1.1 1.2   CALCIUM 9.1 8.8 8.9   MG  --  2.1 2.2   , CMP   Recent Labs   Lab 12/31/24 1930 01/01/25 1319 01/02/25  0212    139 137   K 4.3 4.2 4.4    108 107   CO2 20* 21* 18*   GLU 95 86 69*   BUN 22 16 18   CREATININE 1.3 1.1 1.2   CALCIUM 9.1 8.8 8.9   PROT 7.6 6.7 6.9   ALBUMIN 3.6 3.3* 3.3*   BILITOT 0.4 0.8 0.6   ALKPHOS 73 69 71   AST 17 14 18   ALT 14 12 14   ANIONGAP 12 10 12   , CBC   Recent Labs   Lab 12/31/24 1930 01/01/25 1319 01/02/25 0212   WBC 5.77 4.39  4.39 4.46  4.46   HGB 10.2* 9.3*  9.3* 9.9*  9.9*   HCT 30.2* 27.9*  27.9* 30.8*  30.8*    142*  142* 131*  131*   , INR   Recent Labs   Lab 12/31/24 1930 01/01/25 1319   INR 1.0 1.1   , Lipid Panel   Recent Labs   Lab 12/31/24 1930   CHOL 142   HDL 45   LDLCALC 84.2   TRIG 64   CHOLHDL 31.7   , Troponin No results for input(s): "TROPONINIHS" in the last 48 hours., and All pertinent lab results from the last 24 hours have been reviewed.    Significant Imaging: Echocardiogram: Transthoracic echo (TTE) complete (Cupid Only):   Results for orders placed or performed during the hospital encounter of 11/17/24   Echo   Result Value Ref Range    BSA 1.85 m2    Quach's Biplane MOD Ejection Fraction 64 " %    A2C EF 65 %    A4C EF 71 %    LVOT stroke volume 207.3 cm3    LVIDd 4.9 3.5 - 6.0 cm    LV Systolic Volume 44.06 mL    LV Systolic Volume Index 23.7 mL/m2    LVIDs 3.3 2.1 - 4.0 cm    LV ESV A2C 126.41 mL    LV Diastolic Volume 113.17 mL    LV ESV A4C 124.00 mL    LV Diastolic Volume Index 60.84 mL/m2    LV EDV A2C 69.807680452926444 mL    LV EDV A4C 60.98 mL    Left Ventricular End Systolic Volume by Teichholz Method 44.06 mL    Left Ventricular End Diastolic Volume by Teichholz Method 113.17 mL    IVS 1.7 (A) 0.6 - 1.1 cm    LVOT diameter 2.9 cm    LVOT area 6.6 cm2    FS 32.7 28 - 44 %    Left Ventricle Relative Wall Thickness 0.73 cm    PW 1.8 (A) 0.6 - 1.1 cm    LV mass 395.8 g    LV Mass Index 212.8 g/m2    MV Peak E Bill 1.01 m/s    TDI LATERAL 0.06 m/s    TDI SEPTAL 0.06 m/s    E/E' ratio 16.83 m/s    MV Peak A Bill 0.76 m/s    TR Max Bill 2.79 m/s    E/A ratio 1.33     E wave deceleration time 254.79 msec    LV SEPTAL E/E' RATIO 16.83 m/s    LV LATERAL E/E' RATIO 16.83 m/s    PV Peak S Bill 0.68 m/s    PV Peak D Bill 0.42 m/s    Pulm vein S/D ratio 1.62     LVOT peak bill 1.3 m/s    Left Ventricular Outflow Tract Mean Velocity 1.03 cm/s    Left Ventricular Outflow Tract Mean Gradient 4.55 mmHg    RV- torres basal diam 3.9 cm    RV S' 12.41 cm/s    TAPSE 2.19 cm    RV/LV Ratio 0.80 cm    LA Vol (MOD) 131.30 mL    SLOANE (MOD) 70.6 mL/m2    RA area length vol 65.33 mL    RA Area 22.0 cm2    RA Vol 68.34 mL    AV regurgitation pressure 1/2 time [id number] ms    AR Max Bill 4.11 m/s    AV mean gradient 9.4 mmHg    AV peak gradient 13.0 mmHg    Ao peak bill 1.8 m/s    Ao VTI 41.9 cm    LVOT peak VTI 31.4 cm    AV valve area 4.9 cm²    AV Velocity Ratio 0.72     AV index (prosthetic) 0.75     GIANA by Velocity Ratio 4.8 cm²    Mr max bill 4.93 m/s    MV mean gradient 2 mmHg    MV peak gradient 4 mmHg    MV valve area by continuity eq 6.56 cm2    MV VTI 31.6 cm    Triscuspid Valve Regurgitation Peak Gradient 31 mmHg     PV PEAK VELOCITY 0.91 m/s    PV peak gradient 3 mmHg    Pulmonary Valve Mean Velocity 0.66 m/s    Sinus 3.64 cm    STJ 2.52 cm    Ascending aorta 3.21 cm    IVC diameter 1.29 cm    Mean e' 0.06 m/s    ZLVIDS 0.26     ZLVIDD -0.55     LA area A4C 36.09 cm2    LA area A2C 33.69 cm2    TV resting pulmonary artery pressure 34 mmHg    RV TB RVSP 6 mmHg    Est. RA pres 3 mmHg    Narrative      Left Ventricle: The left ventricle is normal in size. Moderate septal   thickening. There is concentric hypertrophy. There is normal systolic   function. Biplane (2D) method of discs ejection fraction is 64%. Grade II   diastolic dysfunction. Elevated left ventricular filling pressure.    Right Ventricle: Normal right ventricular cavity size. Wall thickness   is normal. Systolic function is normal.    Left Atrium: Left atrium is severely dilated.    Aortic Valve: The aortic valve is a trileaflet valve. There is moderate   aortic valve sclerosis. Mildly restricted motion. There is moderate aortic   regurgitation with an anteromedial eccentrically directed jet.    Pulmonary Artery: The estimated pulmonary artery systolic pressure is   34 mmHg.    IVC/SVC: Normal venous pressure at 3 mmHg.

## 2025-01-02 NOTE — PLAN OF CARE
Problem: Adult Inpatient Plan of Care  Goal: Plan of Care Review  Outcome: Progressing  Flowsheets (Taken 1/2/2025 0207)  Plan of Care Reviewed With: patient     Problem: Stroke, Ischemic (Includes Transient Ischemic Attack)  Goal: Optimal Coping  Outcome: Progressing  Intervention: Support Psychosocial Response to Stroke  Flowsheets (Taken 1/2/2025 0207)  Supportive Measures:   active listening utilized   positive reinforcement provided   self-responsibility promoted   verbalization of feelings encouraged   relaxation techniques promoted   problem-solving facilitated   self-care encouraged   self-reflection promoted     Problem: Fall Injury Risk  Goal: Absence of Fall and Fall-Related Injury  Outcome: Progressing  Intervention: Identify and Manage Contributors  Flowsheets (Taken 1/2/2025 0207)  Self-Care Promotion:   independence encouraged   BADL personal objects within reach   BADL personal routines maintained  Medication Review/Management:   medications reviewed   high-risk medications identified     Problem: Infection  Goal: Absence of Infection Signs and Symptoms  Outcome: Progressing  Intervention: Prevent or Manage Infection  Flowsheets (Taken 1/2/2025 0207)  Fever Reduction/Comfort Measures:   lightweight clothing   lightweight bedding  Infection Management: aseptic technique maintained     Problem: Comorbidity Management  Goal: Blood Pressure in Desired Range  Outcome: Progressing  Intervention: Maintain Blood Pressure Management  Flowsheets (Taken 1/2/2025 0207)  Medication Review/Management:   medications reviewed   high-risk medications identified   Plan of care progressing.

## 2025-01-02 NOTE — NURSING
Rapid Response Nurse Note     20g PIV placed in right forearm using ultrasound guidance. Blood return noted. Flushed and saline locked.    Please call Rapid Response RN, SACHI Brewer with any questions or concerns at [phone]

## 2025-01-02 NOTE — PT/OT/SLP PROGRESS
Speech Language Pathology      Stanley Granados  MRN: [id number]  [id number]/[id number] A    Patient not seen today secondary to patient BRENDAN upon multiple attempts. ST will follow up next therapy date.

## 2025-01-02 NOTE — ASSESSMENT & PLAN NOTE
Patient's blood pressure range in the last 24 hours was: BP  Min: 151/70  Max: 186/79.The patient's inpatient anti-hypertensive regimen is listed below:  Current Antihypertensives  labetaloL injection 10 mg, Every 15 min PRN, Intravenous  amLODIPine tablet 5 mg, Daily, Oral    Resume home antihypertensives once cleared by neuro

## 2025-01-02 NOTE — SUBJECTIVE & OBJECTIVE
Interval History: Patient oriented to person and that he is in a hospital. Speech somewhat understandable. Pending Cardiology recs for ICA stenosis, carotid U/S, and neuro recs. May need placement given PT/OT recs. Abdominal pain improved    Review of Systems   Constitutional:  Negative for chills and fever.   Respiratory:  Negative for shortness of breath.    Cardiovascular:  Negative for chest pain.   Gastrointestinal:  Negative for abdominal pain, nausea and vomiting.   Neurological:  Negative for dizziness and light-headedness.     Objective:     Vital Signs (Most Recent):  Temp: 97.5 °F (36.4 °C) (01/02/25 0718)  Pulse: 70 (01/02/25 0718)  Resp: 18 (01/02/25 0718)  BP: (!) 180/75 (01/02/25 0718)  SpO2: 99 % (01/02/25 0718) Vital Signs (24h Range):  Temp:  [97.1 °F (36.2 °C)-98.8 °F (37.1 °C)] 97.5 °F (36.4 °C)  Pulse:  [52-75] 70  Resp:  [17-25] 18  SpO2:  [94 %-100 %] 99 %  BP: (151-186)/(70-84) 180/75     Weight: 70.8 kg (156 lb 1.4 oz)  Body mass index is 22.4 kg/m².    Intake/Output Summary (Last 24 hours) at 1/2/2025 0830  Last data filed at 1/2/2025 0520  Gross per 24 hour   Intake --   Output 1100 ml   Net -1100 ml         Physical Exam  Constitutional:       General: He is not in acute distress.     Appearance: He is not toxic-appearing.   HENT:      Head: Normocephalic and atraumatic.      Nose: Nose normal.   Cardiovascular:      Pulses: Normal pulses.      Heart sounds: Normal heart sounds.   Pulmonary:      Effort: Pulmonary effort is normal.      Breath sounds: Normal breath sounds.   Abdominal:      Palpations: Abdomen is soft.      Tenderness: There is no abdominal tenderness. There is no guarding.   Skin:     General: Skin is warm and dry.   Neurological:      Mental Status: He is alert.      Motor: No weakness.   Psychiatric:         Mood and Affect: Mood normal.             Significant Labs: All pertinent labs within the past 24 hours have been reviewed.  CBC:   Recent Labs   Lab  12/31/24 1930 01/01/25 1319 01/02/25 0212   WBC 5.77 4.39  4.39 4.46  4.46   HGB 10.2* 9.3*  9.3* 9.9*  9.9*   HCT 30.2* 27.9*  27.9* 30.8*  30.8*    142*  142* 131*  131*     CMP:   Recent Labs   Lab 12/31/24 1930 01/01/25 1319 01/02/25 0212    139 137   K 4.3 4.2 4.4    108 107   CO2 20* 21* 18*   GLU 95 86 69*   BUN 22 16 18   CREATININE 1.3 1.1 1.2   CALCIUM 9.1 8.8 8.9   PROT 7.6 6.7 6.9   ALBUMIN 3.6 3.3* 3.3*   BILITOT 0.4 0.8 0.6   ALKPHOS 73 69 71   AST 17 14 18   ALT 14 12 14   ANIONGAP 12 10 12       Significant Imaging: I have reviewed all pertinent imaging results/findings within the past 24 hours.

## 2025-01-02 NOTE — ASSESSMENT & PLAN NOTE
CT with KIMBERLEY 50% LICA 60%  US pending  Continue medical management with DAPT statin  Further recs to follow- will consider referral as OP if significant stenosis

## 2025-01-03 LAB
ALBUMIN SERPL BCP-MCNC: 3.4 G/DL (ref 3.5–5.2)
ALP SERPL-CCNC: 70 U/L (ref 40–150)
ALT SERPL W/O P-5'-P-CCNC: 13 U/L (ref 10–44)
ANION GAP SERPL CALC-SCNC: 11 MMOL/L (ref 8–16)
AST SERPL-CCNC: 14 U/L (ref 10–40)
BASOPHILS # BLD AUTO: 0.03 K/UL (ref 0–0.2)
BASOPHILS NFR BLD: 0.5 % (ref 0–1.9)
BILIRUB SERPL-MCNC: 0.7 MG/DL (ref 0.1–1)
BUN SERPL-MCNC: 20 MG/DL (ref 8–23)
CALCIUM SERPL-MCNC: 8.9 MG/DL (ref 8.7–10.5)
CHLORIDE SERPL-SCNC: 107 MMOL/L (ref 95–110)
CO2 SERPL-SCNC: 21 MMOL/L (ref 23–29)
CREAT SERPL-MCNC: 1.2 MG/DL (ref 0.5–1.4)
DIFFERENTIAL METHOD BLD: ABNORMAL
EOSINOPHIL # BLD AUTO: 0.2 K/UL (ref 0–0.5)
EOSINOPHIL NFR BLD: 3.7 % (ref 0–8)
ERYTHROCYTE [DISTWIDTH] IN BLOOD BY AUTOMATED COUNT: 15.9 % (ref 11.5–14.5)
EST. GFR  (NO RACE VARIABLE): >60 ML/MIN/1.73 M^2
GLUCOSE SERPL-MCNC: 79 MG/DL (ref 70–110)
HCT VFR BLD AUTO: 31.4 % (ref 40–54)
HGB BLD-MCNC: 10.4 G/DL (ref 14–18)
IMM GRANULOCYTES # BLD AUTO: 0.01 K/UL (ref 0–0.04)
IMM GRANULOCYTES NFR BLD AUTO: 0.2 % (ref 0–0.5)
LYMPHOCYTES # BLD AUTO: 2 K/UL (ref 1–4.8)
LYMPHOCYTES NFR BLD: 34.3 % (ref 18–48)
MAGNESIUM SERPL-MCNC: 2.1 MG/DL (ref 1.6–2.6)
MCH RBC QN AUTO: 28.3 PG (ref 27–31)
MCHC RBC AUTO-ENTMCNC: 33.1 G/DL (ref 32–36)
MCV RBC AUTO: 85 FL (ref 82–98)
MONOCYTES # BLD AUTO: 0.5 K/UL (ref 0.3–1)
MONOCYTES NFR BLD: 9.1 % (ref 4–15)
NEUTROPHILS # BLD AUTO: 3.1 K/UL (ref 1.8–7.7)
NEUTROPHILS NFR BLD: 52.2 % (ref 38–73)
NRBC BLD-RTO: 0 /100 WBC
OHS QRS DURATION: 96 MS
OHS QTC CALCULATION: 470 MS
PHOSPHATE SERPL-MCNC: 4.4 MG/DL (ref 2.7–4.5)
PLATELET # BLD AUTO: 161 K/UL (ref 150–450)
PMV BLD AUTO: 12 FL (ref 9.2–12.9)
POCT GLUCOSE: 80 MG/DL (ref 70–110)
POCT GLUCOSE: 88 MG/DL (ref 70–110)
POCT GLUCOSE: 89 MG/DL (ref 70–110)
POCT GLUCOSE: 99 MG/DL (ref 70–110)
POTASSIUM SERPL-SCNC: 4 MMOL/L (ref 3.5–5.1)
PROT SERPL-MCNC: 7 G/DL (ref 6–8.4)
RBC # BLD AUTO: 3.68 M/UL (ref 4.6–6.2)
SODIUM SERPL-SCNC: 139 MMOL/L (ref 136–145)
WBC # BLD AUTO: 5.92 K/UL (ref 3.9–12.7)

## 2025-01-03 PROCEDURE — 25000003 PHARM REV CODE 250

## 2025-01-03 PROCEDURE — 85025 COMPLETE CBC W/AUTO DIFF WBC: CPT

## 2025-01-03 PROCEDURE — 84100 ASSAY OF PHOSPHORUS: CPT

## 2025-01-03 PROCEDURE — 97116 GAIT TRAINING THERAPY: CPT | Mod: CQ

## 2025-01-03 PROCEDURE — 83735 ASSAY OF MAGNESIUM: CPT

## 2025-01-03 PROCEDURE — 36415 COLL VENOUS BLD VENIPUNCTURE: CPT

## 2025-01-03 PROCEDURE — 97535 SELF CARE MNGMENT TRAINING: CPT | Mod: CO

## 2025-01-03 PROCEDURE — 97530 THERAPEUTIC ACTIVITIES: CPT | Mod: CO

## 2025-01-03 PROCEDURE — 94761 N-INVAS EAR/PLS OXIMETRY MLT: CPT

## 2025-01-03 PROCEDURE — 11000001 HC ACUTE MED/SURG PRIVATE ROOM

## 2025-01-03 PROCEDURE — 80053 COMPREHEN METABOLIC PANEL: CPT

## 2025-01-03 RX ORDER — DIPHENHYDRAMINE HCL 25 MG
50 CAPSULE ORAL ONCE
Status: DISCONTINUED | OUTPATIENT
Start: 2025-01-03 | End: 2025-01-04 | Stop reason: HOSPADM

## 2025-01-03 RX ADMIN — AMLODIPINE BESYLATE 5 MG: 5 TABLET ORAL at 08:01

## 2025-01-03 RX ADMIN — ASPIRIN 81 MG CHEWABLE TABLET 81 MG: 81 TABLET CHEWABLE at 08:01

## 2025-01-03 RX ADMIN — CYANOCOBALAMIN TAB 1000 MCG 1000 MCG: 1000 TAB at 08:01

## 2025-01-03 RX ADMIN — CLOPIDOGREL BISULFATE 75 MG: 75 TABLET ORAL at 08:01

## 2025-01-03 RX ADMIN — ATORVASTATIN CALCIUM 40 MG: 40 TABLET, FILM COATED ORAL at 08:01

## 2025-01-03 NOTE — PT/OT/SLP PROGRESS
"Occupational Therapy   Treatment    Name: Stanley Granados  MRN: [id number]  Admitting Diagnosis:  Slurred speech       Recommendations:     Discharge Recommendations: High Intensity Therapy  Discharge Equipment Recommendations:  to be determined by next level of care  Barriers to discharge:  Decreased caregiver support, Other (Comment) (Increased level of assistance)    Assessment:     Stanley Granados is a 84 y.o. male with a medical diagnosis of Slurred speech.   Performance deficits affecting function are weakness, impaired endurance, impaired self care skills, impaired functional mobility, gait instability, impaired balance, decreased upper extremity function, decreased lower extremity function, decreased safety awareness, abnormal tone, decreased ROM, impaired coordination, impaired fine motor, impaired skin, impaired joint extensibility.    Pt found in bed, agreeable to therapy.  Pt progressing well towards goals.  He is very motivated to return to Prior level of functioning. Rec High Intensity Therapy Post Acute Care  at time of discharge to maximize [place] and safety with ADL's and functional mobility. Continue OT services to address functional goals, progressing as able.      Rehab Prognosis:  Good; patient would benefit from acute skilled OT services to address these deficits and reach maximum level of function.       Plan:     Patient to be seen 5 x/week to address the above listed problems via self-care/home management, therapeutic exercises, therapeutic activities, neuromuscular re-education  Plan of Care Expires: 01/31/25  Plan of Care Reviewed with: patient    Subjective     Chief Complaint: "I like to walk."  Patient/Family Comments/goals: to go home   Pain/Comfort:  Pain Rating 1: 0/10  Pain Rating Post-Intervention 1: 0/10    Objective:     Communicated with: nurse prior to session.  Patient found HOB elevated with bed alarm, peripheral IV, telemetry, Condom Catheter upon OT entry to room.    General " Precautions: Standard, fall    Orthopedic Precautions:N/A  Braces: N/A  Respiratory Status: Room air     Occupational Performance:     Bed Mobility:    Patient completed Rolling/Turning to Left with  minimum assistance  Patient completed Scooting/Bridging with contact guard assistance  Patient completed Supine to Sit with minimum assistance HOB elevated, use of bed rail, increased time and effort, vc's for effective technique      Functional Mobility/Transfers:  Patient completed Sit <> Stand Transfer with contact guard assistance with rolling walker and vcs for hand placement when sitting<>standing  Patient completed Bed <> Chair Transfer using Stand Pivot technique with contact guard assistance and minimum assistance with rolling walker  Functional Mobility: Pt ambulated room distances with CGA/Min A using RW.  Pt requires vcs for RW safety/mgmt.     Activities of Daily Living:  Grooming: stand by assistance and minimum assistance at chair level.  Pt with impaired FMC/strength B'ly. Pt with minimal shld ROM B'ly.   Lower Body Dressing: moderate assistance don socks       AMPAC 6 Click ADL: 15    Treatment & Education:  Pt scoots seated to EOB with CGA.  Pt sits EOB with CGA initially progressing to SBA once feet on floor.   Encouraged OOB in chair 1-2 hours and to call for assistance for back to bed and/or toileting needs. Pt verbalizes understanding.        Patient left up in chair with all lines intact, call button in reach, chair alarm on, and nurse notified    GOALS:   Multidisciplinary Problems       Occupational Therapy Goals          Problem: Occupational Therapy    Goal Priority Disciplines Outcome Interventions   Occupational Therapy Goal     OT, PT/OT Progressing    Description: Goals to be met by: 01/31/25     Patient will increase functional independence with ADLs by performing:    UE Dressing with Set-up Assistance.  LE Dressing with Set-up Assistance.  Grooming while standing at sink with Contact  Guard Assistance.  Toileting from bedside commode with Minimal Assistance for hygiene and clothing management.   Toilet transfer to bedside commode with Contact Guard Assistance.  Upper extremity exercise program 3x10 reps per handout, with assistance as needed.                         Time Tracking:     OT Date of Treatment: 01/03/25  OT Start Time: 0920  OT Stop Time: 0950  OT Total Time (min): 30 min    Billable Minutes:Self Care/Home Management 10  Therapeutic Activity 20               1/3/2025

## 2025-01-03 NOTE — PLAN OF CARE
Problem: Occupational Therapy  Goal: Occupational Therapy Goal  Description: Goals to be met by: 01/31/25     Patient will increase functional independence with ADLs by performing:    UE Dressing with Set-up Assistance.  LE Dressing with Set-up Assistance.  Grooming while standing at sink with Contact Guard Assistance.  Toileting from bedside commode with Minimal Assistance for hygiene and clothing management.   Toilet transfer to bedside commode with Contact Guard Assistance.  Upper extremity exercise program 3x10 reps per handout, with assistance as needed.    Outcome: Pili Granados is a 84 y.o. male with a medical diagnosis of Slurred speech.   Performance deficits affecting function are weakness, impaired endurance, impaired self care skills, impaired functional mobility, gait instability, impaired balance, decreased upper extremity function, decreased lower extremity function, decreased safety awareness, abnormal tone, decreased ROM, impaired coordination, impaired fine motor, impaired skin, impaired joint extensibility.    Pt found in bed, agreeable to therapy.  Pt progressing well towards goals.  He is very motivated to return to Prior level of functioning. Rec High Intensity Therapy Post Acute Care  at time of discharge to maximize [place] and safety with ADL's and functional mobility. Continue OT services to address functional goals, progressing as able.

## 2025-01-03 NOTE — PLAN OF CARE
Problem: Adult Inpatient Plan of Care  Goal: Plan of Care Review  Outcome: Progressing  Flowsheets (Taken 1/3/2025 0234)  Plan of Care Reviewed With: patient     Problem: Stroke, Ischemic (Includes Transient Ischemic Attack)  Goal: Optimal Coping  Outcome: Progressing  Intervention: Support Psychosocial Response to Stroke  Flowsheets (Taken 1/3/2025 0234)  Supportive Measures:   active listening utilized   self-responsibility promoted   verbalization of feelings encouraged   positive reinforcement provided   relaxation techniques promoted   self-care encouraged   self-reflection promoted     Problem: Fall Injury Risk  Goal: Absence of Fall and Fall-Related Injury  Outcome: Progressing  Intervention: Identify and Manage Contributors  Flowsheets (Taken 1/3/2025 0234)  Self-Care Promotion:   independence encouraged   BADL personal objects within reach   BADL personal routines maintained  Medication Review/Management:   medications reviewed   high-risk medications identified     Problem: Comorbidity Management  Goal: Blood Pressure in Desired Range  Outcome: Progressing  Intervention: Maintain Blood Pressure Management  Flowsheets (Taken 1/3/2025 0234)  Medication Review/Management:   medications reviewed   high-risk medications identified     Problem: Skin Injury Risk Increased  Goal: Skin Health and Integrity  Outcome: Progressing  Intervention: Optimize Skin Protection  Flowsheets (Taken 1/3/2025 0234)  Pressure Reduction Techniques: weight shift assistance provided  Skin Protection:   transparent dressing maintained   incontinence pads utilized  Activity Management: Rolling - L1  Head of Bed (HOB) Positioning: HOB elevated   Plan of care progressing.

## 2025-01-03 NOTE — SUBJECTIVE & OBJECTIVE
Interval History: Patient doing well this AM, eating, speech is legible. PT/OT recommending high intensity therapy. Will follow up Cardiology and Neuro recs.    Review of Systems   Constitutional:  Negative for chills and fever.   Respiratory:  Negative for shortness of breath.    Cardiovascular:  Negative for chest pain.   Gastrointestinal:  Negative for abdominal pain.     Objective:     Vital Signs (Most Recent):  Temp: 97.5 °F (36.4 °C) (01/03/25 0813)  Pulse: 62 (01/03/25 0813)  Resp: 18 (01/03/25 0813)  BP: (!) 146/62 (01/03/25 0813)  SpO2: 97 % (01/03/25 0813) Vital Signs (24h Range):  Temp:  [97 °F (36.1 °C)-98.7 °F (37.1 °C)] 97.5 °F (36.4 °C)  Pulse:  [58-78] 62  Resp:  [17-18] 18  SpO2:  [96 %-99 %] 97 %  BP: (135-169)/(60-77) 146/62     Weight: 70.8 kg (156 lb)  Body mass index is 22.38 kg/m².    Intake/Output Summary (Last 24 hours) at 1/3/2025 1041  Last data filed at 1/3/2025 0645  Gross per 24 hour   Intake --   Output 1100 ml   Net -1100 ml         Physical Exam  Constitutional:       General: He is not in acute distress.     Appearance: He is not toxic-appearing.   HENT:      Head: Normocephalic and atraumatic.      Nose: Nose normal.   Cardiovascular:      Pulses: Normal pulses.      Heart sounds: Normal heart sounds.   Pulmonary:      Effort: Pulmonary effort is normal.      Breath sounds: Normal breath sounds.   Skin:     General: Skin is warm and dry.   Neurological:      Mental Status: He is alert.   Psychiatric:         Mood and Affect: Mood normal.             Significant Labs: All pertinent labs within the past 24 hours have been reviewed.  CBC:   Recent Labs   Lab 01/01/25  1319 01/02/25  0212 01/03/25  0210   WBC 4.39  4.39 4.46  4.46 5.92   HGB 9.3*  9.3* 9.9*  9.9* 10.4*   HCT 27.9*  27.9* 30.8*  30.8* 31.4*   *  142* 131*  131* 161     CMP:   Recent Labs   Lab 01/01/25  1319 01/02/25  0212 01/03/25  0210    137 139   K 4.2 4.4 4.0    107 107   CO2 21* 18* 21*    GLU 86 69* 79   BUN 16 18 20   CREATININE 1.1 1.2 1.2   CALCIUM 8.8 8.9 8.9   PROT 6.7 6.9 7.0   ALBUMIN 3.3* 3.3* 3.4*   BILITOT 0.8 0.6 0.7   ALKPHOS 69 71 70   AST 14 18 14   ALT 12 14 13   ANIONGAP 10 12 11       Significant Imaging: I have reviewed all pertinent imaging results/findings within the past 24 hours.

## 2025-01-03 NOTE — PROGRESS NOTES
NEUROLOGY PROGRESS NOTE    Subjective:    This AM Mr. Granados is found awake sitting upright in a chair watching TV. He reports improved strength in RUE and that his speech is no longer slurred.     MRI brain pending to complete stroke workup    Objective:     Vital Signs:   Vitals:    01/03/25 0813   BP: (!) 146/62   Pulse: 62   Resp: 18   Temp: 97.5 °F (36.4 °C)        Neurological Examination   Gen: Oriented to person and place, not year   Language: No dysarthria or aphasia    CN  CN II - PERRLA  CN III, IV, VI - EOM intact without nystagmus  CN V1, V2, V3 - Facial sensation preserved bilaterally  CN VII - Patient is able to smile and raise eyebrows symmetrically  CN VIII - No hearing deficit  CN XI - Motor strength of shoulder shrug is 5/5 bilaterally  CN XII - Tongue protrudes midline without fasciculation    Motor  Diffuse muscle wasting   LUE 4/5 hand , triceps  RUE 5/5 hand , biceps, and triceps  LLE 4/5   RLE 4/5    Sensory: Intact to light touch in BUE and BLE, no neglect    Cerebellar: No dysmetria on finger to nose bilaterally    Gait: Deferred    LABORATORY STUDIES:  CBC:   Recent Labs   Lab 01/01/25  1319 01/02/25 0212 01/03/25  0210   WBC 4.39  4.39 4.46  4.46 5.92   HGB 9.3*  9.3* 9.9*  9.9* 10.4*   HCT 27.9*  27.9* 30.8*  30.8* 31.4*   *  142* 131*  131* 161   MCV 86  86 88  88 85   RDW 16.2*  16.2* 16.2*  16.2* 15.9*     BMP:   Recent Labs   Lab 01/01/25  1319 01/02/25  0212 01/03/25  0210    137 139   K 4.2 4.4 4.0    107 107   CO2 21* 18* 21*   BUN 16 18 20   CREATININE 1.1 1.2 1.2   GLU 86 69* 79   CALCIUM 8.8 8.9 8.9   MG 2.1 2.2 2.1   PHOS 4.1 4.2 4.4     LFTs:   Recent Labs   Lab 01/01/25  1319 01/02/25  0212 01/03/25  0210   PROT 6.7 6.9 7.0   ALBUMIN 3.3* 3.3* 3.4*   BILITOT 0.8 0.6 0.7   AST 14 18 14   ALKPHOS 69 71 70   ALT 12 14 13     Coags:   Recent Labs   Lab 12/31/24 1930 01/01/25  1319   INR 1.0 1.1     FLP:   Recent Labs   Lab 12/31/24 1930    CHOL 142   LDLCALC 84.2   TRIG 64   CHOLHDL 31.7     DM:   Recent Labs   Lab 12/31/24  1930 01/01/25  1319 01/02/25  0212 01/03/25  0210   HGBA1C  --  5.5  --   --    LDLCALC 84.2  --   --   --    CREATININE 1.3 1.1 1.2 1.2     Thyroid:   Recent Labs   Lab 12/31/24 1930   TSH 3.304     Anemia:   Recent Labs   Lab 01/01/25  1319   EYBKSUUR60 164*   FOLATE 10.5     Cardiac:   Recent Labs   Lab 12/31/24 1930   TROPONINI <0.006     Urinalysis:   Recent Labs   Lab 01/01/25  0141   COLORU Yellow   APPEARANCEUA Clear   PHUR 7.0   SPECGRAV 1.010   PROTEINUA Trace*   GLUCUA Negative   KETONESU Negative   BILIRUBINUA Negative   OCCULTUA 2+*   NITRITE Negative   UROBILINOGEN Negative   LEUKOCYTESUR Negative     Urine Micro:  Recent Labs   Lab 01/01/25  0141   RBCUA 95*   WBCUA 2   MICROCMT SEE COMMENT         RADIOLOGY STUDIES:  I have personally reviewed the images performed.     CTA head: Atherosclerotic plaquing cavernous and supraclinoid segments of the ICAs with probable moderate narrowing.  In addition there is atherosclerotic disease in the distal V3 and proximal V4 segments of the vertebral arteries bilaterally with concern for additional moderate stenosis  No definite proximal high-grade stenosis or proximal large vessel occlusion.     CTA neck: Significant motion distorted examination.  Prominent atherosclerotic plaquing carotid bifurcations and proximal ICAs with concern for 50% right and approximately 60% left proximal ICA stenosis by NASCET criteria allowing for limitation.  This may be better assessed carotid ultrasonography.     CT head: No evidence for acute intracranial hemorrhage or definite abnormal parenchymal enhancement.  Stable hypodensities within the thalami suggestive for remote lacunar-type infarcts    TTE 1/2/2025    Left Ventricle: The left ventricle is normal in size. Moderate septal thickening. Normal wall motion. There is normal systolic function. Quantitated ejection fraction is 58%. Grade  I diastolic dysfunction.    Right Ventricle: Mild right ventricular enlargement. Systolic function is normal.    Left Atrium: Agitated saline study of the atrial septum is negative after vasalva maneuver, suggesting absence of intracardiac shunt at the atrial level.    Right Atrium: Right atrium is dilated.    Aortic Valve: There is aortic valve sclerosis. Moderately restricted motion. There is no stenosis. Aortic valve peak velocity is 1.3 m/s. Mean gradient is 3.9 mmHg. There is moderate aortic regurgitation with an anteromedial eccentrically directed jet.    Aorta: Aortic root is mildly dilated measuring 4.02 cm. Ascending aorta is normal measuring 3.34 cm.      Assessment:     Patient is an 85 yo male with PMH of CVA with residual LSW, HTN, and CKD who presented to the ED for slurred speech and R sided weakness. CTH with chronic lacunar infarcts. CTA with prominent atherosclerosis at the carotid bifurcation and 50% stenosis of proximal R ICA and 60% stenosis of proximal L ICA. On exam, he has mild RUE weakness. Bilateral lower extremities are diffusely weak.     Impression:  Acute infarct vs TIA (symptoms of RUE weakness and dysarthria have since resolved per his daughter)     Recommendations  - MRI Brain without contrast pending  - Carotid ultrasound with elevated L ICA velocities, 50-69% stenosis consider vascular consult   - Recent echo (11/2024) with diastolic dysfunction and L atrial dilation. Recommend repeat echo with bubble study  - Loop recorder vs. Holter monitor. Atrial dilation on TTE concerning for underlying paroxysmal afib which would increase stroke risk  - DAPT for 21 days followed by aspirin monotherapy thereafter   - LDL 84, continue atorvastatin for LDL goal <70  - TSH, HIV, RPR, and A1C wnl   - B12 goal >400, continue B12 supplementation    Case seen and discussed with Dr. Amelia Barrientos MD  [facility] PGY-4

## 2025-01-03 NOTE — PT/OT/SLP PROGRESS
Physical Therapy Treatment    Patient Name:  Stanley Granados   MRN:  [id number]    Recommendations:     Discharge Recommendations: High Intensity Therapy  Discharge Equipment Recommendations: to be determined by next level of care  Barriers to discharge: Decreased caregiver support    Assessment:     Stanley Granados is a 84 y.o. male admitted with a medical diagnosis of Slurred speech.  He presents with the following impairments/functional limitations: weakness, impaired endurance, impaired self care skills, impaired functional mobility, impaired balance, gait instability, decreased coordination, decreased lower extremity function, decreased upper extremity function, decreased safety awareness, decreased ROM, impaired coordination ;pt with good mobility today, amb'd short distance in room w/ RW and Ria, needing  cueing for safety.    Rehab Prognosis: Good; patient would benefit from acute skilled PT services to address these deficits and reach maximum level of function.    Recent Surgery: * No surgery found *      Plan:     During this hospitalization, patient to be seen 6 x/week to address the identified rehab impairments via gait training, therapeutic activities, therapeutic exercises, neuromuscular re-education and progress toward the following goals:    Plan of Care Expires:  02/01/25    Subjective     Chief Complaint: none stated  Patient/Family Comments/goals: pt agreeable to session, daughter present as well, requesting to return pt to bed following amb.  Pain/Comfort:  Pain Rating 1: 0/10  Pain Rating Post-Intervention 1: 0/10      Objective:     Communicated with nurse prior to session.  Patient found up in chair with peripheral IV, telemetry, Condom Catheter, chair check upon PT entry to room.     General Precautions: Standard, fall  Orthopedic Precautions: N/A  Braces: N/A  Respiratory Status: Room air     Functional Mobility:  Bed Mobility:     Scooting: stand by assistance  Sit to Supine: minimum assistance  and at LE's  Transfers:     Sit to Stand:  minimum assistance with rolling walker  Gait: amb'd 35' w/ RW and min.A, cueing for safety w/ RW and upright posture       AM-PAC 6 CLICK MOBILITY  Turning over in bed (including adjusting bedclothes, sheets and blankets)?: 3  Sitting down on and standing up from a chair with arms (e.g., wheelchair, bedside commode, etc.): 3  Moving from lying on back to sitting on the side of the bed?: 3  Moving to and from a bed to a chair (including a wheelchair)?: 3  Need to walk in hospital room?: 3  Climbing 3-5 steps with a railing?: 2  Basic Mobility Total Score: 17       Treatment & Education:      Patient left HOB elevated with all lines intact, call button in reach, bed alarm on, nurse notified, and daughter present..    GOALS:   Multidisciplinary Problems       Physical Therapy Goals          Problem: Physical Therapy    Goal Priority Disciplines Outcome Interventions   Physical Therapy Goal     PT, PT/OT Progressing    Description: Goals to be met by: 2025     Patient will increase functional independence with mobility by performin. Supine to sit with Supervision/Mod I  2. Sit to supine with Supervision/Mod I  3. Sit to stand transfer with Supervision  4. Gait  x 50 feet with Supervision using Rolling Walker.                          Time Tracking:     PT Received On: 25  PT Start Time: 1305     PT Stop Time: 1323  PT Total Time (min): 18 min     Billable Minutes: Gait Training 18    Treatment Type: Treatment  PT/PTA: PTA     Number of PTA visits since last PT visit: 2025

## 2025-01-03 NOTE — ASSESSMENT & PLAN NOTE
Patient with history of stroke with left sided deficits now with new right-sided weakness and slurred speech.  CT Head:   No convincing acute intracranial abnormalities or evidence of large territory acute infarct.   Suspected remote lacunar infarct in the right thalamus.  Correlation needed.   There is patchy deep white matter low density as seen with microvascular chronic ischemic changes .  This limits detection for nonhemorrhagic acute white matter lacunar type infarcts.  Additional imaging can further evaluate these findings as clinically warranted.   Pan sinus disease with air-fluid levels in the maxillary and sphenoid sinuses.     Plan:  - Neuro checks q4hr.   - [facility] consulted; appreciate recs  - PT/OT/SLP consulted, appreciate recs  - F/U Cardiology recs 2/2 stenosis

## 2025-01-03 NOTE — ASSESSMENT & PLAN NOTE
Patient's blood pressure range in the last 24 hours was: BP  Min: 135/77  Max: 169/75.The patient's inpatient anti-hypertensive regimen is listed below:  Current Antihypertensives  labetaloL injection 10 mg, Every 15 min PRN, Intravenous  amLODIPine tablet 5 mg, Daily, Oral    Plan  - Will monitor, currently on home amlodipine

## 2025-01-03 NOTE — PLAN OF CARE
HA communicated today with Sofia at [facility] -   Pt's MRI - brain to be done later today - the [facility] physician wants to review results but otherwise pt has been accepted to [facility]. Pt can be transferred to facility Saturday 1/04     CM called and spoke with pt's daughter Ashley Lane  -- she agrees with placement Sat at [facility].  W/E CM will call her when pt is ready to transfer.               01/03/25 1633   Post-Acute Status   Post-Acute Authorization Placement   Post-Acute Placement Status Referrals Sent   Discharge Delays   (Pending MRI, Cards Recc)   Discharge Plan   Discharge Plan A Rehab   Discharge Plan B Home;Home with family;Home Health

## 2025-01-03 NOTE — PROGRESS NOTES
[facility] Medicine  Progress Note    Patient Name: Stanley Granados  MRN: [id number]  Patient Class: IP- Inpatient   Admission Date: 12/31/2024  Length of Stay: 3 days  Attending Physician: No att. providers found  Primary Care Provider: Phuong Umaña MD        Subjective     Principal Problem:Slurred speech        HPI:  Mr. Stanley Granados is an 85 y/o male with a PMHx of stroke with residual left-sided weakness, HTN, CKD 3b, and arthritis presents to the ED 12/31/24 with complaints of slurred speech and right-sided weakness. Per chart review, daughter at bedside during initial ED exam reported his last known normal was 4 days ago. He reportedly has a shuffling gait and denies any falls. Upon my assessment, patient with slurred speech limits history taking but he reports that his abdomen hurts. Denies nausea, vomiting, headache, chest pain, SOB, syncope.     In the ED, vitals notable for hypertension (170s/70s), CBC and CMP WNL, Trop negative, Lipid panel WNL, TSH WNL, Flu/Covid negative, CT Head: No convincing acute intracranial abnormalities or evidence of large territory acute infarct. Suspected remote lacunar infarct in the right thalamus.  Correlation needed. There is patchy deep white matter low density as seen with microvascular chronic ischemic changes .  This limits detection for nonhemorrhagic acute white matter lacunar type infarcts.  Additional imaging can further evaluate these findings as clinically warranted. Pan sinus disease with air-fluid levels in the maxillary and sphenoid sinuses. Per ED, patient did not pass bedside swallow. [facility] asked to admit for stroke rule out.     Overview/Hospital Course:  No notes on file    Interval History: Patient doing well this AM, eating, speech is legible. PT/OT recommending high intensity therapy. Will follow up Cardiology and Neuro recs.    Review of Systems   Constitutional:  Negative for chills and fever.   Respiratory:  Negative for  shortness of breath.    Cardiovascular:  Negative for chest pain.   Gastrointestinal:  Negative for abdominal pain.     Objective:     Vital Signs (Most Recent):  Temp: 97.5 °F (36.4 °C) (01/03/25 0813)  Pulse: 62 (01/03/25 0813)  Resp: 18 (01/03/25 0813)  BP: (!) 146/62 (01/03/25 0813)  SpO2: 97 % (01/03/25 0813) Vital Signs (24h Range):  Temp:  [97 °F (36.1 °C)-98.7 °F (37.1 °C)] 97.5 °F (36.4 °C)  Pulse:  [58-78] 62  Resp:  [17-18] 18  SpO2:  [96 %-99 %] 97 %  BP: (135-169)/(60-77) 146/62     Weight: 70.8 kg (156 lb)  Body mass index is 22.38 kg/m².    Intake/Output Summary (Last 24 hours) at 1/3/2025 1041  Last data filed at 1/3/2025 0645  Gross per 24 hour   Intake --   Output 1100 ml   Net -1100 ml         Physical Exam  Constitutional:       General: He is not in acute distress.     Appearance: He is not toxic-appearing.   HENT:      Head: Normocephalic and atraumatic.      Nose: Nose normal.   Cardiovascular:      Pulses: Normal pulses.      Heart sounds: Normal heart sounds.   Pulmonary:      Effort: Pulmonary effort is normal.      Breath sounds: Normal breath sounds.   Skin:     General: Skin is warm and dry.   Neurological:      Mental Status: He is alert.   Psychiatric:         Mood and Affect: Mood normal.             Significant Labs: All pertinent labs within the past 24 hours have been reviewed.  CBC:   Recent Labs   Lab 01/01/25  1319 01/02/25 0212 01/03/25 0210   WBC 4.39  4.39 4.46  4.46 5.92   HGB 9.3*  9.3* 9.9*  9.9* 10.4*   HCT 27.9*  27.9* 30.8*  30.8* 31.4*   *  142* 131*  131* 161     CMP:   Recent Labs   Lab 01/01/25  1319 01/02/25  0212 01/03/25 0210    137 139   K 4.2 4.4 4.0    107 107   CO2 21* 18* 21*   GLU 86 69* 79   BUN 16 18 20   CREATININE 1.1 1.2 1.2   CALCIUM 8.8 8.9 8.9   PROT 6.7 6.9 7.0   ALBUMIN 3.3* 3.3* 3.4*   BILITOT 0.8 0.6 0.7   ALKPHOS 69 71 70   AST 14 18 14   ALT 12 14 13   ANIONGAP 10 12 11       Significant Imaging: I have reviewed  all pertinent imaging results/findings within the past 24 hours.    Assessment and Plan     * Slurred speech  Patient with history of stroke with left sided deficits now with new right-sided weakness and slurred speech.  CT Head:   No convincing acute intracranial abnormalities or evidence of large territory acute infarct.   Suspected remote lacunar infarct in the right thalamus.  Correlation needed.   There is patchy deep white matter low density as seen with microvascular chronic ischemic changes .  This limits detection for nonhemorrhagic acute white matter lacunar type infarcts.  Additional imaging can further evaluate these findings as clinically warranted.   Pan sinus disease with air-fluid levels in the maxillary and sphenoid sinuses.     Plan:  - Neuro checks q4hr.   - [facility] consulted; appreciate recs  - PT/OT/SLP consulted, appreciate recs  - F/U Cardiology recs 2/2 stenosis    Bilateral carotid artery stenosis  - Will follow up cardiology recommendations      Abdominal pain  Patient reporting abdominal pain. Mild TTP over umbilicus. Patient VSS. Denies nausea and vomiting.    Plan:  - Continue to monitor        Hypertension  Patient's blood pressure range in the last 24 hours was: BP  Min: 135/77  Max: 169/75.The patient's inpatient anti-hypertensive regimen is listed below:  Current Antihypertensives  labetaloL injection 10 mg, Every 15 min PRN, Intravenous  amLODIPine tablet 5 mg, Daily, Oral    Plan  - Will monitor, currently on home amlodipine    Stage 3b chronic kidney disease  Creatine stable for now. BMP reviewed- noted Estimated Creatinine Clearance: 47.5 mL/min (based on SCr of 1.2 mg/dL). according to latest data. Based on current GFR, CKD stage is stage 3 - GFR 30-59.  Monitor UOP and serial BMP and adjust therapy as needed. Renally dose meds. Avoid nephrotoxic medications and procedures.    Plan:  - Continue to monitor    Osteoarthritis of both knees  Patient with history of OA of both  knees complaining of pain in the right knee.   XR Right knee: chronic findings stable from prior    Plan:  - Will manage pain as appropriate      VTE Risk Mitigation (From admission, onward)           Ordered     enoxaparin injection 40 mg  Daily         12/31/24 2258     IP VTE HIGH RISK PATIENT  Once         12/31/24 2258     Place sequential compression device  Until discontinued         12/31/24 2258                    Discharge Planning   VERONICA: 1/3/2025     Code Status: Full Code   Medical Readiness for Discharge Date:   Discharge Plan A: Rehab                Please place Justification for DME        Kali Cummings MD  Department of Hospital Medicine   [place] [facility]

## 2025-01-04 VITALS
SYSTOLIC BLOOD PRESSURE: 118 MMHG | BODY MASS INDEX: 22.33 KG/M2 | RESPIRATION RATE: 18 BRPM | OXYGEN SATURATION: 95 % | DIASTOLIC BLOOD PRESSURE: 53 MMHG | WEIGHT: 156 LBS | TEMPERATURE: 98 F | HEART RATE: 42 BPM | HEIGHT: 70 IN

## 2025-01-04 PROBLEM — I63.81 CEREBROVASCULAR ACCIDENT (CVA) DUE TO STENOSIS OF SMALL ARTERY: Status: ACTIVE | Noted: 2025-01-04

## 2025-01-04 PROBLEM — R29.818 ACUTE FOCAL NEUROLOGICAL DEFICIT: Status: ACTIVE | Noted: 2025-01-04

## 2025-01-04 PROBLEM — R41.82 ALTERED MENTAL STATUS: Status: ACTIVE | Noted: 2025-01-04

## 2025-01-04 LAB
ALBUMIN SERPL BCP-MCNC: 3.3 G/DL (ref 3.5–5.2)
ALP SERPL-CCNC: 74 U/L (ref 40–150)
ALT SERPL W/O P-5'-P-CCNC: 13 U/L (ref 10–44)
ANION GAP SERPL CALC-SCNC: 11 MMOL/L (ref 8–16)
AST SERPL-CCNC: 15 U/L (ref 10–40)
BASOPHILS # BLD AUTO: 0.02 K/UL (ref 0–0.2)
BASOPHILS NFR BLD: 0.3 % (ref 0–1.9)
BILIRUB SERPL-MCNC: 0.5 MG/DL (ref 0.1–1)
BUN SERPL-MCNC: 31 MG/DL (ref 8–23)
CALCIUM SERPL-MCNC: 8.9 MG/DL (ref 8.7–10.5)
CHLORIDE SERPL-SCNC: 106 MMOL/L (ref 95–110)
CO2 SERPL-SCNC: 21 MMOL/L (ref 23–29)
CREAT SERPL-MCNC: 1.4 MG/DL (ref 0.5–1.4)
DIFFERENTIAL METHOD BLD: ABNORMAL
EOSINOPHIL # BLD AUTO: 0.3 K/UL (ref 0–0.5)
EOSINOPHIL NFR BLD: 3.8 % (ref 0–8)
ERYTHROCYTE [DISTWIDTH] IN BLOOD BY AUTOMATED COUNT: 16 % (ref 11.5–14.5)
EST. GFR  (NO RACE VARIABLE): 50 ML/MIN/1.73 M^2
GLUCOSE SERPL-MCNC: 74 MG/DL (ref 70–110)
HCT VFR BLD AUTO: 30.9 % (ref 40–54)
HGB BLD-MCNC: 10.2 G/DL (ref 14–18)
IMM GRANULOCYTES # BLD AUTO: 0.01 K/UL (ref 0–0.04)
IMM GRANULOCYTES NFR BLD AUTO: 0.2 % (ref 0–0.5)
LYMPHOCYTES # BLD AUTO: 2.4 K/UL (ref 1–4.8)
LYMPHOCYTES NFR BLD: 36.9 % (ref 18–48)
MAGNESIUM SERPL-MCNC: 2 MG/DL (ref 1.6–2.6)
MCH RBC QN AUTO: 27.7 PG (ref 27–31)
MCHC RBC AUTO-ENTMCNC: 33 G/DL (ref 32–36)
MCV RBC AUTO: 84 FL (ref 82–98)
MONOCYTES # BLD AUTO: 0.7 K/UL (ref 0.3–1)
MONOCYTES NFR BLD: 10.6 % (ref 4–15)
NEUTROPHILS # BLD AUTO: 3.2 K/UL (ref 1.8–7.7)
NEUTROPHILS NFR BLD: 48.2 % (ref 38–73)
NRBC BLD-RTO: 0 /100 WBC
PHOSPHATE SERPL-MCNC: 4.6 MG/DL (ref 2.7–4.5)
PLATELET # BLD AUTO: 148 K/UL (ref 150–450)
PMV BLD AUTO: 12.8 FL (ref 9.2–12.9)
POCT GLUCOSE: 101 MG/DL (ref 70–110)
POCT GLUCOSE: 117 MG/DL (ref 70–110)
POCT GLUCOSE: 75 MG/DL (ref 70–110)
POTASSIUM SERPL-SCNC: 4.1 MMOL/L (ref 3.5–5.1)
PROT SERPL-MCNC: 6.9 G/DL (ref 6–8.4)
RBC # BLD AUTO: 3.68 M/UL (ref 4.6–6.2)
SODIUM SERPL-SCNC: 138 MMOL/L (ref 136–145)
WBC # BLD AUTO: 6.59 K/UL (ref 3.9–12.7)

## 2025-01-04 PROCEDURE — 83735 ASSAY OF MAGNESIUM: CPT

## 2025-01-04 PROCEDURE — 85025 COMPLETE CBC W/AUTO DIFF WBC: CPT

## 2025-01-04 PROCEDURE — A9585 GADOBUTROL INJECTION: HCPCS | Performed by: HOSPITALIST

## 2025-01-04 PROCEDURE — 25000003 PHARM REV CODE 250

## 2025-01-04 PROCEDURE — 84100 ASSAY OF PHOSPHORUS: CPT

## 2025-01-04 PROCEDURE — 25500020 PHARM REV CODE 255: Performed by: HOSPITALIST

## 2025-01-04 PROCEDURE — 97116 GAIT TRAINING THERAPY: CPT | Mod: CQ

## 2025-01-04 PROCEDURE — 94761 N-INVAS EAR/PLS OXIMETRY MLT: CPT

## 2025-01-04 PROCEDURE — 99900035 HC TECH TIME PER 15 MIN (STAT)

## 2025-01-04 PROCEDURE — 36415 COLL VENOUS BLD VENIPUNCTURE: CPT

## 2025-01-04 PROCEDURE — 80053 COMPREHEN METABOLIC PANEL: CPT

## 2025-01-04 PROCEDURE — 97530 THERAPEUTIC ACTIVITIES: CPT | Mod: CQ

## 2025-01-04 RX ORDER — CLOPIDOGREL BISULFATE 75 MG/1
75 TABLET ORAL DAILY
Qty: 18 TABLET | Refills: 0 | Status: SHIPPED | OUTPATIENT
Start: 2025-01-05 | End: 2025-01-26

## 2025-01-04 RX ORDER — NAPROXEN SODIUM 220 MG/1
81 TABLET, FILM COATED ORAL DAILY
Start: 2025-01-05 | End: 2026-01-05

## 2025-01-04 RX ORDER — LANOLIN ALCOHOL/MO/W.PET/CERES
1000 CREAM (GRAM) TOPICAL DAILY
Start: 2025-01-05

## 2025-01-04 RX ORDER — GADOBUTROL 604.72 MG/ML
7 INJECTION INTRAVENOUS
Status: COMPLETED | OUTPATIENT
Start: 2025-01-04 | End: 2025-01-04

## 2025-01-04 RX ORDER — LORAZEPAM 0.5 MG/1
0.5 TABLET ORAL ONCE AS NEEDED
Status: COMPLETED | OUTPATIENT
Start: 2025-01-04 | End: 2025-01-04

## 2025-01-04 RX ORDER — ATORVASTATIN CALCIUM 80 MG/1
80 TABLET, FILM COATED ORAL DAILY
Start: 2025-01-05 | End: 2026-01-05

## 2025-01-04 RX ADMIN — AMLODIPINE BESYLATE 5 MG: 5 TABLET ORAL at 08:01

## 2025-01-04 RX ADMIN — ASPIRIN 81 MG CHEWABLE TABLET 81 MG: 81 TABLET CHEWABLE at 08:01

## 2025-01-04 RX ADMIN — CLOPIDOGREL BISULFATE 75 MG: 75 TABLET ORAL at 08:01

## 2025-01-04 RX ADMIN — LORAZEPAM 0.5 MG: 0.5 TABLET ORAL at 12:01

## 2025-01-04 RX ADMIN — ATORVASTATIN CALCIUM 40 MG: 40 TABLET, FILM COATED ORAL at 08:01

## 2025-01-04 RX ADMIN — CYANOCOBALAMIN TAB 1000 MCG 1000 MCG: 1000 TAB at 08:01

## 2025-01-04 RX ADMIN — GADOBUTROL 7 ML: 604.72 INJECTION INTRAVENOUS at 01:01

## 2025-01-04 NOTE — PT/OT/SLP PROGRESS
Physical Therapy Treatment    Patient Name:  Stanley Granados   MRN:  [id number]    Recommendations:     Discharge Recommendations: High Intensity Therapy  Discharge Equipment Recommendations: to be determined by next level of care  Barriers to discharge:  Increased assistance needed    Assessment:     Stanley Granados is a 84 y.o. male admitted with a medical diagnosis of Slurred speech.  He presents with the following impairments/functional limitations: weakness, impaired endurance, impaired self care skills, impaired functional mobility, gait instability, impaired balance, decreased coordination, decreased upper extremity function, decreased lower extremity function, decreased ROM, impaired coordination, impaired joint extensibility. Pt able to perform 4 ambulation training trials ~15 ft, ~25 ft, ~25-30 ft, and ~50-55 ft all with RW requiring min/CGA. Would benefit from continued PT services to increase pt's out of bed therapeutic activities and exercises.    Rehab Prognosis: Good; patient would benefit from acute skilled PT services to address these deficits and reach maximum level of function.    Recent Surgery: * No surgery found *      Plan:     During this hospitalization, patient to be seen 6 x/week to address the identified rehab impairments via gait training, therapeutic activities, therapeutic exercises, neuromuscular re-education and progress toward the following goals:    Plan of Care Expires:  02/01/25    Subjective     Chief Complaint: None Expressed  Patient/Family Comments/goals: None Expressed  Pain/Comfort:  Pain Rating 1: 0/10  Pain Rating Post-Intervention 1: 0/10 (No pain complaints)      Objective:     Communicated with nurse prior to session.  Patient found HOB elevated with bed alarm, Condom Catheter, telemetry upon PT entry to room.     General Precautions: Standard, fall  Orthopedic Precautions: N/A  Braces: N/A  Respiratory Status: Room air     Functional Mobility:  Bed Mobility:     Rolling  Left:  stand by assistance  Rolling Right: stand by assistance  Scooting: stand by assistance and to EOB with extra time to complete  Supine to Sit: minimum assistance  Sit to Supine: minimum assistance  Transfers:     Sit to Stand:  contact guard assistance and minimum assistance with rolling walker  Gait: 4 trials ~15 ft, ~25 ft, ~25-30 ft, and ~50-55 ft  all with RW requiring min/CGA      AM-PAC 6 CLICK MOBILITY  Turning over in bed (including adjusting bedclothes, sheets and blankets)?: 3  Sitting down on and standing up from a chair with arms (e.g., wheelchair, bedside commode, etc.): 3  Moving from lying on back to sitting on the side of the bed?: 3  Moving to and from a bed to a chair (including a wheelchair)?: 3  Need to walk in hospital room?: 3  Climbing 3-5 steps with a railing?: 2  Basic Mobility Total Score: 17       Treatment & Education:  Pt able to perform 4 ambulation training trials as described above. Demonstrates and increased trunk flexion with increased bilateral knee flexion throughout. Tactile cueing to improve posture slightly successful however pt cannot hold position. Occasional verbal cueing to stay within RW boundaries needed. Requires seated rest breaks between all ambulation trials. Ambulated around bed to sink secondary to pt requesting to be able to rinse mouth. Stood and sink to rinse mouth requiring CGA/close SBA.     Patient left HOB elevated with all lines intact, call button in reach, bed alarm on, and nurse notified..    GOALS:   Multidisciplinary Problems       Physical Therapy Goals          Problem: Physical Therapy    Goal Priority Disciplines Outcome Interventions   Physical Therapy Goal     PT, PT/OT Progressing    Description: Goals to be met by: 2025     Patient will increase functional independence with mobility by performin. Supine to sit with Supervision/Mod I  2. Sit to supine with Supervision/Mod I  3. Sit to stand transfer with Supervision  4. Gait  x  50 feet with Supervision using Rolling Walker.                          Time Tracking:     PT Received On: 01/04/25  PT Start Time: 1123     PT Stop Time: 1201  PT Total Time (min): 38 min     Billable Minutes: Gait Training 23 and Therapeutic Activity 15    Treatment Type: Treatment  PT/PTA: PTA     Number of PTA visits since last PT visit: 2     01/04/2025

## 2025-01-04 NOTE — CARE UPDATE
[facility] [place]  [address] [place]  [address]  [phone]           FACILITY TRANSFER ORDERS           Admit to:    IPR                                                Diagnoses:   Active Hospital Problems    Diagnosis  POA    *Slurred speech [R47.81]  Yes    Altered mental status [R41.82]  Yes    Acute focal neurological deficit [R29.818]  Yes    Cerebrovascular accident (CVA) due to stenosis of small artery [I63.81]  Unknown    Bilateral carotid artery stenosis [I65.23]  Yes    Abdominal pain [R10.9]  Yes    Osteoarthritis of both knees [M17.0]  Yes    Hypertension [I10]  Yes    Stage 3b chronic kidney disease [N18.32]  Yes      Resolved Hospital Problems    Diagnosis Date Resolved POA    Acute encephalopathy [G93.40] 12/31/2024 Yes        Goals of Care Treatment Preferences:  Code Status: Full Code         Allergies:  Review of patient's allergies indicates:   Allergen Reactions    Nsaids (non-steroidal anti-inflammatory drug)        Vitals:       Per facility protocol (LTACH/IPR only)     Diet: Soft and bite sized; thin standard tray   Activities:     - As  tolerated           LABS:  Per facility protocol       Nursing Precautions:    - Aspiration precautions:               - Total assistance with meals              -  Upright 90 degrees before during and after meals               -  Suction at bedside          - Fall precautions per nursing home protocol   - Seizure precaution per assisted protocol   - Decubitus precautions:              -  for positioning              - Pressure reducing foam mattress              - Turn patient every two hours. Use wedge pillows to anchor patient     CONSULTS:                 Physical Therapy to evaluate and treat                 Occupational Therapy to evaluate and treat                 Speech Therapy   to evaluate and treat                 Nutrition to evaluate and recommend diet                                             Medications:  Discontinue all previous medication orders, if any. See new list below.     Medication List        START taking these medications      aspirin 81 MG Chew  Take 1 tablet (81 mg total) by mouth once daily.  Start taking on: January 5, 2025     clopidogreL 75 mg tablet  Commonly known as: PLAVIX  Take 1 tablet (75 mg total) by mouth once daily. for 18 days  Start taking on: January 5, 2025     cyanocobalamin 1000 MCG tablet  Commonly known as: VITAMIN B-12  Take 1 tablet (1,000 mcg total) by mouth once daily.  Start taking on: January 5, 2025            CHANGE how you take these medications      atorvastatin 80 MG tablet  Commonly known as: LIPITOR  Take 1 tablet (80 mg total) by mouth once daily.  Start taking on: January 5, 2025  What changed:   medication strength  how much to take            CONTINUE taking these medications      amLODIPine 5 MG tablet  Commonly known as: NORVASC  Take 1 tablet (5 mg total) by mouth once daily.     carvediloL 25 MG tablet  Commonly known as: COREG  Take 1 tablet (25 mg total) by mouth 2 (two) times daily.     ferrous gluconate 324 MG tablet  Commonly known as: FERGON  Take 1 tablet (324 mg total) by mouth every morning.     magnesium oxide 400 mg (241.3 mg magnesium) tablet  Commonly known as: MAG-OX  Take 1 tablet (400 mg total) by mouth once daily.     nitroGLYCERIN 0.4 MG SL tablet  Commonly known as: NITROSTAT  Place 1 tablet (0.4 mg total) under the tongue every 5 (five) minutes as needed for Chest pain.            STOP taking these medications      fexofenadine 180 MG tablet  Commonly known as: ALLEGRA     potassium chloride 8 MEQ Tbsr  Commonly known as: KLOR-CON     ranolazine 1,000 mg Tb12  Commonly known as: RANEXA     sodium bicarbonate 650 MG tablet               SABI Parra  [facility]

## 2025-01-04 NOTE — PROGRESS NOTES
AVS prepared by FRANCIE. AVS placed in transportation packet for Inpatient Rehab,by beside nurse Mitesh.       01/04/25 1557   Nurse Notification   Charge Nurse Notified? Yes   Name of Charge Nurse lars   Bedside Nurse Notified? Yes   Name of Bedside Nurse Mitesh   Nurse Notfication Method Secure Chat   Nurse Notified Of Orders    Notification    Notified? Yes   Name of  Mala quijano    Notification Method Secure Chat    Notified Of Discharge Status

## 2025-01-04 NOTE — HOSPITAL COURSE
Mr. Stanley Granados is an 85 y/o male with a PMHx of stroke with residual left-sided weakness, HTN, CKD 3b, and arthritis presents to the ED 12/31/24 with complaints of slurred speech and right-sided weakness. Per chart review, daughter at bedside during initial ED exam reported his last known normal was 4 days prior to admission. CT Head: No convincing acute intracranial abnormalities or evidence of large territory acute infarct. Suspected remote lacunar infarct in the right thalamus.  In ED patient unable to pass bedside swallow test.  [facility]  [facility]/24 for stroke rule out.   Neurology consulted.  CTA head and neck   showed atherosclerotic disease and bilateral proximal ICA stenosis.  patient started on DAPT: aspirin and Plavix.  Cardiology consulted high intensity statin initiated. TTE with bubble showed no evidence of intracardiac  shunt.  Speech language pathology consulted and recommended soft foods with small  bites for patient diet.  PT/OT consulted and recommended intensive inpatient rehabilitation. MRI brain demonstrated a small acute infarction of left posterior limb internal capsule.Additionally, cerebral volume loss with scattered remote infarcts found on study.  Patient discharged in hemodynamically stable condition to  inpatient rehabilitation facility.  Patient provided with return precautions    Physical Exam  Constitutional:       General: He is not in acute distress.     Appearance: Normal appearance.   HENT:      Head: Normocephalic and atraumatic.      Mouth/Throat:      Comments: Poor dentition  Cardiovascular:      Rate and Rhythm: Normal rate.   Pulmonary:      Effort: Pulmonary effort is normal. No respiratory distress.   Neurological:      Mental Status: He is alert.      Comments: Makes eye contact, smiles, answers questions appropriately. Speech mildly slurred, but understandable   Psychiatric:         Mood and Affect: Mood normal.         Behavior:  Behavior normal.

## 2025-01-04 NOTE — SUBJECTIVE & OBJECTIVE
Interval History: NAEON.    Review of Systems   Neurological:  Positive for speech difficulty.     Objective:     Vital Signs (Most Recent):  Temp: 98.1 °F (36.7 °C) (01/04/25 0422)  Pulse: 61 (01/04/25 0422)  Resp: 20 (01/04/25 0422)  BP: (!) 141/63 (01/04/25 0422)  SpO2: 95 % (01/04/25 0422) Vital Signs (24h Range):  Temp:  [97.5 °F (36.4 °C)-98.4 °F (36.9 °C)] 98.1 °F (36.7 °C)  Pulse:  [53-87] 61  Resp:  [18-20] 20  SpO2:  [93 %-98 %] 95 %  BP: (120-149)/(49-70) 141/63     Weight: 70.8 kg (156 lb)  Body mass index is 22.38 kg/m².    Intake/Output Summary (Last 24 hours) at 1/4/2025 0803  Last data filed at 1/4/2025 0608  Gross per 24 hour   Intake --   Output 2250 ml   Net -2250 ml         Physical Exam  Constitutional:       General: He is not in acute distress.     Appearance: Normal appearance.   HENT:      Head: Normocephalic and atraumatic.      Mouth/Throat:      Comments: Poor dentition  Cardiovascular:      Rate and Rhythm: Normal rate.   Pulmonary:      Effort: Pulmonary effort is normal. No respiratory distress.   Neurological:      Mental Status: He is alert.      Comments: Makes eye contact, smiles, answers questions appropriately. Speech mildly slurred, but understandable   Psychiatric:         Mood and Affect: Mood normal.         Behavior: Behavior normal.             Significant Labs: All pertinent labs within the past 24 hours have been reviewed.  CBC:   Recent Labs   Lab 01/03/25 0210 01/04/25 0326   WBC 5.92 6.59   HGB 10.4* 10.2*   HCT 31.4* 30.9*    148*     CMP:   Recent Labs   Lab 01/03/25 0210 01/04/25 0326    138   K 4.0 4.1    106   CO2 21* 21*   GLU 79 74   BUN 20 31*   CREATININE 1.2 1.4   CALCIUM 8.9 8.9   PROT 7.0 6.9   ALBUMIN 3.4* 3.3*   BILITOT 0.7 0.5   ALKPHOS 70 74   AST 14 15   ALT 13 13   ANIONGAP 11 11       Significant Imaging: I have reviewed all pertinent imaging results/findings within the past 24 hours.

## 2025-01-04 NOTE — ASSESSMENT & PLAN NOTE
Creatine stable for now. BMP reviewed- noted Estimated Creatinine Clearance: 40.7 mL/min (based on SCr of 1.4 mg/dL). according to latest data. Based on current GFR, CKD stage is stage 3 - GFR 30-59.  Monitor UOP and serial BMP and adjust therapy as needed. Renally dose meds. Avoid nephrotoxic medications and procedures.    Plan:  - Continue to monitor

## 2025-01-04 NOTE — PROGRESS NOTES
[facility] Medicine  Progress Note    Patient Name: Stanley Granados  MRN: [id number]  Patient Class: IP- Inpatient   Admission Date: 12/31/2024  Length of Stay: 4 days  Attending Physician: Mikhail Hook MD  Primary Care Provider: Phuong Umaña MD        Subjective     Principal Problem:Slurred speech        HPI:  Mr. Stanley Granados is an 83 y/o male with a PMHx of stroke with residual left-sided weakness, HTN, CKD 3b, and arthritis presents to the ED 12/31/24 with complaints of slurred speech and right-sided weakness. Per chart review, daughter at bedside during initial ED exam reported his last known normal was 4 days ago. He reportedly has a shuffling gait and denies any falls. Upon my assessment, patient with slurred speech limits history taking but he reports that his abdomen hurts. Denies nausea, vomiting, headache, chest pain, SOB, syncope.     In the ED, vitals notable for hypertension (170s/70s), CBC and CMP WNL, Trop negative, Lipid panel WNL, TSH WNL, Flu/Covid negative, CT Head: No convincing acute intracranial abnormalities or evidence of large territory acute infarct. Suspected remote lacunar infarct in the right thalamus.  Correlation needed. There is patchy deep white matter low density as seen with microvascular chronic ischemic changes .  This limits detection for nonhemorrhagic acute white matter lacunar type infarcts.  Additional imaging can further evaluate these findings as clinically warranted. Pan sinus disease with air-fluid levels in the maxillary and sphenoid sinuses. Per ED, patient did not pass bedside swallow. [facility] asked to admit for stroke rule out.     Overview/Hospital Course:  No notes on file    Interval History: NAEON.    Review of Systems   Neurological:  Positive for speech difficulty.     Objective:     Vital Signs (Most Recent):  Temp: 98.1 °F (36.7 °C) (01/04/25 0422)  Pulse: 61 (01/04/25 0422)  Resp: 20 (01/04/25 0422)  BP: (!) 141/63 (01/04/25  0422)  SpO2: 95 % (01/04/25 0422) Vital Signs (24h Range):  Temp:  [97.5 °F (36.4 °C)-98.4 °F (36.9 °C)] 98.1 °F (36.7 °C)  Pulse:  [53-87] 61  Resp:  [18-20] 20  SpO2:  [93 %-98 %] 95 %  BP: (120-149)/(49-70) 141/63     Weight: 70.8 kg (156 lb)  Body mass index is 22.38 kg/m².    Intake/Output Summary (Last 24 hours) at 1/4/2025 0803  Last data filed at 1/4/2025 0608  Gross per 24 hour   Intake --   Output 2250 ml   Net -2250 ml         Physical Exam  Constitutional:       General: He is not in acute distress.     Appearance: Normal appearance.   HENT:      Head: Normocephalic and atraumatic.      Mouth/Throat:      Comments: Poor dentition  Cardiovascular:      Rate and Rhythm: Normal rate.   Pulmonary:      Effort: Pulmonary effort is normal. No respiratory distress.   Neurological:      Mental Status: He is alert.      Comments: Makes eye contact, smiles, answers questions appropriately. Speech mildly slurred, but understandable   Psychiatric:         Mood and Affect: Mood normal.         Behavior: Behavior normal.             Significant Labs: All pertinent labs within the past 24 hours have been reviewed.  CBC:   Recent Labs   Lab 01/03/25  0210 01/04/25  0326   WBC 5.92 6.59   HGB 10.4* 10.2*   HCT 31.4* 30.9*    148*     CMP:   Recent Labs   Lab 01/03/25  0210 01/04/25  0326    138   K 4.0 4.1    106   CO2 21* 21*   GLU 79 74   BUN 20 31*   CREATININE 1.2 1.4   CALCIUM 8.9 8.9   PROT 7.0 6.9   ALBUMIN 3.4* 3.3*   BILITOT 0.7 0.5   ALKPHOS 70 74   AST 14 15   ALT 13 13   ANIONGAP 11 11       Significant Imaging: I have reviewed all pertinent imaging results/findings within the past 24 hours.    Assessment and Plan     * Slurred speech  Patient with history of stroke with left sided deficits now with new right-sided weakness and slurred speech.  CT Head:   No convincing acute intracranial abnormalities or evidence of large territory acute infarct.   Suspected remote lacunar infarct in the  right thalamus.  Correlation needed.   There is patchy deep white matter low density as seen with microvascular chronic ischemic changes .  This limits detection for nonhemorrhagic acute white matter lacunar type infarcts.  Additional imaging can further evaluate these findings as clinically warranted.   Pan sinus disease with air-fluid levels in the maxillary and sphenoid sinuses.     Plan:  - Neuro checks q4hr.   - [facility] consulted; appreciate recs  - PT/OT/SLP consulted, appreciate recs  - F/U Cardiology recs 2/2 stenosis    Bilateral carotid artery stenosis  - Will follow up cardiology recommendations      Abdominal pain  Patient reporting abdominal pain. Mild TTP over umbilicus. Patient VSS. Denies nausea and vomiting.    Plan:  - Continue to monitor        Hypertension  Patient's blood pressure range in the last 24 hours was: BP  Min: 120/49  Max: 149/61.The patient's inpatient anti-hypertensive regimen is listed below:  Current Antihypertensives  labetaloL injection 10 mg, Every 15 min PRN, Intravenous  amLODIPine tablet 5 mg, Daily, Oral    Plan  - Will monitor, currently on home amlodipine    Stage 3b chronic kidney disease  Creatine stable for now. BMP reviewed- noted Estimated Creatinine Clearance: 40.7 mL/min (based on SCr of 1.4 mg/dL). according to latest data. Based on current GFR, CKD stage is stage 3 - GFR 30-59.  Monitor UOP and serial BMP and adjust therapy as needed. Renally dose meds. Avoid nephrotoxic medications and procedures.    Plan:  - Continue to monitor    Osteoarthritis of both knees  Patient with history of OA of both knees complaining of pain in the right knee.   XR Right knee: chronic findings stable from prior    Plan:  - Will manage pain as appropriate      VTE Risk Mitigation (From admission, onward)           Ordered     enoxaparin injection 40 mg  Daily         12/31/24 2258     IP VTE HIGH RISK PATIENT  Once         12/31/24 2258     Place sequential compression device   Until discontinued         12/31/24 2258                    Discharge Planning   VERONICA: 1/3/2025     Code Status: Full Code   Medical Readiness for Discharge Date:   Discharge Plan A: Rehab   Discharge Delays:  (Pending MRI, Cards Recc)            Please place Justification for DME        Lili Mata MD  Department of [facility] Medicine   [facility]

## 2025-01-04 NOTE — PLAN OF CARE
Will re-evaluate patient once the following imaging studies are complete:    MRI brain wo contrast    Recommendations unchanged:    Impression:  Acute infarct vs TIA (symptoms of RUE weakness and dysarthria have since resolved per his daughter)      Recommendations  - MRI Brain without contrast pending  - Carotid ultrasound with elevated L ICA velocities, 50-69% stenosis consider vascular consult   - TTE negative for shunt, normal EF, R atrial enlargement  - Loop recorder vs. Holter monitor. Atrial dilation on TTE concerning for underlying paroxysmal afib which would increase stroke risk  - DAPT for 21 days followed by aspirin monotherapy thereafter   - LDL 84, continue atorvastatin for LDL goal <70  - TSH, HIV, RPR, and A1C wnl   - B12 goal >400, continue B12 supplementation    Negra Barrientos MD  [facility] PGY-4

## 2025-01-04 NOTE — ASSESSMENT & PLAN NOTE
Patient's blood pressure range in the last 24 hours was: BP  Min: 120/49  Max: 149/61.The patient's inpatient anti-hypertensive regimen is listed below:  Current Antihypertensives  labetaloL injection 10 mg, Every 15 min PRN, Intravenous  amLODIPine tablet 5 mg, Daily, Oral    Plan  - Will monitor, currently on home amlodipine

## 2025-01-04 NOTE — PLAN OF CARE
[facility] - Telemetry  Discharge Final Note    Primary Care Provider: Phuong Umaña MD    Expected Discharge Date: 1/4/2025    Final Discharge Note (most recent)       Final Note - 01/04/25 1610          Final Note    Assessment Type Final Discharge Note (P)      Anticipated Discharge Disposition Rehab Facility (P)    [facility]    Hospital Resources/Appts/Education Provided Appointments scheduled and added to AVS (P)         Post-Acute Status    Post-Acute Authorization Placement (P)      Post-Acute Placement Status Set-up Complete/Auth obtained (P)    [facility]    Discharge Delays PFC Arranged Transportation (P)

## 2025-01-04 NOTE — PLAN OF CARE
"0800  CM was informed by HA Sevilla that the pt has been accepted to [facility] pending MRI and neuro & cards clearance.     0915  Message received from Garth shabazz/[facility] questioning the pt's discharge status. Message sent to Dr Hook informing of above. Awaiting response.     1000  CM was informed by Dr Hook that the pt will be medically stable to discharge to [facility] after the MRI is completed today.     1050  Message sent to Garth informing of above. HA was informed by the MRI dept that the test will be done at 1500.     1425  MRI completed. CM was informed by Garth that he pt will be accepted for admission today & requested "facility transfer" orders. Message sent to Dr Hook informing of above.     1500  "Facility Transfer" orders noted. Patient resting quietly in bed with his daughter, Ashley Lane ([phone]), at the bedside when CM rounded. Patient in agreement with plan to discharge to [facility] today, will need assistance with  van transportation at time of discharge.     Scheduled hospfu appts with Dr Kandy Schwartz (ortho) on 1/7/2025 at 1330 & Dr Phuong Umaña (PCP) on 1/13/2025 at 1040 noted.     Transportation packet left at the nurse's station & signed "Pt Choice" form left in the pt's chart.     1545  Message sent to nurse Sagastume, charge nurse Eulalia, & virtual nurse Sejal informing that the pt is cleared to discharge, of transportation scheduled, & requesting that Mitesh call report.       Will continue to follow.   "

## 2025-01-05 NOTE — DISCHARGE SUMMARY
[facility] Medicine  Discharge Summary      Patient Name: Stanley Granados  MRN: [id number]  LAURYN: [id number]  Patient Class: IP- Inpatient  Admission Date: 12/31/2024  Hospital Length of Stay: 4 days  Discharge Date and Time: 1/4/2025  7:15 PM  Attending Physician: No att. providers found   Discharging Provider: Blade Parra MD  Primary Care Provider: Phuong Umaña MD    Primary Care Team: Networked reference to record PCT     HPI:   Mr. Stanley Granados is an 85 y/o male with a PMHx of stroke with residual left-sided weakness, HTN, CKD 3b, and arthritis presents to the ED 12/31/24 with complaints of slurred speech and right-sided weakness. Per chart review, daughter at bedside during initial ED exam reported his last known normal was 4 days ago. He reportedly has a shuffling gait and denies any falls. Upon my assessment, patient with slurred speech limits history taking but he reports that his abdomen hurts. Denies nausea, vomiting, headache, chest pain, SOB, syncope.     In the ED, vitals notable for hypertension (170s/70s), CBC and CMP WNL, Trop negative, Lipid panel WNL, TSH WNL, Flu/Covid negative, CT Head: No convincing acute intracranial abnormalities or evidence of large territory acute infarct. Suspected remote lacunar infarct in the right thalamus.  Correlation needed. There is patchy deep white matter low density as seen with microvascular chronic ischemic changes .  This limits detection for nonhemorrhagic acute white matter lacunar type infarcts.  Additional imaging can further evaluate these findings as clinically warranted. Pan sinus disease with air-fluid levels in the maxillary and sphenoid sinuses. Per ED, patient did not pass bedside swallow. [facility] asked to admit for stroke rule out.     * No surgery found *      Hospital Course:   Mr. Stanley Granados is an 85 y/o male with a PMHx of stroke with residual left-sided weakness, HTN, CKD 3b, and arthritis presents to the ED  12/31/24 with complaints of slurred speech and right-sided weakness. Per chart review, daughter at bedside during initial ED exam reported his last known normal was 4 days prior to admission. CT Head: No convincing acute intracranial abnormalities or evidence of large territory acute infarct. Suspected remote lacunar infarct in the right thalamus.  In ED patient unable to pass bedside swallow test.  [facility]  12/[facility]/24 for stroke rule out.   Neurology consulted.  CTA head and neck   showed atherosclerotic disease and bilateral proximal ICA stenosis.  patient started on DAPT: aspirin and Plavix.  Cardiology consulted high intensity statin initiated. TTE with bubble showed no evidence of intracardiac  shunt.  Speech language pathology consulted and recommended soft foods with small  bites for patient diet.  PT/OT consulted and recommended intensive inpatient rehabilitation. MRI brain demonstrated a small acute infarction of left posterior limb internal capsule.Additionally, cerebral volume loss with scattered remote infarcts found on study.  Patient discharged in hemodynamically stable condition to  inpatient rehabilitation facility.  Patient provided with return precautions    Physical Exam  Constitutional:       General: He is not in acute distress.     Appearance: Normal appearance.   HENT:      Head: Normocephalic and atraumatic.      Mouth/Throat:      Comments: Poor dentition  Cardiovascular:      Rate and Rhythm: Normal rate.   Pulmonary:      Effort: Pulmonary effort is normal. No respiratory distress.   Neurological:      Mental Status: He is alert.      Comments: Makes eye contact, smiles, answers questions appropriately. Speech mildly slurred, but understandable   Psychiatric:         Mood and Affect: Mood normal.         Behavior: Behavior normal.      Goals of Care Treatment Preferences:  Code Status: Full Code      SDOH Screening:  The patient was screened for utility  difficulties, food insecurity, transport difficulties, housing insecurity, and interpersonal safety and there were no concerns identified this admission.     Consults:   Consults (From admission, onward)          Status Ordering Provider     Inpatient consult to Midline team  Once        Provider:  (Not yet assigned)    Completed KALYN CUMMINGS     Inpatient consult to [facility]  Once        Provider:  (Not yet assigned)    Completed ANGELINA ELLISON     [facility]  Once        Provider:  (Not yet assigned)    Completed ANGELINA ELLISON     Inpatient consult to Registered Dietitian/Nutritionist  Once        Provider:  (Not yet assigned)    Completed MICKEY CARMEN     IP consult to case management/social work  Once        Provider:  (Not yet assigned)    Completed MICKEY CARMEN            * Slurred speech  Patient with history of stroke with left sided deficits now with new right-sided weakness and slurred speech.  CT Head:   No convincing acute intracranial abnormalities or evidence of large territory acute infarct.   Suspected remote lacunar infarct in the right thalamus.  Correlation needed.   There is patchy deep white matter low density as seen with microvascular chronic ischemic changes .  This limits detection for nonhemorrhagic acute white matter lacunar type infarcts.  Additional imaging can further evaluate these findings as clinically warranted.   Pan sinus disease with air-fluid levels in the maxillary and sphenoid sinuses.     Plan:  - Neuro checks q4hr.   - [facility] consulted; appreciate recs  - PT/OT/SLP consulted, appreciate recs  - F/U Cardiology recs 2/2 stenosis    Bilateral carotid artery stenosis  - Will follow up cardiology recommendations      Abdominal pain  Patient reporting abdominal pain. Mild TTP over umbilicus. Patient VSS. Denies nausea and vomiting.    Plan:  - Continue to monitor        Hypertension  Patient's blood pressure range in  the last 24 hours was: BP  Min: 120/49  Max: 149/61.The patient's inpatient anti-hypertensive regimen is listed below:  Current Antihypertensives  labetaloL injection 10 mg, Every 15 min PRN, Intravenous  amLODIPine tablet 5 mg, Daily, Oral    Plan  - Will monitor, currently on home amlodipine    Stage 3b chronic kidney disease  Creatine stable for now. BMP reviewed- noted Estimated Creatinine Clearance: 40.7 mL/min (based on SCr of 1.4 mg/dL). according to latest data. Based on current GFR, CKD stage is stage 3 - GFR 30-59.  Monitor UOP and serial BMP and adjust therapy as needed. Renally dose meds. Avoid nephrotoxic medications and procedures.    Plan:  - Continue to monitor    Osteoarthritis of both knees  Patient with history of OA of both knees complaining of pain in the right knee.   XR Right knee: chronic findings stable from prior    Plan:  - Will manage pain as appropriate      Final Active Diagnoses:    Diagnosis Date Noted POA    PRINCIPAL PROBLEM:  Slurred speech [R47.81] 12/31/2024 Yes    Altered mental status [R41.82] 01/04/2025 Yes    Acute focal neurological deficit [R29.818] 01/04/2025 Yes    Cerebrovascular accident (CVA) due to stenosis of small artery [I63.81] 01/04/2025 Unknown    Bilateral carotid artery stenosis [I65.23] 01/02/2025 Yes    Abdominal pain [R10.9] 01/01/2025 Yes    Osteoarthritis of both knees [M17.0] 02/17/2022 Yes    Hypertension [I10] 02/17/2022 Yes    Stage 3b chronic kidney disease [N18.32] 02/17/2022 Yes      Problems Resolved During this Admission:    Diagnosis Date Noted Date Resolved POA    Acute encephalopathy [G93.40] 12/31/2024 12/31/2024 Yes       Discharged Condition: stable    Disposition: Rehab Facility    Follow Up:    Patient Instructions:      Diet Adult Regular     Diet Dysphagia Soft   Order Comments: Soft bite sized (IDDSI Level 6) Thin; Standard Tray     Notify your health care provider if you experience any of the following:  increased confusion or  weakness     Notify your health care provider if you experience any of the following:  persistent dizziness, light-headedness, or visual disturbances     Notify your health care provider if you experience any of the following:  severe persistent headache     Activity as tolerated       Significant Diagnostic Studies: Labs: CMP   Recent Labs   Lab 01/03/25  0210 01/04/25  0326    138   K 4.0 4.1    106   CO2 21* 21*   GLU 79 74   BUN 20 31*   CREATININE 1.2 1.4   CALCIUM 8.9 8.9   PROT 7.0 6.9   ALBUMIN 3.4* 3.3*   BILITOT 0.7 0.5   ALKPHOS 70 74   AST 14 15   ALT 13 13   ANIONGAP 11 11   , CBC   Recent Labs   Lab 01/03/25  0210 01/04/25  0326   WBC 5.92 6.59   HGB 10.4* 10.2*   HCT 31.4* 30.9*    148*   , Lipid Panel   Lab Results   Component Value Date    CHOL 142 12/31/2024    HDL 45 12/31/2024    LDLCALC 84.2 12/31/2024    TRIG 64 12/31/2024    CHOLHDL 31.7 12/31/2024   , Troponin   Recent Labs   Lab 12/31/24  1930   TROPONINI <0.006   , and A1C:   Recent Labs   Lab 01/01/25  1319   HGBA1C 5.5       Pending Diagnostic Studies:       Procedure Component Value Units Date/Time    Vitamin B1 [[id number]] Collected: 01/01/25 1319    Order Status: Sent Lab Status: In process Updated: 01/01/25 1537    Specimen: Blood     Vitamin B6 [[id number]] Collected: 01/01/25 1319    Order Status: Sent Lab Status: In process Updated: 01/01/25 1537    Specimen: Blood            Medications:  Reconciled Home Medications:      Medication List        START taking these medications      aspirin 81 MG Chew  Take 1 tablet (81 mg total) by mouth once daily.  Start taking on: January 5, 2025     clopidogreL 75 mg tablet  Commonly known as: PLAVIX  Take 1 tablet (75 mg total) by mouth once daily. for 18 days  Start taking on: January 5, 2025     cyanocobalamin 1000 MCG tablet  Commonly known as: VITAMIN B-12  Take 1 tablet (1,000 mcg total) by mouth once daily.  Start taking on: January 5, 2025            CHANGE how you  take these medications      atorvastatin 80 MG tablet  Commonly known as: LIPITOR  Take 1 tablet (80 mg total) by mouth once daily.  Start taking on: January 5, 2025  What changed:   medication strength  how much to take            CONTINUE taking these medications      amLODIPine 5 MG tablet  Commonly known as: NORVASC  Take 1 tablet (5 mg total) by mouth once daily.     carvediloL 25 MG tablet  Commonly known as: COREG  Take 1 tablet (25 mg total) by mouth 2 (two) times daily.     ferrous gluconate 324 MG tablet  Commonly known as: FERGON  Take 1 tablet (324 mg total) by mouth every morning.     magnesium oxide 400 mg (241.3 mg magnesium) tablet  Commonly known as: MAG-OX  Take 1 tablet (400 mg total) by mouth once daily.     nitroGLYCERIN 0.4 MG SL tablet  Commonly known as: NITROSTAT  Place 1 tablet (0.4 mg total) under the tongue every 5 (five) minutes as needed for Chest pain.            STOP taking these medications      fexofenadine 180 MG tablet  Commonly known as: ALLEGRA     potassium chloride 8 MEQ Tbsr  Commonly known as: KLOR-CON     ranolazine 1,000 mg Tb12  Commonly known as: RANEXA     sodium bicarbonate 650 MG tablet              Indwelling Lines/Drains at time of discharge:   Lines/Drains/Airways       Drain  Duration             Male External Urinary Catheter 01/01/25 0912 3 days                    Time spent on the discharge of patient: 45 minutes         Blade Parra MD  [facility]

## 2025-01-06 NOTE — PLAN OF CARE
Medicare Message     Important Message from Medicare regarding Discharge Appeal Rights Other (comments)Important Message from Medicare regarding Discharge Appeal Rights. Other (comments). The comment is Patient unable to sign due to medical condition. Taken on 1/6/25 1623   Date IMM was signed 1/3/2025   Time IMM was signed 1023

## 2025-01-07 LAB — VIT B1 BLD-MCNC: 35 UG/L (ref 38–122)

## 2025-01-08 LAB — PYRIDOXAL SERPL-MCNC: 7 UG/L (ref 5–50)

## 2025-01-27 ENCOUNTER — OFFICE VISIT (OUTPATIENT)
Dept: FAMILY MEDICINE | Facility: CLINIC | Age: 85
End: 2025-01-27
Payer: MEDICARE

## 2025-01-27 ENCOUNTER — LAB VISIT (OUTPATIENT)
Dept: LAB | Facility: HOSPITAL | Age: 85
End: 2025-01-27
Attending: FAMILY MEDICINE
Payer: MEDICARE

## 2025-01-27 VITALS
HEIGHT: 70 IN | TEMPERATURE: 98 F | WEIGHT: 158.31 LBS | HEART RATE: 73 BPM | SYSTOLIC BLOOD PRESSURE: 118 MMHG | OXYGEN SATURATION: 100 % | DIASTOLIC BLOOD PRESSURE: 58 MMHG | BODY MASS INDEX: 22.66 KG/M2

## 2025-01-27 DIAGNOSIS — R05.9 COUGH, UNSPECIFIED TYPE: ICD-10-CM

## 2025-01-27 DIAGNOSIS — I69.354 HEMIPLEGIA AND HEMIPARESIS FOLLOWING CEREBRAL INFARCTION AFFECTING LEFT NON-DOMINANT SIDE: ICD-10-CM

## 2025-01-27 DIAGNOSIS — I65.29 STENOSIS OF CAROTID ARTERY, UNSPECIFIED LATERALITY: ICD-10-CM

## 2025-01-27 DIAGNOSIS — I10 HYPERTENSION, UNSPECIFIED TYPE: ICD-10-CM

## 2025-01-27 DIAGNOSIS — D64.9 ANEMIA, UNSPECIFIED TYPE: ICD-10-CM

## 2025-01-27 DIAGNOSIS — I63.81 CEREBROVASCULAR ACCIDENT (CVA) DUE TO STENOSIS OF SMALL ARTERY: ICD-10-CM

## 2025-01-27 DIAGNOSIS — M17.0 PRIMARY OSTEOARTHRITIS OF BOTH KNEES: ICD-10-CM

## 2025-01-27 DIAGNOSIS — Z09 HOSPITAL DISCHARGE FOLLOW-UP: Primary | ICD-10-CM

## 2025-01-27 DIAGNOSIS — M06.9 RHEUMATOID ARTHRITIS, INVOLVING UNSPECIFIED SITE, UNSPECIFIED WHETHER RHEUMATOID FACTOR PRESENT: ICD-10-CM

## 2025-01-27 DIAGNOSIS — N18.32 STAGE 3B CHRONIC KIDNEY DISEASE: ICD-10-CM

## 2025-01-27 LAB
BASOPHILS # BLD AUTO: 0.03 K/UL (ref 0–0.2)
BASOPHILS NFR BLD: 0.6 % (ref 0–1.9)
DIFFERENTIAL METHOD BLD: ABNORMAL
EOSINOPHIL # BLD AUTO: 0.2 K/UL (ref 0–0.5)
EOSINOPHIL NFR BLD: 4.2 % (ref 0–8)
ERYTHROCYTE [DISTWIDTH] IN BLOOD BY AUTOMATED COUNT: 16.5 % (ref 11.5–14.5)
HCT VFR BLD AUTO: 30 % (ref 40–54)
HGB BLD-MCNC: 9.5 G/DL (ref 14–18)
IMM GRANULOCYTES # BLD AUTO: 0.01 K/UL (ref 0–0.04)
IMM GRANULOCYTES NFR BLD AUTO: 0.2 % (ref 0–0.5)
IRON SERPL-MCNC: 99 UG/DL (ref 45–160)
LYMPHOCYTES # BLD AUTO: 1.5 K/UL (ref 1–4.8)
LYMPHOCYTES NFR BLD: 27.7 % (ref 18–48)
MCH RBC QN AUTO: 28.1 PG (ref 27–31)
MCHC RBC AUTO-ENTMCNC: 31.7 G/DL (ref 32–36)
MCV RBC AUTO: 89 FL (ref 82–98)
MONOCYTES # BLD AUTO: 0.5 K/UL (ref 0.3–1)
MONOCYTES NFR BLD: 9.8 % (ref 4–15)
NEUTROPHILS # BLD AUTO: 3.1 K/UL (ref 1.8–7.7)
NEUTROPHILS NFR BLD: 57.5 % (ref 38–73)
NRBC BLD-RTO: 0 /100 WBC
PLATELET # BLD AUTO: 182 K/UL (ref 150–450)
PMV BLD AUTO: 12.4 FL (ref 9.2–12.9)
RBC # BLD AUTO: 3.38 M/UL (ref 4.6–6.2)
SATURATED IRON: 30 % (ref 20–50)
TOTAL IRON BINDING CAPACITY: 326 UG/DL (ref 250–450)
TRANSFERRIN SERPL-MCNC: 220 MG/DL (ref 200–375)
WBC # BLD AUTO: 5.3 K/UL (ref 3.9–12.7)

## 2025-01-27 PROCEDURE — 99999 PR PBB SHADOW E&M-EST. PATIENT-LVL V: CPT | Mod: PBBFAC,,, | Performed by: FAMILY MEDICINE

## 2025-01-27 PROCEDURE — 84466 ASSAY OF TRANSFERRIN: CPT | Performed by: FAMILY MEDICINE

## 2025-01-27 PROCEDURE — 85025 COMPLETE CBC W/AUTO DIFF WBC: CPT | Performed by: FAMILY MEDICINE

## 2025-01-27 PROCEDURE — 36415 COLL VENOUS BLD VENIPUNCTURE: CPT | Performed by: FAMILY MEDICINE

## 2025-01-27 PROCEDURE — 99215 OFFICE O/P EST HI 40 MIN: CPT | Mod: PBBFAC,PO | Performed by: FAMILY MEDICINE

## 2025-01-27 PROCEDURE — 99215 OFFICE O/P EST HI 40 MIN: CPT | Mod: S$PBB,,, | Performed by: FAMILY MEDICINE

## 2025-01-27 RX ORDER — PANTOPRAZOLE SODIUM 40 MG/1
40 TABLET, DELAYED RELEASE ORAL
COMMUNITY

## 2025-01-27 RX ORDER — GUAIFENESIN 100 MG/5ML
200 SOLUTION ORAL 3 TIMES DAILY PRN
Qty: 118 ML | Refills: 0 | Status: SHIPPED | OUTPATIENT
Start: 2025-01-27 | End: 2025-02-06

## 2025-01-27 NOTE — PROGRESS NOTES
(Portions of this note were dictated using voice recognition software and may contain dictation related errors in spelling/grammar/syntax not found on text review)    CC:   Chief Complaint   Patient presents with    Hospital Follow Up    Arm Pain    Cough    Nasal Congestion     X 10 days       HPI: 84 y.o. male presented for hospital discharge follow up visit, he is accompanied with his daughter.  He has medical history significant for stroke with residual left-sided weakness, diastolic heart failure, essential hypertension, coronary artery disease, osteoarthritis of both knees and vascular dementia.  He was recently admitted to [facility] December 31, 2024 concern of right-sided weakness and slurred speech.  Per daughter the patient was last known normal 3 days prior to for finding him with slurred speech and worsening left-sided weakness.    He was taken to the ED and found to be hypertensive, troponin was negative, flu and COVID was negative, CT scan showed no convincing acute intracranial abnormalities was admitted for further stroke workup.  MRI of the brain showed small acute infarct of the left posterior limb internal capsule with superimposed cerebral volume loss with scattered remote infarcts throughout.  Neurology was who recommended 21 days of dual antiplatelet therapy with recommendation to followed by aspirin indefinitely.  Cardiology was consulted due to concern for CTA stenosis of 50-60 % of left internal carotid artery stenosis.    He was enrolled in comprehensive rehabilitation program in/OT and speech therapy.  He was discharged to [facility] facility and stayed there for 2 weeks.     Pt reports having pain in both knees, has long term history of OA of knee, used to follow up with pain management in the past.    No other symptoms or concerns.    Past Medical History:   Diagnosis Date    Arthritis     Hypertension     Renal disorder     Stroke        History reviewed. No pertinent  surgical history.    Family History   Problem Relation Name Age of Onset    Hypertension Mother      COPD Father      No Known Problems Sister Zunilda     No Known Problems Brother Rowdy Hough     Hypertension Brother Jaxon     Diabetes Brother Jaxon     Hypertension Daughter x3     Hyperlipidemia Daughter x3     Diabetes Daughter x3     Lupus Daughter x3     Lymphoma Daughter x1     Hypertension Son x2     Diabetes Son x2        Social History     Tobacco Use    Smoking status: Former     Current packs/day: 0.00     Types: Cigarettes     Quit date: 1994     Years since quittin.6     Passive exposure: Never    Smokeless tobacco: Never   Substance Use Topics    Alcohol use: Not Currently    Drug use: Not Currently       Lab Results   Component Value Date    WBC 5.30 2025    HGB 9.5 (L) 2025    HCT 30.0 (L) 2025    MCV 89 2025     2025    CHOL 142 2024    TRIG 64 2024    HDL 45 2024    ALT 13 2025    AST 15 2025    BILITOT 0.5 2025    ALKPHOS 74 2025     2025    K 4.1 2025     2025    CREATININE 1.4 2025    ESTGFRAFRICA 50.9 (A) 2022    EGFRNONAA 44.0 (A) 2022    CALCIUM 8.9 2025    ALBUMIN 3.3 (L) 2025    BUN 31 (H) 2025    CO2 21 (L) 2025    TSH 3.304 2024    INR 1.1 2025    HGBA1C 5.5 2025    LDLCALC 84.2 2024    GLU 74 2025             Vital signs reviewed  PE:   APPEARANCE: In no acute distress.    HEAD: Normocephalic, atraumatic.  EYES: EOMI.  Conjunctivae noninjected.  NOSE: Mucosa pink. Airway clear.  NECK: Supple with no cervical lymphadenopathy.    CHEST: Good inspiratory effort. Lungs clear to auscultation with no wheezes or crackles.  CARDIOVASCULAR: Normal S1, S2. No rubs, murmurs, or gallops.  ABDOMEN: Bowel sounds normal. Not distended. Soft. No tenderness or masses. No organomegaly.  EXTREMITIES: No edema,  cyanosis, or clubbing.  Neurological:   Review of Systems   Constitutional:  Negative for chills, fatigue and fever.   HENT: Negative.     Respiratory:  Negative for cough, shortness of breath and wheezing.    Cardiovascular:  Negative for chest pain, palpitations and leg swelling.   Gastrointestinal: Negative.    Genitourinary: Negative.    Musculoskeletal:  Positive for arthralgias.   Neurological:  Positive for weakness. Negative for dizziness, vertigo, syncope, speech difficulty, light-headedness, numbness, headaches, memory loss and coordination difficulties.   Psychiatric/Behavioral: Negative.     All other systems reviewed and are negative.      IMPRESSION  1. Hospital discharge follow-up    2. Rheumatoid arthritis, involving unspecified site, unspecified whether rheumatoid factor present    3. Hemiplegia and hemiparesis following cerebral infarction affecting left non-dominant side    4. Stage 3b chronic kidney disease    5. Primary osteoarthritis of both knees    6. Stenosis of carotid artery, unspecified laterality    7. Cerebrovascular accident (CVA) due to stenosis of small artery    8. Anemia, unspecified type    9. Cough, unspecified type    10. Hypertension, unspecified type            PLAN      1. Rheumatoid arthritis, involving unspecified site, unspecified whether rheumatoid factor present    Follow up with rheumatology in March      2. Hemiplegia and hemiparesis following cerebral infarction affecting left non-dominant side    Continue PT/OT      3. Stage 3b chronic kidney disease    Stable    Avoid nephrotoxic agents      4. Hospital discharge follow-up (Primary)        5. Primary osteoarthritis of both knees    - Ambulatory referral/consult to Orthopedics; Future      6. Stenosis of carotid artery, unspecified laterality    - Ambulatory referral/consult to Vascular Surgery; Future      7. Cerebrovascular accident (CVA) due to stenosis of small artery    - Ambulatory referral/consult to Vascular  Neurology; Future    Continue current medications      8. Anemia, unspecified type    - CBC Auto Differential; Future  - Iron and TIBC; Future       9. Cough, unspecified type    - guaiFENesin 100 mg/5 ml (ROBITUSSIN) 100 mg/5 mL syrup; Take 10 mLs (200 mg total) by mouth 3 (three) times daily as needed for Cough.  Dispense: 118 mL; Refill: 0      10. Hypertension, unspecified type    Advised to monitor bp at home    Continue medications with holding parameter        SCREENINGS      Age/demographic appropriate health maintenance:    Health Maintenance Due   Topic Date Due    Shingles Vaccine (1 of 2) Never done    RSV Vaccine (Age 60+ and Pregnant patients) (1 - 1-dose 75+ series) Never done    COVID-19 Vaccine (2 - 2024-25 season) 09/01/2024           Spent adequate time in obtaining history and explaining differentials     40 minutes spent during this visit of which greater than 50% devoted to face-face counseling and coordination of care regarding diagnosis and management plan       Phuong Umaña   1/27/2025

## 2025-01-30 RX ORDER — POTASSIUM CHLORIDE 750 MG/1
10 TABLET, EXTENDED RELEASE ORAL DAILY
Qty: 30 TABLET | Refills: 0 | Status: SHIPPED | OUTPATIENT
Start: 2025-01-30 | End: 2025-03-02

## 2025-01-31 ENCOUNTER — TELEPHONE (OUTPATIENT)
Dept: ORTHOPEDICS | Facility: CLINIC | Age: 85
End: 2025-01-31
Payer: MEDICARE

## 2025-01-31 DIAGNOSIS — M25.562 PAIN IN BOTH KNEES, UNSPECIFIED CHRONICITY: Primary | ICD-10-CM

## 2025-01-31 DIAGNOSIS — M25.561 PAIN IN BOTH KNEES, UNSPECIFIED CHRONICITY: Primary | ICD-10-CM

## 2025-01-31 NOTE — TELEPHONE ENCOUNTER
Called and spoke with pt daughter Ashley. Confirmed appt with Dr. Mcintyre for Wednesday and informed her we will need xray prior to appt. She verbalized understanding.

## 2025-02-03 ENCOUNTER — INITIAL CONSULT (OUTPATIENT)
Dept: VASCULAR SURGERY | Facility: CLINIC | Age: 85
End: 2025-02-03
Payer: MEDICARE

## 2025-02-03 VITALS
BODY MASS INDEX: 23.18 KG/M2 | HEIGHT: 69 IN | DIASTOLIC BLOOD PRESSURE: 54 MMHG | WEIGHT: 156.5 LBS | HEART RATE: 48 BPM | SYSTOLIC BLOOD PRESSURE: 122 MMHG | TEMPERATURE: 98 F

## 2025-02-03 DIAGNOSIS — I65.29 STENOSIS OF CAROTID ARTERY, UNSPECIFIED LATERALITY: ICD-10-CM

## 2025-02-03 PROCEDURE — 99203 OFFICE O/P NEW LOW 30 MIN: CPT | Mod: S$PBB,,, | Performed by: SURGERY

## 2025-02-03 PROCEDURE — 99213 OFFICE O/P EST LOW 20 MIN: CPT | Mod: PBBFAC | Performed by: SURGERY

## 2025-02-03 PROCEDURE — 99999 PR PBB SHADOW E&M-EST. PATIENT-LVL III: CPT | Mod: PBBFAC,,, | Performed by: SURGERY

## 2025-02-03 NOTE — PROGRESS NOTES
In for carotid disease.  Few years ago he had a stroke right cerebral which affected his left arm.  A proximally 2-3 weeks ago according to his daughter who accompanies him had a stroke which affected his right arm left cerebral.  He has a host of medical problems including a hypertension hyperlipidemia and the strokes.  He also has heart disease.    I questioned him at length and he is not oriented x3 did not know place did not know the president did not know the year.  I had some time and I discussed with his daughter who is his caregiver about a possible surgery.  She did not think he would do well with surgery being disoriented.    I have reviewed his CTA and he does have a 50-60% stenosis on both sides.  He also has significant calcium disease in the arch of the aorta.    I believe that he would not benefit from surgery long term.  I believe that the risks in this gentleman especially of decompensation and mental status is great.  The daughter agreed with that.  I think after discussing with the daughter that the best course of action is to non operative care for this patient.  He is on clopidogrel and he is on an aspirin I would keep him on both at this point.

## 2025-02-05 ENCOUNTER — HOSPITAL ENCOUNTER (OUTPATIENT)
Dept: RADIOLOGY | Facility: HOSPITAL | Age: 85
Discharge: HOME | End: 2025-02-05
Attending: ORTHOPAEDIC SURGERY
Payer: MEDICARE

## 2025-02-05 ENCOUNTER — OFFICE VISIT (OUTPATIENT)
Dept: ORTHOPEDICS | Facility: CLINIC | Age: 85
End: 2025-02-05
Payer: MEDICARE

## 2025-02-05 VITALS — BODY MASS INDEX: 23.18 KG/M2 | HEIGHT: 69 IN | WEIGHT: 156.5 LBS

## 2025-02-05 DIAGNOSIS — M25.562 PAIN IN BOTH KNEES, UNSPECIFIED CHRONICITY: ICD-10-CM

## 2025-02-05 DIAGNOSIS — M25.561 PAIN IN BOTH KNEES, UNSPECIFIED CHRONICITY: ICD-10-CM

## 2025-02-05 DIAGNOSIS — M17.0 PRIMARY OSTEOARTHRITIS OF BOTH KNEES: ICD-10-CM

## 2025-02-05 PROBLEM — I25.119 ATHEROSCLEROSIS OF NATIVE CORONARY ARTERY OF NATIVE HEART WITH ANGINA PECTORIS: Chronic | Status: ACTIVE | Noted: 2024-06-04

## 2025-02-05 PROCEDURE — 99213 OFFICE O/P EST LOW 20 MIN: CPT | Mod: PBBFAC,25,PN | Performed by: ORTHOPAEDIC SURGERY

## 2025-02-05 PROCEDURE — 99204 OFFICE O/P NEW MOD 45 MIN: CPT | Mod: 25,S$PBB,, | Performed by: ORTHOPAEDIC SURGERY

## 2025-02-05 PROCEDURE — 99999 PR PBB SHADOW E&M-EST. PATIENT-LVL III: CPT | Mod: PBBFAC,,, | Performed by: ORTHOPAEDIC SURGERY

## 2025-02-05 PROCEDURE — 73562 X-RAY EXAM OF KNEE 3: CPT | Mod: TC,50,FY

## 2025-02-05 PROCEDURE — 73562 X-RAY EXAM OF KNEE 3: CPT | Mod: 26,50,, | Performed by: RADIOLOGY

## 2025-02-05 NOTE — PROGRESS NOTES
[facility] Progress Note     Chief complaint: bilateral severe tricompartmental knee osteoarthritis      HPI:  85yo M who presents today accompanied by his daughter for evaluation of chronic bilateral knee pain. She states he was previously being seen by pain management and his knee pain was treated with oral pain medicines. The patient and his daughter are both unaware if he previously has tried intraarticular corticosteroid injections. Patient characterizes his pain as aching, severe and bilaterally deep in his knees. He denies a history of recent falls or injuries. He denies paresthesias in the lower extremities. Denies hip pain bilaterally.       Of note, he recently sustained a CVA with residual left sided hemiparesis in December and is taking aspirin and plavix. He has a past history of strokes previous to this episode. He was advised against taking NSAIDs for his pain. Also, he was evaluated by vascular surgery and diagnosed with carotid artery stenosis but due to his overall health he is not a candidate for endarterectomy.   He is interested in injections.     Lives with his daughter  Prior to recent stroke, ambulatory independently in the house with walker, now requires wheelchair for mobility outside of the home    Past Medical History:   Diagnosis Date    Arthritis     Hypertension     Renal disorder     Stroke      History reviewed. No pertinent surgical history.  Review of patient's allergies indicates:   Allergen Reactions    Nsaids (non-steroidal anti-inflammatory drug)          O:     PE:  Gen: A+Ox3, NAD  Card: RRR by RP    BLE  Skin intact no open wounds bilateral knees  Knee ROM on L 5-100 with crepitus, discomfort on full flexion  Knee ROM on R 15-90 with mechanical block at terminal flexion  Stable to varus and valgus stress  Sensation grossly intact throughout the entire leg, knee, foot  Motor intact quad/hs/ta/gsc/ehl      A/P:  85yo M PMH CVA, CAD, who presents with  bilateral end stage tricompartmental knee OA.    -discussed that due to his concomitant medical problems he is not currently a candidate for TKA, although this would be the definitive treatment for his knee OA and pain, patient and his daughter understand this, would like to move forward with a knee injection to manage his symptoms today      PROCEDURE NOTE    PRE-PROCEDURE DIAGNOSIS:  Bilateral knee osteoarthritis    INDICATION:  Improve pain    POST-PROCEDURE DIAGNOSIS:  bilateral knee osteoarthritis    PROCEDURE:  bilateral intra-articular corticosteroid injection    The correct extremity was identified. The risks, benefits, and alternatives were discussed. The patient gave consent to proceed with the injection.  The knee was prepped in the usual sterile manner. Using an superolateral approach, a mixture containing 4cc of 1% lidocaine, 5 cc marcaine, and 1 cc of Kenalog (40mg/cc) was injected into the knee joint. The patient tolerated the procedure without any known complication. Injection sites were dressed with a bandaid          Morales Jang, PGY5  [employer] [facility]

## 2025-02-27 RX ORDER — RANOLAZINE 1000 MG/1
1000 TABLET, EXTENDED RELEASE ORAL 2 TIMES DAILY
Qty: 60 TABLET | Refills: 0 | OUTPATIENT
Start: 2025-02-27 | End: 2025-03-29

## 2025-03-07 ENCOUNTER — DOCUMENT SCAN (OUTPATIENT)
Dept: HOME HEALTH SERVICES | Facility: HOSPITAL | Age: 85
End: 2025-03-07
Payer: MEDICARE

## 2025-03-07 ENCOUNTER — EXTERNAL HOME HEALTH (OUTPATIENT)
Dept: HOME HEALTH SERVICES | Facility: HOSPITAL | Age: 85
End: 2025-03-07
Payer: MEDICARE

## 2025-03-17 ENCOUNTER — DOCUMENT SCAN (OUTPATIENT)
Dept: HOME HEALTH SERVICES | Facility: HOSPITAL | Age: 85
End: 2025-03-17
Payer: MEDICARE

## 2025-03-17 DIAGNOSIS — E78.5 HYPERLIPIDEMIA, UNSPECIFIED HYPERLIPIDEMIA TYPE: Primary | ICD-10-CM

## 2025-03-17 RX ORDER — SODIUM BICARBONATE 650 MG/1
650 TABLET ORAL 2 TIMES DAILY
Qty: 180 TABLET | Refills: 3 | OUTPATIENT
Start: 2025-03-17

## 2025-03-17 RX ORDER — ATORVASTATIN CALCIUM 40 MG/1
40 TABLET, FILM COATED ORAL DAILY
Qty: 90 TABLET | Refills: 3 | OUTPATIENT
Start: 2025-03-17

## 2025-03-17 RX ORDER — POTASSIUM CHLORIDE 750 MG/1
10 TABLET, EXTENDED RELEASE ORAL DAILY
Qty: 30 TABLET | Refills: 0 | Status: SHIPPED | OUTPATIENT
Start: 2025-03-17 | End: 2025-04-17

## 2025-03-17 RX ORDER — ATORVASTATIN CALCIUM 80 MG/1
80 TABLET, FILM COATED ORAL DAILY
Qty: 90 TABLET | Refills: 3 | Status: SHIPPED | OUTPATIENT
Start: 2025-03-17 | End: 2026-03-17

## 2025-03-20 ENCOUNTER — DOCUMENT SCAN (OUTPATIENT)
Dept: HOME HEALTH SERVICES | Facility: HOSPITAL | Age: 85
End: 2025-03-20
Payer: MEDICARE

## 2025-06-23 DIAGNOSIS — Z00.00 ENCOUNTER FOR MEDICARE ANNUAL WELLNESS EXAM: ICD-10-CM

## 2025-06-24 RX ORDER — POTASSIUM CHLORIDE 750 MG/1
10 TABLET, EXTENDED RELEASE ORAL DAILY
Qty: 30 TABLET | Refills: 0 | Status: SHIPPED | OUTPATIENT
Start: 2025-06-24 | End: 2025-07-25

## 2025-06-27 ENCOUNTER — TELEPHONE (OUTPATIENT)
Dept: ADMINISTRATIVE | Facility: HOSPITAL | Age: 85
End: 2025-06-27
Payer: MEDICARE

## 2025-06-27 VITALS — SYSTOLIC BLOOD PRESSURE: 132 MMHG | DIASTOLIC BLOOD PRESSURE: 62 MMHG

## 2025-08-05 ENCOUNTER — PATIENT OUTREACH (OUTPATIENT)
Dept: ADMINISTRATIVE | Facility: HOSPITAL | Age: 85
End: 2025-08-05
Payer: MEDICARE

## 2025-08-05 NOTE — PROGRESS NOTES
08/05/2025  VB chart audit performed. Care Everywhere updates requested and reviewed  Overdue HM topic chart audit and/or requested. LINKS triggered and reconciled. Media reviewed